# Patient Record
Sex: FEMALE | Race: WHITE | NOT HISPANIC OR LATINO | URBAN - METROPOLITAN AREA
[De-identification: names, ages, dates, MRNs, and addresses within clinical notes are randomized per-mention and may not be internally consistent; named-entity substitution may affect disease eponyms.]

---

## 2017-10-04 ENCOUNTER — INPATIENT HOSPITAL (OUTPATIENT)
Dept: URBAN - METROPOLITAN AREA HOSPITAL 76 | Facility: HOSPITAL | Age: 62
End: 2017-10-04
Payer: MEDICAID

## 2017-10-04 DIAGNOSIS — R11.2 NAUSEA WITH VOMITING, UNSPECIFIED: ICD-10-CM

## 2017-10-04 DIAGNOSIS — R10.32 LEFT LOWER QUADRANT PAIN: ICD-10-CM

## 2017-10-04 DIAGNOSIS — R93.3 ABNORMAL FINDINGS ON DIAGNOSTIC IMAGING OF OTHER PARTS OF DI: ICD-10-CM

## 2017-10-04 DIAGNOSIS — K70.30 ALCOHOLIC CIRRHOSIS OF LIVER WITHOUT ASCITES: ICD-10-CM

## 2017-10-04 DIAGNOSIS — J44.9 CHRONIC OBSTRUCTIVE PULMONARY DISEASE, UNSPECIFIED: ICD-10-CM

## 2017-10-04 DIAGNOSIS — R10.31 RIGHT LOWER QUADRANT PAIN: ICD-10-CM

## 2017-10-04 PROCEDURE — 99223 1ST HOSP IP/OBS HIGH 75: CPT | Performed by: NURSE PRACTITIONER

## 2017-10-05 ENCOUNTER — INPATIENT HOSPITAL (OUTPATIENT)
Dept: URBAN - METROPOLITAN AREA HOSPITAL 76 | Facility: HOSPITAL | Age: 62
End: 2017-10-05
Payer: MEDICAID

## 2017-10-05 DIAGNOSIS — R93.3 ABNORMAL FINDINGS ON DIAGNOSTIC IMAGING OF OTHER PARTS OF DI: ICD-10-CM

## 2017-10-05 DIAGNOSIS — K70.30 ALCOHOLIC CIRRHOSIS OF LIVER WITHOUT ASCITES: ICD-10-CM

## 2017-10-05 DIAGNOSIS — A41.9 SEPSIS, UNSPECIFIED ORGANISM: ICD-10-CM

## 2017-10-05 DIAGNOSIS — D72.825 BANDEMIA: ICD-10-CM

## 2017-10-05 DIAGNOSIS — R10.9 UNSPECIFIED ABDOMINAL PAIN: ICD-10-CM

## 2017-10-05 DIAGNOSIS — J44.9 CHRONIC OBSTRUCTIVE PULMONARY DISEASE, UNSPECIFIED: ICD-10-CM

## 2017-10-05 PROCEDURE — 99232 SBSQ HOSP IP/OBS MODERATE 35: CPT | Performed by: NURSE PRACTITIONER

## 2017-10-06 ENCOUNTER — INPATIENT HOSPITAL (OUTPATIENT)
Dept: URBAN - METROPOLITAN AREA HOSPITAL 76 | Facility: HOSPITAL | Age: 62
End: 2017-10-06
Payer: MEDICAID

## 2017-10-06 DIAGNOSIS — D72.825 BANDEMIA: ICD-10-CM

## 2017-10-06 DIAGNOSIS — J44.9 CHRONIC OBSTRUCTIVE PULMONARY DISEASE, UNSPECIFIED: ICD-10-CM

## 2017-10-06 DIAGNOSIS — A41.9 SEPSIS, UNSPECIFIED ORGANISM: ICD-10-CM

## 2017-10-06 DIAGNOSIS — R93.3 ABNORMAL FINDINGS ON DIAGNOSTIC IMAGING OF OTHER PARTS OF DI: ICD-10-CM

## 2017-10-06 DIAGNOSIS — R10.9 UNSPECIFIED ABDOMINAL PAIN: ICD-10-CM

## 2017-10-06 DIAGNOSIS — K70.30 ALCOHOLIC CIRRHOSIS OF LIVER WITHOUT ASCITES: ICD-10-CM

## 2017-10-06 PROCEDURE — 99232 SBSQ HOSP IP/OBS MODERATE 35: CPT | Performed by: NURSE PRACTITIONER

## 2017-10-30 ENCOUNTER — OFFICE (OUTPATIENT)
Dept: URBAN - METROPOLITAN AREA CLINIC 64 | Facility: CLINIC | Age: 62
End: 2017-10-30
Payer: MEDICAID

## 2017-10-30 VITALS
HEIGHT: 60 IN | SYSTOLIC BLOOD PRESSURE: 127 MMHG | DIASTOLIC BLOOD PRESSURE: 76 MMHG | HEART RATE: 97 BPM | WEIGHT: 95 LBS

## 2017-10-30 DIAGNOSIS — K70.30 ALCOHOLIC CIRRHOSIS OF LIVER WITHOUT ASCITES: ICD-10-CM

## 2017-10-30 DIAGNOSIS — K56.2 VOLVULUS: ICD-10-CM

## 2017-10-30 DIAGNOSIS — K56.601 COMPLETE INTESTINAL OBSTRUCTION, UNSPECIFIED AS TO CAUSE: ICD-10-CM

## 2017-10-30 PROCEDURE — 99213 OFFICE O/P EST LOW 20 MIN: CPT | Performed by: NURSE PRACTITIONER

## 2022-03-13 ENCOUNTER — APPOINTMENT (OUTPATIENT)
Dept: GENERAL RADIOLOGY | Facility: HOSPITAL | Age: 67
End: 2022-03-13

## 2022-03-13 ENCOUNTER — HOSPITAL ENCOUNTER (INPATIENT)
Facility: HOSPITAL | Age: 67
LOS: 5 days | Discharge: HOME | End: 2022-03-18
Attending: INTERNAL MEDICINE | Admitting: HOSPITALIST

## 2022-03-13 ENCOUNTER — APPOINTMENT (OUTPATIENT)
Dept: CARDIOLOGY | Facility: HOSPITAL | Age: 67
End: 2022-03-13

## 2022-03-13 PROBLEM — E87.20 LACTIC ACIDOSIS: Status: ACTIVE | Noted: 2022-03-13

## 2022-03-13 PROBLEM — R65.10 SIRS (SYSTEMIC INFLAMMATORY RESPONSE SYNDROME): Status: ACTIVE | Noted: 2022-03-13

## 2022-03-13 PROBLEM — I50.9 CHF EXACERBATION: Status: ACTIVE | Noted: 2022-03-13

## 2022-03-13 PROBLEM — D69.6 THROMBOCYTOPENIA: Status: ACTIVE | Noted: 2022-03-13

## 2022-03-13 PROBLEM — E87.1 HYPEROSMOLAR HYPONATREMIA: Status: RESOLVED | Noted: 2022-03-13 | Resolved: 2022-03-13

## 2022-03-13 PROBLEM — R74.8 ELEVATED LIVER ENZYMES: Status: ACTIVE | Noted: 2022-03-13

## 2022-03-13 PROBLEM — T14.8XXA SKIN EXCORIATION: Status: ACTIVE | Noted: 2022-03-13

## 2022-03-13 PROBLEM — E87.0 HYPEROSMOLAR HYPONATREMIA: Status: ACTIVE | Noted: 2022-03-13

## 2022-03-13 PROBLEM — E87.1 HYPEROSMOLAR HYPONATREMIA: Status: ACTIVE | Noted: 2022-03-13

## 2022-03-13 PROBLEM — E87.0 HYPEROSMOLAR HYPONATREMIA: Status: RESOLVED | Noted: 2022-03-13 | Resolved: 2022-03-13

## 2022-03-13 LAB
ALBUMIN SERPL-MCNC: 2.1 G/DL (ref 3.5–5.2)
ALBUMIN SERPL-MCNC: 2.1 G/DL (ref 3.5–5.2)
ALBUMIN SERPL-MCNC: 2.4 G/DL (ref 3.5–5.2)
ALBUMIN/GLOB SERPL: 0.8 G/DL
ALP SERPL-CCNC: 137 U/L (ref 39–117)
ALT SERPL W P-5'-P-CCNC: 64 U/L (ref 1–33)
ANION GAP SERPL CALCULATED.3IONS-SCNC: 3 MMOL/L (ref 5–15)
ANION GAP SERPL CALCULATED.3IONS-SCNC: 4 MMOL/L (ref 5–15)
ANION GAP SERPL CALCULATED.3IONS-SCNC: 5 MMOL/L (ref 5–15)
ARTERIAL PATENCY WRIST A: POSITIVE
AST SERPL-CCNC: 58 U/L (ref 1–32)
ATMOSPHERIC PRESS: ABNORMAL MM[HG]
BASE EXCESS BLDA CALC-SCNC: 9.8 MMOL/L (ref 0–3)
BASOPHILS # BLD AUTO: 0 10*3/MM3 (ref 0–0.2)
BASOPHILS NFR BLD AUTO: 0.3 % (ref 0–1.5)
BDY SITE: ABNORMAL
BH CV ECHO MEAS - AO MAX PG: 5.7 MMHG
BH CV ECHO MEAS - AO MEAN PG: 3.1 MMHG
BH CV ECHO MEAS - AO ROOT DIAM: 2.16 CM
BH CV ECHO MEAS - AO V2 MAX: 119.8 CM/SEC
BH CV ECHO MEAS - AO V2 VTI: 22.3 CM
BH CV ECHO MEAS - AVA(I,D): 1.79 CM2
BH CV ECHO MEAS - EDV(CUBED): 24.2 ML
BH CV ECHO MEAS - EDV(MOD-SP4): 29.2 ML
BH CV ECHO MEAS - EF(MOD-BP): 63 %
BH CV ECHO MEAS - EF(MOD-SP4): 62.8 %
BH CV ECHO MEAS - ESV(CUBED): 10.5 ML
BH CV ECHO MEAS - ESV(MOD-SP4): 10.9 ML
BH CV ECHO MEAS - FS: 24.3 %
BH CV ECHO MEAS - IVS/LVPW: 1.13 CM
BH CV ECHO MEAS - IVSD: 0.88 CM
BH CV ECHO MEAS - LA DIMENSION(2D): 2.5 CM
BH CV ECHO MEAS - LV DIASTOLIC VOL/BSA (35-75): 18.6 CM2
BH CV ECHO MEAS - LV MASS(C)D: 58.6 GRAMS
BH CV ECHO MEAS - LV MAX PG: 4.8 MMHG
BH CV ECHO MEAS - LV MEAN PG: 1.97 MMHG
BH CV ECHO MEAS - LV SYSTOLIC VOL/BSA (12-30): 6.9 CM2
BH CV ECHO MEAS - LV V1 MAX: 110.1 CM/SEC
BH CV ECHO MEAS - LV V1 VTI: 18.9 CM
BH CV ECHO MEAS - LVIDD: 2.9 CM
BH CV ECHO MEAS - LVIDS: 2.19 CM
BH CV ECHO MEAS - LVOT AREA: 2.11 CM2
BH CV ECHO MEAS - LVOT DIAM: 1.64 CM
BH CV ECHO MEAS - LVPWD: 0.78 CM
BH CV ECHO MEAS - MV A MAX VEL: 64.8 CM/SEC
BH CV ECHO MEAS - MV DEC SLOPE: 340.5 CM/SEC2
BH CV ECHO MEAS - MV DEC TIME: 0.13 MSEC
BH CV ECHO MEAS - MV E MAX VEL: 45.1 CM/SEC
BH CV ECHO MEAS - MV E/A: 0.7
BH CV ECHO MEAS - MV MAX PG: 3.4 MMHG
BH CV ECHO MEAS - MV MEAN PG: 1.91 MMHG
BH CV ECHO MEAS - MV V2 VTI: 16.7 CM
BH CV ECHO MEAS - MVA(VTI): 2.39 CM2
BH CV ECHO MEAS - PA ACC TIME: 0.11 SEC
BH CV ECHO MEAS - PA PR(ACCEL): 29.2 MMHG
BH CV ECHO MEAS - PA V2 MAX: 118 CM/SEC
BH CV ECHO MEAS - RAP SYSTOLE: 3 MMHG
BH CV ECHO MEAS - RV MAX PG: 6.6 MMHG
BH CV ECHO MEAS - RV V1 MAX: 128.3 CM/SEC
BH CV ECHO MEAS - RV V1 VTI: 21.2 CM
BH CV ECHO MEAS - RVDD: 3.1 CM
BH CV ECHO MEAS - RVOT DIAM: 2.22 CM
BH CV ECHO MEAS - RVSP: 18.3 MMHG
BH CV ECHO MEAS - SI(MOD-SP4): 11.6 ML/M2
BH CV ECHO MEAS - SV(LVOT): 40 ML
BH CV ECHO MEAS - SV(MOD-SP4): 18.3 ML
BH CV ECHO MEAS - SV(RVOT): 82.3 ML
BH CV ECHO MEAS - TR MAX PG: 15.3 MMHG
BH CV ECHO MEAS - TR MAX VEL: 195.7 CM/SEC
BILIRUB SERPL-MCNC: 2.9 MG/DL (ref 0–1.2)
BUN SERPL-MCNC: 10 MG/DL (ref 8–23)
BUN SERPL-MCNC: 10 MG/DL (ref 8–23)
BUN SERPL-MCNC: 11 MG/DL (ref 8–23)
BUN/CREAT SERPL: 16.1 (ref 7–25)
BUN/CREAT SERPL: 16.4 (ref 7–25)
BUN/CREAT SERPL: 18 (ref 7–25)
CALCIUM SPEC-SCNC: 8.6 MG/DL (ref 8.6–10.5)
CALCIUM SPEC-SCNC: 8.6 MG/DL (ref 8.6–10.5)
CALCIUM SPEC-SCNC: 9 MG/DL (ref 8.6–10.5)
CHLORIDE SERPL-SCNC: 77 MMOL/L (ref 98–107)
CHLORIDE SERPL-SCNC: 80 MMOL/L (ref 98–107)
CHLORIDE SERPL-SCNC: 80 MMOL/L (ref 98–107)
CHOLEST SERPL-MCNC: 68 MG/DL (ref 0–200)
CO2 BLDA-SCNC: 38.6 MMOL/L (ref 22–29)
CO2 SERPL-SCNC: 35 MMOL/L (ref 22–29)
CO2 SERPL-SCNC: 35 MMOL/L (ref 22–29)
CO2 SERPL-SCNC: 36 MMOL/L (ref 22–29)
CREAT SERPL-MCNC: 0.61 MG/DL (ref 0.57–1)
CREAT SERPL-MCNC: 0.61 MG/DL (ref 0.57–1)
CREAT SERPL-MCNC: 0.62 MG/DL (ref 0.57–1)
D DIMER PPP FEU-MCNC: 0.53 MG/L (FEU) (ref 0–0.59)
D-LACTATE SERPL-SCNC: 1.9 MMOL/L (ref 0.5–2)
D-LACTATE SERPL-SCNC: 2.1 MMOL/L (ref 0.5–2)
DEPRECATED RDW RBC AUTO: 59.1 FL (ref 37–54)
EGFRCR SERPLBLD CKD-EPI 2021: 99 ML/MIN/1.73
EGFRCR SERPLBLD CKD-EPI 2021: 99.4 ML/MIN/1.73
EGFRCR SERPLBLD CKD-EPI 2021: 99.4 ML/MIN/1.73
EOSINOPHIL # BLD AUTO: 0 10*3/MM3 (ref 0–0.4)
EOSINOPHIL NFR BLD AUTO: 0.2 % (ref 0.3–6.2)
ERYTHROCYTE [DISTWIDTH] IN BLOOD BY AUTOMATED COUNT: 17.6 % (ref 12.3–15.4)
GGT SERPL-CCNC: 89 U/L (ref 5–36)
GLOBULIN UR ELPH-MCNC: 3.1 GM/DL
GLUCOSE SERPL-MCNC: 103 MG/DL (ref 65–99)
GLUCOSE SERPL-MCNC: 115 MG/DL (ref 65–99)
GLUCOSE SERPL-MCNC: 140 MG/DL (ref 65–99)
HCO3 BLDA-SCNC: 36.8 MMOL/L (ref 21–28)
HCT VFR BLD AUTO: 33.1 % (ref 34–46.6)
HDLC SERPL-MCNC: 50 MG/DL (ref 40–60)
HEMODILUTION: NO
HGB BLD-MCNC: 11.1 G/DL (ref 12–15.9)
INHALED O2 CONCENTRATION: 21 %
INR PPP: 1.21 (ref 0.93–1.1)
LDLC SERPL CALC-MCNC: <5 MG/DL (ref 0–100)
LDLC/HDLC SERPL: 0.11 {RATIO}
LYMPHOCYTES # BLD AUTO: 0.6 10*3/MM3 (ref 0.7–3.1)
LYMPHOCYTES NFR BLD AUTO: 5.6 % (ref 19.6–45.3)
MAGNESIUM SERPL-MCNC: 1.6 MG/DL (ref 1.6–2.4)
MAXIMAL PREDICTED HEART RATE: 155 BPM
MCH RBC QN AUTO: 32.4 PG (ref 26.6–33)
MCHC RBC AUTO-ENTMCNC: 33.5 G/DL (ref 31.5–35.7)
MCV RBC AUTO: 96.8 FL (ref 79–97)
MODALITY: ABNORMAL
MONOCYTES # BLD AUTO: 0.4 10*3/MM3 (ref 0.1–0.9)
MONOCYTES NFR BLD AUTO: 3.8 % (ref 5–12)
NEUTROPHILS NFR BLD AUTO: 9.2 10*3/MM3 (ref 1.7–7)
NEUTROPHILS NFR BLD AUTO: 90.1 % (ref 42.7–76)
NRBC BLD AUTO-RTO: 0.1 /100 WBC (ref 0–0.2)
NT-PROBNP SERPL-MCNC: 7785 PG/ML (ref 0–900)
OSMOLALITY SERPL: 250 MOSM/KG (ref 280–301)
OSMOLALITY UR: 417 MOSM/KG (ref 300–800)
PCO2 BLDA: 60.5 MM HG (ref 35–48)
PH BLDA: 7.39 PH UNITS (ref 7.35–7.45)
PHOSPHATE SERPL-MCNC: 3.2 MG/DL (ref 2.5–4.5)
PHOSPHATE SERPL-MCNC: 3.6 MG/DL (ref 2.5–4.5)
PHOSPHATE SERPL-MCNC: 3.7 MG/DL (ref 2.5–4.5)
PLATELET # BLD AUTO: 114 10*3/MM3 (ref 140–450)
PMV BLD AUTO: 8 FL (ref 6–12)
PO2 BLDA: 51.5 MM HG (ref 83–108)
POTASSIUM SERPL-SCNC: 4.8 MMOL/L (ref 3.5–5.2)
POTASSIUM SERPL-SCNC: 5.1 MMOL/L (ref 3.5–5.2)
POTASSIUM SERPL-SCNC: 5.1 MMOL/L (ref 3.5–5.2)
POTASSIUM SERPL-SCNC: 6.1 MMOL/L (ref 3.5–5.2)
PROT SERPL-MCNC: 5.5 G/DL (ref 6–8.5)
PROTHROMBIN TIME: 13.2 SECONDS (ref 9.6–11.7)
RBC # BLD AUTO: 3.42 10*6/MM3 (ref 3.77–5.28)
SAO2 % BLDCOA: 84.4 % (ref 94–98)
SODIUM SERPL-SCNC: 116 MMOL/L (ref 136–145)
SODIUM SERPL-SCNC: 119 MMOL/L (ref 136–145)
SODIUM SERPL-SCNC: 120 MMOL/L (ref 136–145)
SODIUM UR-SCNC: <20 MMOL/L
STRESS TARGET HR: 132 BPM
TRIGL SERPL-MCNC: 63 MG/DL (ref 0–150)
TROPONIN T SERPL-MCNC: <0.01 NG/ML (ref 0–0.03)
TSH SERPL DL<=0.05 MIU/L-ACNC: 3.2 UIU/ML (ref 0.27–4.2)
VLDLC SERPL-MCNC: NORMAL MG/DL
WBC NRBC COR # BLD: 10.2 10*3/MM3 (ref 3.4–10.8)

## 2022-03-13 PROCEDURE — 94799 UNLISTED PULMONARY SVC/PX: CPT

## 2022-03-13 PROCEDURE — 99222 1ST HOSP IP/OBS MODERATE 55: CPT | Performed by: INTERNAL MEDICINE

## 2022-03-13 PROCEDURE — 83930 ASSAY OF BLOOD OSMOLALITY: CPT | Performed by: INTERNAL MEDICINE

## 2022-03-13 PROCEDURE — 83880 ASSAY OF NATRIURETIC PEPTIDE: CPT | Performed by: INTERNAL MEDICINE

## 2022-03-13 PROCEDURE — 71045 X-RAY EXAM CHEST 1 VIEW: CPT

## 2022-03-13 PROCEDURE — 25010000002 FUROSEMIDE PER 20 MG: Performed by: INTERNAL MEDICINE

## 2022-03-13 PROCEDURE — 80061 LIPID PANEL: CPT | Performed by: INTERNAL MEDICINE

## 2022-03-13 PROCEDURE — 99223 1ST HOSP IP/OBS HIGH 75: CPT | Performed by: INTERNAL MEDICINE

## 2022-03-13 PROCEDURE — 83735 ASSAY OF MAGNESIUM: CPT | Performed by: INTERNAL MEDICINE

## 2022-03-13 PROCEDURE — 85025 COMPLETE CBC W/AUTO DIFF WBC: CPT | Performed by: INTERNAL MEDICINE

## 2022-03-13 PROCEDURE — 25010000002 ENOXAPARIN PER 10 MG: Performed by: INTERNAL MEDICINE

## 2022-03-13 PROCEDURE — 25010000002 CEFTRIAXONE PER 250 MG: Performed by: INTERNAL MEDICINE

## 2022-03-13 PROCEDURE — 87040 BLOOD CULTURE FOR BACTERIA: CPT | Performed by: INTERNAL MEDICINE

## 2022-03-13 PROCEDURE — 93306 TTE W/DOPPLER COMPLETE: CPT | Performed by: INTERNAL MEDICINE

## 2022-03-13 PROCEDURE — 25010000002 CALCIUM GLUCONATE-NACL 1-0.675 GM/50ML-% SOLUTION: Performed by: HOSPITALIST

## 2022-03-13 PROCEDURE — 36600 WITHDRAWAL OF ARTERIAL BLOOD: CPT

## 2022-03-13 PROCEDURE — 82803 BLOOD GASES ANY COMBINATION: CPT

## 2022-03-13 PROCEDURE — 84300 ASSAY OF URINE SODIUM: CPT | Performed by: INTERNAL MEDICINE

## 2022-03-13 PROCEDURE — 97163 PT EVAL HIGH COMPLEX 45 MIN: CPT

## 2022-03-13 PROCEDURE — 84100 ASSAY OF PHOSPHORUS: CPT | Performed by: INTERNAL MEDICINE

## 2022-03-13 PROCEDURE — 85379 FIBRIN DEGRADATION QUANT: CPT | Performed by: INTERNAL MEDICINE

## 2022-03-13 PROCEDURE — 84443 ASSAY THYROID STIM HORMONE: CPT | Performed by: INTERNAL MEDICINE

## 2022-03-13 PROCEDURE — 84484 ASSAY OF TROPONIN QUANT: CPT | Performed by: INTERNAL MEDICINE

## 2022-03-13 PROCEDURE — 83935 ASSAY OF URINE OSMOLALITY: CPT | Performed by: INTERNAL MEDICINE

## 2022-03-13 PROCEDURE — 83605 ASSAY OF LACTIC ACID: CPT | Performed by: INTERNAL MEDICINE

## 2022-03-13 PROCEDURE — 93306 TTE W/DOPPLER COMPLETE: CPT

## 2022-03-13 PROCEDURE — 82977 ASSAY OF GGT: CPT | Performed by: INTERNAL MEDICINE

## 2022-03-13 PROCEDURE — 80053 COMPREHEN METABOLIC PANEL: CPT | Performed by: INTERNAL MEDICINE

## 2022-03-13 PROCEDURE — 85610 PROTHROMBIN TIME: CPT | Performed by: INTERNAL MEDICINE

## 2022-03-13 PROCEDURE — 94640 AIRWAY INHALATION TREATMENT: CPT

## 2022-03-13 PROCEDURE — 25010000002 LORAZEPAM PER 2 MG: Performed by: INTERNAL MEDICINE

## 2022-03-13 RX ORDER — IPRATROPIUM BROMIDE AND ALBUTEROL SULFATE 2.5; .5 MG/3ML; MG/3ML
3 SOLUTION RESPIRATORY (INHALATION)
Status: DISCONTINUED | OUTPATIENT
Start: 2022-03-13 | End: 2022-03-18 | Stop reason: HOSPADM

## 2022-03-13 RX ORDER — POTASSIUM CHLORIDE 750 MG/1
TABLET, EXTENDED RELEASE ORAL
COMMUNITY

## 2022-03-13 RX ORDER — TRAZODONE HYDROCHLORIDE 50 MG/1
25 TABLET ORAL NIGHTLY
COMMUNITY

## 2022-03-13 RX ORDER — MIDODRINE HYDROCHLORIDE 5 MG/1
5 TABLET ORAL
Status: DISCONTINUED | OUTPATIENT
Start: 2022-03-13 | End: 2022-03-14

## 2022-03-13 RX ORDER — LORAZEPAM 2 MG/ML
1 INJECTION INTRAMUSCULAR
Status: DISCONTINUED | OUTPATIENT
Start: 2022-03-13 | End: 2022-03-15

## 2022-03-13 RX ORDER — SODIUM CHLORIDE 0.9 % (FLUSH) 0.9 %
10 SYRINGE (ML) INJECTION AS NEEDED
Status: DISCONTINUED | OUTPATIENT
Start: 2022-03-13 | End: 2022-03-18 | Stop reason: HOSPADM

## 2022-03-13 RX ORDER — NYSTATIN 100000 [USP'U]/G
POWDER TOPICAL EVERY 12 HOURS SCHEDULED
Status: DISCONTINUED | OUTPATIENT
Start: 2022-03-13 | End: 2022-03-18 | Stop reason: HOSPADM

## 2022-03-13 RX ORDER — FUROSEMIDE 10 MG/ML
40 INJECTION INTRAMUSCULAR; INTRAVENOUS DAILY
Status: DISCONTINUED | OUTPATIENT
Start: 2022-03-13 | End: 2022-03-13

## 2022-03-13 RX ORDER — LORAZEPAM 2 MG/ML
0.5 INJECTION INTRAMUSCULAR EVERY 6 HOURS
Status: DISPENSED | OUTPATIENT
Start: 2022-03-13 | End: 2022-03-14

## 2022-03-13 RX ORDER — PANTOPRAZOLE SODIUM 40 MG/1
40 TABLET, DELAYED RELEASE ORAL
Status: DISCONTINUED | OUTPATIENT
Start: 2022-03-13 | End: 2022-03-18 | Stop reason: HOSPADM

## 2022-03-13 RX ORDER — MELATONIN
1000 DAILY
COMMUNITY

## 2022-03-13 RX ORDER — ALBUTEROL SULFATE 2.5 MG/3ML
2.5 SOLUTION RESPIRATORY (INHALATION) EVERY 6 HOURS PRN
Status: DISCONTINUED | OUTPATIENT
Start: 2022-03-13 | End: 2022-03-18 | Stop reason: HOSPADM

## 2022-03-13 RX ORDER — CALCIUM GLUCONATE 20 MG/ML
1 INJECTION, SOLUTION INTRAVENOUS ONCE
Status: COMPLETED | OUTPATIENT
Start: 2022-03-13 | End: 2022-03-13

## 2022-03-13 RX ORDER — LISINOPRIL 5 MG/1
5 TABLET ORAL DAILY
Status: DISCONTINUED | OUTPATIENT
Start: 2022-03-13 | End: 2022-03-13

## 2022-03-13 RX ORDER — LORAZEPAM 2 MG/ML
0.5 INJECTION INTRAMUSCULAR
Status: DISCONTINUED | OUTPATIENT
Start: 2022-03-13 | End: 2022-03-15

## 2022-03-13 RX ORDER — LORAZEPAM 1 MG/1
1 TABLET ORAL
Status: DISCONTINUED | OUTPATIENT
Start: 2022-03-13 | End: 2022-03-15

## 2022-03-13 RX ORDER — SODIUM CHLORIDE 0.9 % (FLUSH) 0.9 %
10 SYRINGE (ML) INJECTION EVERY 12 HOURS SCHEDULED
Status: DISCONTINUED | OUTPATIENT
Start: 2022-03-13 | End: 2022-03-18 | Stop reason: HOSPADM

## 2022-03-13 RX ORDER — GUAIFENESIN 600 MG/1
600 TABLET, EXTENDED RELEASE ORAL 2 TIMES DAILY
COMMUNITY

## 2022-03-13 RX ORDER — CARVEDILOL 3.12 MG/1
3.12 TABLET ORAL 2 TIMES DAILY WITH MEALS
Status: DISCONTINUED | OUTPATIENT
Start: 2022-03-13 | End: 2022-03-18 | Stop reason: HOSPADM

## 2022-03-13 RX ORDER — LORAZEPAM 0.5 MG/1
0.5 TABLET ORAL
Status: DISCONTINUED | OUTPATIENT
Start: 2022-03-13 | End: 2022-03-15

## 2022-03-13 RX ORDER — TRAZODONE HYDROCHLORIDE 50 MG/1
25 TABLET ORAL NIGHTLY
Status: DISCONTINUED | OUTPATIENT
Start: 2022-03-13 | End: 2022-03-15

## 2022-03-13 RX ORDER — LORAZEPAM 2 MG/ML
0.5 INJECTION INTRAMUSCULAR EVERY 8 HOURS
Status: DISCONTINUED | OUTPATIENT
Start: 2022-03-14 | End: 2022-03-15

## 2022-03-13 RX ORDER — FUROSEMIDE 40 MG/1
40 TABLET ORAL DAILY
COMMUNITY

## 2022-03-13 RX ORDER — SODIUM CHLORIDE 9 MG/ML
75 INJECTION, SOLUTION INTRAVENOUS CONTINUOUS
Status: DISPENSED | OUTPATIENT
Start: 2022-03-13 | End: 2022-03-14

## 2022-03-13 RX ORDER — CEPHALEXIN 500 MG/1
500 CAPSULE ORAL 2 TIMES DAILY
COMMUNITY
Start: 2022-03-09 | End: 2022-03-18 | Stop reason: HOSPADM

## 2022-03-13 RX ADMIN — CARVEDILOL 3.12 MG: 3.12 TABLET, FILM COATED ORAL at 08:14

## 2022-03-13 RX ADMIN — LORAZEPAM 0.5 MG: 2 INJECTION INTRAMUSCULAR; INTRAVENOUS at 05:15

## 2022-03-13 RX ADMIN — Medication 10 ML: at 09:00

## 2022-03-13 RX ADMIN — MIDODRINE HYDROCHLORIDE 5 MG: 5 TABLET ORAL at 17:18

## 2022-03-13 RX ADMIN — CEFTRIAXONE 1 G: 1 INJECTION, POWDER, FOR SOLUTION INTRAMUSCULAR; INTRAVENOUS at 07:19

## 2022-03-13 RX ADMIN — IPRATROPIUM BROMIDE AND ALBUTEROL SULFATE 3 ML: .5; 3 SOLUTION RESPIRATORY (INHALATION) at 11:20

## 2022-03-13 RX ADMIN — FUROSEMIDE 40 MG: 10 INJECTION, SOLUTION INTRAMUSCULAR; INTRAVENOUS at 08:14

## 2022-03-13 RX ADMIN — IPRATROPIUM BROMIDE AND ALBUTEROL SULFATE 3 ML: .5; 3 SOLUTION RESPIRATORY (INHALATION) at 07:12

## 2022-03-13 RX ADMIN — NYSTATIN: 100000 POWDER TOPICAL at 07:18

## 2022-03-13 RX ADMIN — PANTOPRAZOLE SODIUM 40 MG: 40 TABLET, DELAYED RELEASE ORAL at 05:15

## 2022-03-13 RX ADMIN — CALCIUM GLUCONATE 1 G: 20 INJECTION, SOLUTION INTRAVENOUS at 14:41

## 2022-03-13 RX ADMIN — SODIUM ZIRCONIUM CYCLOSILICATE 5 G: 5 POWDER, FOR SUSPENSION ORAL at 08:14

## 2022-03-13 RX ADMIN — TRAZODONE HYDROCHLORIDE 25 MG: 50 TABLET ORAL at 20:46

## 2022-03-13 RX ADMIN — Medication 10 ML: at 20:48

## 2022-03-13 RX ADMIN — ENOXAPARIN SODIUM 40 MG: 40 INJECTION SUBCUTANEOUS at 16:15

## 2022-03-13 RX ADMIN — SODIUM CHLORIDE 75 ML/HR: 9 INJECTION, SOLUTION INTRAVENOUS at 17:18

## 2022-03-13 RX ADMIN — NYSTATIN: 100000 POWDER TOPICAL at 16:15

## 2022-03-13 RX ADMIN — IPRATROPIUM BROMIDE AND ALBUTEROL SULFATE 3 ML: .5; 3 SOLUTION RESPIRATORY (INHALATION) at 20:22

## 2022-03-13 RX ADMIN — IPRATROPIUM BROMIDE AND ALBUTEROL SULFATE 3 ML: .5; 3 SOLUTION RESPIRATORY (INHALATION) at 15:19

## 2022-03-13 NOTE — PROGRESS NOTES
[facility] Medicine Services Daily Progress Note    Patient Name: Shanelle Solis  : 1955  MRN: [id number]  Primary Care Physician:  Provider, No Known  Date of admission: 3/13/2022      Subjective      Chief Complaint: None      Patient Reports:    3/13/22: Patient poor historian.  Given Ativan for CIWA.  Nephrology and cardiology consulted.    Review of Systems   All other systems reviewed and are negative.         Objective      Vitals:   Temp:  [96 °F (35.6 °C)-98.1 °F (36.7 °C)] 97.9 °F (36.6 °C)  Heart Rate:  [] 79  Resp:  [16-19] 18  BP: ()/(46-95) 93/56  Flow (L/min):  [3] 3    Physical Exam  HENT:      Head: Normocephalic.      Mouth/Throat:      Mouth: Mucous membranes are moist.   Eyes:      General: No scleral icterus.     Extraocular Movements: Extraocular movements intact.      Pupils: Pupils are equal, round, and reactive to light.   Cardiovascular:      Rate and Rhythm: Normal rate.   Pulmonary:      Effort: Pulmonary effort is normal.   Abdominal:      General: Bowel sounds are normal.   Musculoskeletal:      Cervical back: Normal range of motion.      Comments: Erythema of thighs, posterior thigh and lower abdomen   Neurological:      Mental Status: She is alert.             Result Review    Result Review:  I have personally reviewed the results from the time of this admission to 3/13/2022 16:09 EDT and agree with these findings:  [x]  Laboratory  []  Microbiology  [x]  Radiology  []  EKG/Telemetry   []  Cardiology/Vascular   []  Pathology  [x]  Old records  []  Other:      Wounds (last 24 hours)     LDA Wound     Row Name 22 1157 22 0800 22 0528       Wound 22 0506 Left gluteal Pressure Injury    Wound - Properties Group Placement Date: 22  -ER Placement Time: 050  -ER Side: Left  -ER Location: gluteal  -ER Primary Wound Type: Pressure inj  -ER    Dressing Care foam  -ER foam  -ER --    Retired Wound - Properties Group  Placement Date: 03/13/22  -ER Placement Time: 0506  -ER Side: Left  -ER Location: gluteal  -ER Primary Wound Type: Pressure inj  -ER    Retired Wound - Properties Group Date first assessed: 03/13/22  -ER Time first assessed: 0506  -ER Side: Left  -ER Location: gluteal  -ER Primary Wound Type: Pressure inj  -ER       Wound 03/13/22 0507 Right gluteal Pressure Injury    Wound - Properties Group Placement Date: 03/13/22  -ER Placement Time: 0507  -ER Side: Right  -ER Location: gluteal  -ER Primary Wound Type: Pressure inj  -ER    Dressing Care foam  -ER foam  -ER --    Retired Wound - Properties Group Placement Date: 03/13/22  -ER Placement Time: 0507  -ER Side: Right  -ER Location: gluteal  -ER Primary Wound Type: Pressure inj  -ER    Retired Wound - Properties Group Date first assessed: 03/13/22  -ER Time first assessed: 0507  -ER Side: Right  -ER Location: gluteal  -ER Primary Wound Type: Pressure inj  -ER       Wound 03/13/22 0524 Right proximal arm    Wound - Properties Group Placement Date: 03/13/22  -ER Placement Time: 0524  -ER Side: Right  -ER Orientation: proximal  -ER Location: arm  -ER    Care, Wound other (see comments)  nystatin powder  -ER -- --    Dressing Care -- foam  -ER --    Retired Wound - Properties Group Placement Date: 03/13/22  -ER Placement Time: 0524  -ER Side: Right  -ER Orientation: proximal  -ER Location: arm  -ER    Retired Wound - Properties Group Date first assessed: 03/13/22  -ER Time first assessed: 0524  -ER Side: Right  -ER Location: arm  -ER       Wound 03/13/22 0528 Left lower leg Abrasion    Wound - Properties Group Placement Date: 03/13/22  -ER Placement Time: 0528  -ER Side: Left  -ER Orientation: lower  -ER Location: leg  -ER Primary Wound Type: Abrasion  -ER    Wound Image View All Images -- -- View Images  -ER    Base scab  -ER scab  -ER --    Dressing Care open to air  -ER open to air  -ER --    Retired Wound - Properties Group Placement Date: 03/13/22  -ER Placement Time:  0528  -ER Side: Left  -ER Orientation: lower  -ER Location: leg  -ER Primary Wound Type: Abrasion  -ER    Retired Wound - Properties Group Date first assessed: 03/13/22  -ER Time first assessed: 0528  -ER Side: Left  -ER Location: leg  -ER Primary Wound Type: Abrasion  -ER       Rash 03/13/22 0510 groin    Rash - Properties Group Placement Date: 03/13/22  -ER Placement Time: 0510  -ER Location: groin  -ER    Characteristics pain/discomfort  -ER pain/discomfort  -ER --    Retired Rash - Properties Group Placement Date: 03/13/22  -ER Placement Time: 0510  -ER Location: groin  -ER    Retired Rash - Properties Group Date first assessed: 03/13/22  -ER Time first assessed: 0510  -ER Location: groin  -ER    Row Name 03/13/22 0525 03/13/22 0510 03/13/22 0508       Wound 03/13/22 0506 Left gluteal Pressure Injury    Wound - Properties Group Placement Date: 03/13/22  -ER Placement Time: 0506  -ER Side: Left  -ER Location: gluteal  -ER Primary Wound Type: Pressure inj  -ER    Wound Image View All Images -- -- View Images  -ER    Retired Wound - Properties Group Placement Date: 03/13/22  -ER Placement Time: 0506  -ER Side: Left  -ER Location: gluteal  -ER Primary Wound Type: Pressure inj  -ER    Retired Wound - Properties Group Date first assessed: 03/13/22  -ER Time first assessed: 0506  -ER Side: Left  -ER Location: gluteal  -ER Primary Wound Type: Pressure inj  -ER       Wound 03/13/22 0507 Right gluteal Pressure Injury    Wound - Properties Group Placement Date: 03/13/22  -ER Placement Time: 0507  -ER Side: Right  -ER Location: gluteal  -ER Primary Wound Type: Pressure inj  -ER    Retired Wound - Properties Group Placement Date: 03/13/22  -ER Placement Time: 0507  -ER Side: Right  -ER Location: gluteal  -ER Primary Wound Type: Pressure inj  -ER    Retired Wound - Properties Group Date first assessed: 03/13/22  -ER Time first assessed: 0507  -ER Side: Right  -ER Location: gluteal  -ER Primary Wound Type: Pressure inj  -ER        Wound 03/13/22 0524 Right proximal arm    Wound - Properties Group Placement Date: 03/13/22  -ER Placement Time: 0524  -ER Side: Right  -ER Orientation: proximal  -ER Location: arm  -ER    Wound Image View All Images View Images  -ER -- --    Retired Wound - Properties Group Placement Date: 03/13/22  -ER Placement Time: 0524  -ER Side: Right  -ER Orientation: proximal  -ER Location: arm  -ER    Retired Wound - Properties Group Date first assessed: 03/13/22  -ER Time first assessed: 0524  -ER Side: Right  -ER Location: arm  -ER       Wound 03/13/22 0528 Left lower leg Abrasion    Wound - Properties Group Placement Date: 03/13/22  -ER Placement Time: 0528  -ER Side: Left  -ER Orientation: lower  -ER Location: leg  -ER Primary Wound Type: Abrasion  -ER    Retired Wound - Properties Group Placement Date: 03/13/22  -ER Placement Time: 0528  -ER Side: Left  -ER Orientation: lower  -ER Location: leg  -ER Primary Wound Type: Abrasion  -ER    Retired Wound - Properties Group Date first assessed: 03/13/22  -ER Time first assessed: 0528  -ER Side: Left  -ER Location: leg  -ER Primary Wound Type: Abrasion  -ER       Rash 03/13/22 0510 groin    Rash - Properties Group Placement Date: 03/13/22  -ER Placement Time: 0510  -ER Location: groin  -ER    Wound Image View All Images -- View Images  -ER --    Retired Rash - Properties Group Placement Date: 03/13/22  -ER Placement Time: 0510  -ER Location: groin  -ER    Retired Rash - Properties Group Date first assessed: 03/13/22  -ER Time first assessed: 0510  -ER Location: groin  -ER    Row Name 03/13/22 0507 03/13/22 0506 03/13/22 0438       Wound 03/13/22 0506 Left gluteal Pressure Injury    Wound - Properties Group Placement Date: 03/13/22  -ER Placement Time: 0506  -ER Side: Left  -ER Location: gluteal  -ER Primary Wound Type: Pressure inj  -ER    Wound Image View All Images -- View Images  -ER --    Pressure Injury Stage -- -- 3  -ER    Base -- -- yellow;white  -ER    Periwound  -- -- redness;non-blanchable  -ER    Periwound Temperature -- -- warm  -ER    Periwound Skin Turgor -- -- soft  -ER    Dressing Care -- -- dressing changed;foam  -ER    Retired Wound - Properties Group Placement Date: 03/13/22  -ER Placement Time: 0506  -ER Side: Left  -ER Location: gluteal  -ER Primary Wound Type: Pressure inj  -ER    Retired Wound - Properties Group Date first assessed: 03/13/22  -ER Time first assessed: 0506  -ER Side: Left  -ER Location: gluteal  -ER Primary Wound Type: Pressure inj  -ER       Wound 03/13/22 0507 Right gluteal Pressure Injury    Wound - Properties Group Placement Date: 03/13/22  -ER Placement Time: 0507  -ER Side: Right  -ER Location: gluteal  -ER Primary Wound Type: Pressure inj  -ER    Wound Image View All Images View Images  -ER -- --    Pressure Injury Stage -- -- 3  -ER    Base -- -- yellow;white  -ER    Periwound -- -- redness;non-blanchable  -ER    Periwound Temperature -- -- warm  -ER    Periwound Skin Turgor -- -- soft  -ER    Dressing Care -- -- dressing changed;foam  -ER    Retired Wound - Properties Group Placement Date: 03/13/22  -ER Placement Time: 0507  -ER Side: Right  -ER Location: gluteal  -ER Primary Wound Type: Pressure inj  -ER    Retired Wound - Properties Group Date first assessed: 03/13/22  -ER Time first assessed: 0507  -ER Side: Right  -ER Location: gluteal  -ER Primary Wound Type: Pressure inj  -ER       Wound 03/13/22 0524 Right proximal arm    Wound - Properties Group Placement Date: 03/13/22  -ER Placement Time: 0524  -ER Side: Right  -ER Orientation: proximal  -ER Location: arm  -ER    Base -- -- red  -ER    Periwound Temperature -- -- warm  -ER    Periwound Skin Turgor -- -- soft  -ER    Care, Wound -- -- cleansed with;water  -ER    Retired Wound - Properties Group Placement Date: 03/13/22  -ER Placement Time: 0524  -ER Side: Right  -ER Orientation: proximal  -ER Location: arm  -ER    Retired Wound - Properties Group Date first assessed: 03/13/22   -ER Time first assessed: 0524  -ER Side: Right  -ER Location: arm  -ER       Wound 03/13/22 0528 Left lower leg Abrasion    Wound - Properties Group Placement Date: 03/13/22  -ER Placement Time: 0528  -ER Side: Left  -ER Orientation: lower  -ER Location: leg  -ER Primary Wound Type: Abrasion  -ER    Base -- -- scab  -ER    Dressing Care -- -- open to air  -ER    Retired Wound - Properties Group Placement Date: 03/13/22  -ER Placement Time: 0528  -ER Side: Left  -ER Orientation: lower  -ER Location: leg  -ER Primary Wound Type: Abrasion  -ER    Retired Wound - Properties Group Date first assessed: 03/13/22  -ER Time first assessed: 0528  -ER Side: Left  -ER Location: leg  -ER Primary Wound Type: Abrasion  -ER       Rash 03/13/22 0510 groin    Rash - Properties Group Placement Date: 03/13/22  -ER Placement Time: 0510  -ER Location: groin  -ER    Color -- -- red;pink  -ER    Characteristics -- -- pain/discomfort  -ER    Care, Rash -- -- other (see comments)  nystatin powder  -ER    Retired Rash - Properties Group Placement Date: 03/13/22  -ER Placement Time: 0510  -ER Location: groin  -ER    Retired Rash - Properties Group Date first assessed: 03/13/22  -ER Time first assessed: 0510  -ER Location: groin  -ER          User Key  (r) = Recorded By, (t) = Taken By, (c) = Cosigned By    Initials Name Provider Type    Abbi Hooks RN Registered Nurse                  Assessment/Plan      Brief Patient Summary:    67-year-old female with history of alcoholic cirrhosis, breast/cervical cancer and chronic hypoxemic respiratory failure on 3L oxygen presented to [facility] ED for evaluation of weakness.  Patient was found to have low blood pressure, tachycardia, confusion and was recently started by PCP on Keflex for cellulitis.  Patient found with extensive skin excoriation of thighs, lower abdomen and coccygeal region.  Patient admitted for acute CHF, sepsis due to lower extremity cellulitis and  hyponatremia.      carvedilol, 3.125 mg, Oral, BID With Meals  cefTRIAXone, 1 g, Intravenous, Q24H  enoxaparin, 40 mg, Subcutaneous, Q24H  furosemide, 40 mg, Intravenous, Daily  ipratropium-albuterol, 3 mL, Nebulization, 4x Daily - RT  LORazepam, 0.5 mg, Intravenous, Q6H   Followed by  [START ON 3/14/2022] LORazepam, 0.5 mg, Intravenous, Q8H  nystatin, , Topical, Q12H  pantoprazole, 40 mg, Oral, Q AM  sodium chloride, 10 mL, Intravenous, Q12H  traZODone, 25 mg, Oral, Nightly             Active Hospital Problems:  Active Hospital Problems    Diagnosis    • **CHF exacerbation (HCC)    • Hyponatremia with excess extracellular fluid volume    • Elevated liver enzymes    • Thrombocytopenia (HCC)    • Lactic acidosis    • SIRS (systemic inflammatory response syndrome) (HCC)    • Skin excoriation      Acute diastolic heart failure:  -s/p TTE 3/13/22--> LVEF 61-65%.  -Cardiology consult    Hypervolemic hyponatremia:  -Nephrology consulted    Hyperkalemia:  -s/p calcium gluconate, Lokelma    Sepsis due to lower extremity cellulitis:  -Continue Rocephin    Alcoholic cirrhosis complicated with elevated LFTs, jaundice and thrombocytopenia:  -Check ammonia in the a.m.  -Unknown if she follows with GI    Alcohol abuse:  -On CIWA protocol    Moderate protein calorie malnutrition:  -Encouraged oral intake    COPD/asthma:  -Not in exacerbation    Chronic hypoxemic respiratory failure:  -On 3L at home    GERD:  -Continue PPI    Tobacco dependence:  -We will offer nicotine patch      DVT prophylaxis:  Medical DVT prophylaxis orders are present.    CODE STATUS:    Medical Intervention Limits: NO intubation (DNI); NO cardioversion; NO antiarrhythmic drugs  Level Of Support Discussed With: Patient  Code Status (Patient has no pulse and is not breathing): No CPR (Do Not Attempt to Resuscitate)  Medical Interventions (Patient has pulse or is breathing): Limited Support      Disposition:  I expect patient to be discharged defer.    This  patient has been examined wearing appropriate Personal Protective Equipment and discussed with nursing. 03/13/22      Electronically signed by Eloy Duke DO, 03/13/22, 16:09 EDT.  [facility]

## 2022-03-13 NOTE — CONSULTS
Cardiology Consult Note      REQUESTING PHYSICIAN    Eloy Duke,*    PATIENT IDENTIFICATION  Name: Shanelle Solis  Age: 67 y.o.  Sex: female  :  1955  MRN: [id number]             REASON FOR CONSULTATION:  67-year-old female with no known history of ischemic heart disease.  Past medical history includes EtOH abuse, cirrhosis, asthma/COPD, GERD, breast cancer treated with radiation, cervical cancer, tobacco dependence.      CC:  Low blood pressure  Tachycardia  Confusion  Shortness of breath  Lower extremity edema    HISTORY OF PRESENT ILLNESS:   Patient was brought to the emergency department [facility] 3/13/2022 in transfer from [facility] via EMS after she was found by caregiver with low blood pressure, elevated heart rate, confusion, shortness of breath, lower extremity edema and weakness.  Patient was unable to get out of her chair by herself.  Symptoms worsening over the past few months.  Recently treated for lower extremity cellulitis with oral Keflex.  Patient denies any actual chest pain, pressure, tightness, cough, chills or fever.  In the ED, work-up included sodium of 114, BNP 1514, alk phos 136, mildly elevated LFTs.  White count 17.8, platelets 120.  Chronic lung changes on chest x-ray.    Upon my evaluation, patient sitting in bedside chair and denies any complaints.  Rhythm sinus at 94.  Lower extremity edema has improved significantly.  She denies any nausea or vomiting      REVIEW OF SYSTEMS:  Pertinent items are noted in HPI, all other systems reviewed and negative    OBJECTIVE   Sodium 116  Potassium 6.1  ALT 64/AST 58  Total bilirubin 2.9  proBNP 7785    Blood cultures pending    ASSESSMENT  Heart failure with preserved ejection fraction  Hyponatremia  Hyper kalemia  Altered mental status-resolved  Hypotension/tachycardia-improved  Lower extremity cellulitis  Sepsis with lactic acidosis  EtOH abuse with cirrhosis  COPD  GERD  History of breast  "cancer  Tobacco dependence disorder  Generalized weakness        PLAN  TTE with EF 61-65%, RA mod dilated, technically difficult study, valvular structures not well visualized.  No evidence of pericardial effusion.  Nephrology has been consulted.  CHF protocol initiated with fluid restriction, cardiac diet, IV Lasix 40 mg daily.  Receiving carvedilol 3.125 mg every 12  Patient receiving empiric antibiotic coverage for cellulitis  CIWA protocol initiated  Monitor renal function and electrolytes          Vital Signs  Visit Vitals  /58 (BP Location: Right arm, Patient Position: Lying)   Pulse 92   Temp 96 °F (35.6 °C) (Axillary)   Resp 16   Ht 165.1 cm (65\")   Wt 53.1 kg (117 lb)   SpO2 100%   BMI 19.47 kg/m²     Oxygen Therapy  SpO2: 100 %  Pulse Oximetry Type: Continuous  Device (Oxygen Therapy): nasal cannula  Flow (L/min): 3  Flowsheet Rows      Flowsheet Row First Filed Value   Admission Height 165.1 cm (65\") Documented at 03/13/2022 0142   Admission Weight 53.2 kg (117 lb 4.6 oz) Documented at 03/13/2022 0142          Intake & Output (last 3 days)         03/10 0701  03/11 0700 03/11 0701  03/12 0700 03/12 0701  03/13 0700 03/13 0701  03/14 0700    P.O.    100    Total Intake(mL/kg)    100 (1.9)    Net    +100                  Lines, Drains & Airways       Active LDAs       Name Placement date Placement time Site Days    Peripheral IV 03/13/22 0400 Anterior;Right Forearm 03/13/22  0400  placed from other hospital  Forearm  less than 1    External Urinary Catheter 03/13/22  1157  --  less than 1                    MEDICAL HISTORY    Past Medical History:   Diagnosis Date    Alcohol abuse     Alcoholic cirrhosis (HCC)     Asthma     Cataract     Cellulitis of leg     Chronic gastritis     COPD (chronic obstructive pulmonary disease) (HCC)     GERD (gastroesophageal reflux disease)     History of cervical cancer     Treated with conization    History of right breast cancer     Treated with chemotherapy    " "    SURGICAL HISTORY    Past Surgical History:   Procedure Laterality Date    COLONOSCOPY      FOOT SURGERY          FAMILY HISTORY    Family History   Problem Relation Age of Onset    Suicide Attempts Mother     Pneumonia Father     Cancer Maternal Grandmother        SOCIAL HISTORY    Social History     Tobacco Use    Smoking status: Current Every Day Smoker     Packs/day: 0.25     Types: Cigarettes     Start date: 3/13/2022    Smokeless tobacco: Current User   Substance Use Topics    Alcohol use: Yes     Comment: Daily alcohol abuse        ALLERGIES    No Known Allergies           /58 (BP Location: Right arm, Patient Position: Lying)   Pulse 92   Temp 96 °F (35.6 °C) (Axillary)   Resp 16   Ht 165.1 cm (65\")   Wt 53.1 kg (117 lb)   SpO2 100%   BMI 19.47 kg/m²   Intake/Output last 3 shifts:  No intake/output data recorded.  Intake/Output this shift:  I/O this shift:  In: 100 [P.O.:100]  Out: -     PHYSICAL EXAM:    General: Well-developed, thin 67-year-old female who is alert, cooperative, no distress, appears stated age  Head:  Normocephalic, atraumatic, mucous membranes moist  Eyes:  Conjunctiva/corneas clear, EOM's intact     Neck:  Supple,  no bruit  Lungs: Very coarse to auscultation with expiratory wheezes bilaterally, decreased airflow.  Chest wall: No tenderness  Heart::  Regular rate and rhythm, S1 and S2 normal, 1/6 holosystolic murmur.  No rub or gallop  Abdomen: Soft, non-tender, nondistended bowel sounds active  Extremities: Trace edema to lower extremities  Pulses: Diminished pedal pulses.  Skin:  No rashes or lesions  Neuro/psych: A&O x3. CN II through XII are grossly intact with flat affect      Scheduled Meds:      carvedilol, 3.125 mg, Oral, BID With Meals  cefTRIAXone, 1 g, Intravenous, Q24H  enoxaparin, 40 mg, Subcutaneous, Q24H  furosemide, 40 mg, Intravenous, Daily  ipratropium-albuterol, 3 mL, Nebulization, 4x Daily - RT  LORazepam, 0.5 mg, Intravenous, Q6H   Followed by  [START " ON 3/14/2022] LORazepam, 0.5 mg, Intravenous, Q8H  nystatin, , Topical, Q12H  pantoprazole, 40 mg, Oral, Q AM  sodium chloride, 10 mL, Intravenous, Q12H        Continuous Infusions:         PRN Meds:      albuterol    LORazepam **OR** LORazepam **OR** LORazepam **OR** LORazepam **OR** LORazepam **OR** LORazepam    sodium chloride        Results Review:     I reviewed the patient's new clinical results.    CBC    Results from last 7 days   Lab Units 03/13/22  0631   WBC 10*3/mm3 10.20   HEMOGLOBIN g/dL 11.1*   PLATELETS 10*3/mm3 114*     Cr Clearance Estimated Creatinine Clearance: 58.8 mL/min (by C-G formula based on SCr of 0.62 mg/dL).  Coag   Results from last 7 days   Lab Units 03/13/22  0631   INR  1.21*     HbA1C No results found for: HGBA1C  Blood Glucose No results found for: POCGLU  Infection     CMP   Results from last 7 days   Lab Units 03/13/22  0631   SODIUM mmol/L 116*   POTASSIUM mmol/L 6.1*   CHLORIDE mmol/L 77*   CO2 mmol/L 35.0*   BUN mg/dL 10   CREATININE mg/dL 0.62   GLUCOSE mg/dL 115*   ALBUMIN g/dL 2.40*   BILIRUBIN mg/dL 2.9*   ALK PHOS U/L 137*   AST (SGOT) U/L 58*   ALT (SGPT) U/L 64*     ABG    Results from last 7 days   Lab Units 03/13/22  0459   PH, ARTERIAL pH units 7.391   PCO2, ARTERIAL mm Hg 60.5*   PO2 ART mm Hg 51.5*   O2 SATURATION ART % 84.4*   BASE EXCESS ART mmol/L 9.8*     UA      SHANICE  No results found for: POCMETH, POCAMPHET, POCBARBITUR, POCBENZO, POCCOCAINE, POCOPIATES, POCOXYCODO, POCPHENCYC, POCPROPOXY, POCTHC, POCTRICYC  Lysis Labs   Results from last 7 days   Lab Units 03/13/22  0631   INR  1.21*   HEMOGLOBIN g/dL 11.1*   PLATELETS 10*3/mm3 114*   CREATININE mg/dL 0.62     Radiology(recent) XR Chest 1 View    Result Date: 3/13/2022  IMPRESSION : Hyperexpansion of the lungs compatible with severe COPD, emphysema.  Patchy opacity at the left apex may represent scarring although pneumonia cannot be excluded.  Electronically Signed By-Jeanmarie López On:3/13/2022 10:02 AM  This report was finalized on [id number] by  Jeanmarie López, .        Results from last 7 days   Lab Units 03/13/22 0631   TROPONIN T ng/mL <0.010       Xrays, labs reviewed personally by physician.    ECG/EMG Results (most recent)       Procedure Component Value Units Date/Time    Adult Transthoracic Echo Complete With Contrast if Necessary Per Protocol [[id number]] Resulted: 03/13/22 0929     Updated: 03/13/22 0931     Target HR (85%) 132 bpm      Max. Pred. HR (100%) 155 bpm      Ao root diam 2.16 cm      Ao pk gabriel 119.8 cm/sec      Ao V2 VTI 22.3 cm      RAS(I,D) 1.79 cm2      EDV(cubed) 24.2 ml      EDV(MOD-sp4) 29.2 ml      EF(MOD-bp) 63.0 %      EF(MOD-sp4) 62.8 %      ESV(cubed) 10.5 ml      ESV(MOD-sp4) 10.9 ml      IVS/LVPW 1.13 cm      LV mass(C)d 58.6 grams      LV V1 max PG 4.8 mmHg      LV V1 mean PG 1.97 mmHg      LV V1 max 110.1 cm/sec      LVPWd 0.78 cm      MV dec slope 340.5 cm/sec2      MV dec time 0.13 msec      MV V2 VTI 16.7 cm      MVA(VTI) 2.39 cm2      PA acc time 0.11 sec      PA pr(Accel) 29.2 mmHg      PA V2 max 118.0 cm/sec      RAP systole 3.0 mmHg      RV V1 max PG 6.6 mmHg      RV V1 max 128.3 cm/sec      RV V1 VTI 21.2 cm      RVIDd 3.1 cm      RVSP(TR) 18.3 mmHg      SI(MOD-sp4) 11.6 ml/m2      SV(LVOT) 40.0 ml      SV(MOD-sp4) 18.3 ml      SV(RVOT) 82.3 ml      TR max PG 15.3 mmHg      Ao max PG 5.7 mmHg      Ao mean PG 3.1 mmHg      FS 24.3 %      IVSd 0.88 cm      LA dimension(2D) 2.5 cm      LV V1 VTI 18.9 cm      LVIDd 2.9 cm      LVIDs 2.19 cm      LVOT area 2.11 cm2      LVOT diam 1.64 cm      MV E/A 0.70     MV max PG 3.4 mmHg      MV mean PG 1.91 mmHg      RVOT diam 2.22 cm      MV A max gabriel 64.8 cm/sec      MV E max gabriel 45.1 cm/sec      TR max gabriel 195.7 cm/sec      LV Umaña Vol (BSA corrected) 18.6 cm2      LV Sys Vol (BSA corrected) 6.9 cm2     Narrative:      · Estimated left ventricular EF was in agreement with the calculated left   ventricular EF. Left ventricular  ejection fraction appears to be 61 - 65%.   Left ventricular systolic function is normal.  · The right atrial cavity is moderately dilated.  · The study is technically difficult for diagnosis.  · The quality of the study is limited due to Subcostal windows only for   cardiac imaging..                 Medication Review:   I have reviewed the patient's current medication list  Scheduled Meds:carvedilol, 3.125 mg, Oral, BID With Meals  cefTRIAXone, 1 g, Intravenous, Q24H  enoxaparin, 40 mg, Subcutaneous, Q24H  furosemide, 40 mg, Intravenous, Daily  ipratropium-albuterol, 3 mL, Nebulization, 4x Daily - RT  LORazepam, 0.5 mg, Intravenous, Q6H   Followed by  [START ON 3/14/2022] LORazepam, 0.5 mg, Intravenous, Q8H  nystatin, , Topical, Q12H  pantoprazole, 40 mg, Oral, Q AM  sodium chloride, 10 mL, Intravenous, Q12H      Continuous Infusions:   PRN Meds:.  albuterol    LORazepam **OR** LORazepam **OR** LORazepam **OR** LORazepam **OR** LORazepam **OR** LORazepam    sodium chloride    Imaging:  Imaging Results (Last 72 Hours)       Procedure Component Value Units Date/Time    XR Chest 1 View [[id number]] Collected: 03/13/22 1000     Updated: 03/13/22 1004    Narrative:         DATE OF EXAM:   3/13/2022 6:16 AM     PROCEDURE:   XR CHEST 1 VW-     INDICATIONS:   COPD and CHF     COMPARISON:  No Comparisons Available     TECHNIQUE:   Portable Chest     FINDINGS:      Study limited by overlying support and monitoring apparatus     Lungs are hyperexpanded with advanced emphysematous changes noted. This  is somewhat asymmetric on the left. Scarring noted at the left apex. The  heart size is within normal limits. There is evidence of old  granulomatous disease. No definite pneumothorax noted. Scarring is noted  at the right apex. Osseous structures demonstrate no acute abnormality        Impression:      IMPRESSION :   Hyperexpansion of the lungs compatible with severe COPD, emphysema.     Patchy opacity at the left apex may  "represent scarring although  pneumonia cannot be excluded.     Electronically Signed By-Jeanmarie López On:3/13/2022 10:02 AM  This report was finalized on [id number] by  Jeanmarie López, .              NAVNEET Taylor  03/13/22  12:48 EDT       EMR Dragon/Transcription:   \"Dictated utilizing Dragon dictation\".                 Electronically signed by NAVNEET Taylor, 03/13/22, 12:48 PM EDT.    Cardiology attending:  Seen and examined.  Chart and labs reviewed.  History and exam findings are verified with above changes noted.  Assessment and plan notated by APC after being formulated by attending consultant.  Note that greater than 50% of the time spent in care of the patient was provided by attending consultant.  Patient denies any chest pain pressure heaviness or tightness.  Reports feeling comfortable at the present time.  2D echocardiogram with normal left ventricular systolic function.  Monitor volume status, renal function, and electrolytes along with nephrology.    "

## 2022-03-13 NOTE — CONSULTS
[facility] Consult Note      Patient Name: Shanelle Solis  : 1955  MRN: [id number]  Primary Care Physician:  Provider, No Known  Referring Physician: No Known Provider  Date of admission: 3/13/2022    Subjective     Reason for Consult:   MONY / Hyperkalemia     HPI:   Shanelle Solis is a 67 y.o. female known to have history of chronic alcohol abuse, history of liver cirrhosis, history of asthma, history of breast cancer status post radiation who was transferred from [facility] emergency department for management of tachycardia and hypotension in addition to confusion.  In the [facility] hospital she was found to have normal blood pressure, BNP of 1514, sodium of 114, alk phos 136, ALT 60, AST 80, BUN 3.4, lactate 6.0 D-dimer 0.7, MCV of 70.8 and platelet count of 120.    Labs at our facility showed a sodium 116, potassium 6.1, CO2 35, creatinine 0.6, BUN 10, calcium 8.6, phosphorus 3.7, lactate 1.9, magnesium 1.6  Nephrology team was consulted for management  To note that most of the history was taken per chart review as the patient very poor historian    Review of Systems:   14 point review of systems is otherwise negative except for mentioned above on HPI    Personal History     Past Medical History:   Diagnosis Date   • Alcohol abuse    • Alcoholic cirrhosis (HCC)    • Asthma    • Cataract    • Cellulitis of leg    • Chronic gastritis    • COPD (chronic obstructive pulmonary disease) (HCC)    • GERD (gastroesophageal reflux disease)    • History of cervical cancer     Treated with conization   • History of right breast cancer     Treated with chemotherapy       Past Surgical History:   Procedure Laterality Date   • COLONOSCOPY     • FOOT SURGERY         Family History: family history includes Cancer in her maternal grandmother; Pneumonia in her father; Suicide Attempts in her mother.    Social History:  reports that she has been smoking cigarettes. She started smoking  today. She has been smoking about 0.25 packs per day. She uses smokeless tobacco. She reports current alcohol use. She reports that she does not use drugs.    Home Medications:  Prior to Admission medications    Medication Sig Start Date End Date Taking? Authorizing Provider   cephalexin (KEFLEX) 500 MG capsule Take 500 mg by mouth 2 (Two) Times a Day. 3/9/22 3/15/22 Yes Gus Gary MD   cholecalciferol (VITAMIN D3) 25 MCG (1000 UT) tablet Take 1,000 Units by mouth Daily.   Yes Gus Gary MD   furosemide (LASIX) 40 MG tablet Take 40 mg by mouth Daily.   Yes Gus Gary MD   guaiFENesin (MUCINEX) 600 MG 12 hr tablet Take 600 mg by mouth 2 (Two) Times a Day.   Yes Gus Gary MD   magnesium oxide (MAGOX) 400 (241.3 Mg) MG tablet tablet Take 400 mg by mouth 2 (Two) Times a Day.   Yes Gus Gary MD   metoprolol tartrate (LOPRESSOR) 25 MG tablet Take 12.5 mg by mouth 2 (Two) Times a Day.   Yes Gus Gary MD   potassium chloride (K-DUR,KLOR-CON) 10 MEQ CR tablet Take  by mouth.   Yes Gus Gary MD   traZODone (DESYREL) 50 MG tablet Take 25 mg by mouth Every Night.   Yes Gus Gary MD       Allergies:  No Known Allergies    Objective     Vitals:   Temp:  [96 °F (35.6 °C)-98.1 °F (36.7 °C)] 96 °F (35.6 °C)  Heart Rate:  [] 92  Resp:  [16-19] 16  BP: (107-121)/(55-95) 111/58  Flow (L/min):  [3] 3    Intake/Output Summary (Last 24 hours) at 3/13/2022 1407  Last data filed at 3/13/2022 0800  Gross per 24 hour   Intake 100 ml   Output --   Net 100 ml       Physical Exam:   Constitutional: Wake up to command.  Cachectic.  Frail  HEENT: Sclera anicteric, no conjunctival injection  Neck: Supple, no thyromegaly, no lymphadenopathy, trachea at midline, no JVD  Respiratory: Clear to auscultation bilaterally, nonlabored respiration  Cardiovascular: RRR, no murmurs, no rubs or gallops, no carotid bruit  Gastrointestinal: Positive bowel sounds,  abdomen is soft, nontender and nondistended  : No palpable bladder  Musculoskeletal: 1+ pitting edema at the ankles, no clubbing or cyanosis  Psychiatric: Appropriate affect, cooperative  Neurologic: Oriented x3, moving all extremities, normal speech and mental status  Skin: Warm and dry       Scheduled Meds:     calcium gluconate, 1 g, Intravenous, Once  carvedilol, 3.125 mg, Oral, BID With Meals  cefTRIAXone, 1 g, Intravenous, Q24H  enoxaparin, 40 mg, Subcutaneous, Q24H  furosemide, 40 mg, Intravenous, Daily  ipratropium-albuterol, 3 mL, Nebulization, 4x Daily - RT  LORazepam, 0.5 mg, Intravenous, Q6H   Followed by  [START ON 3/14/2022] LORazepam, 0.5 mg, Intravenous, Q8H  nystatin, , Topical, Q12H  pantoprazole, 40 mg, Oral, Q AM  sodium chloride, 10 mL, Intravenous, Q12H      IV Meds:        Results Reviewed:   I have personally reviewed the results from the time of this admission to 3/13/2022 14:07 EDT     Lab Results   Component Value Date    GLUCOSE 115 (H) 03/13/2022    CALCIUM 8.6 03/13/2022     (C) 03/13/2022    K 6.1 (C) 03/13/2022    CO2 35.0 (H) 03/13/2022    CL 77 (L) 03/13/2022    BUN 10 03/13/2022    CREATININE 0.62 03/13/2022    BCR 16.1 03/13/2022    ANIONGAP 4.0 (L) 03/13/2022      Lab Results   Component Value Date    MG 1.6 03/13/2022    PHOS 3.7 03/13/2022    ALBUMIN 2.40 (L) 03/13/2022           Assessment / Plan     ASSESSMENT:  1.  Hyponatremia: Multifactorial secondary to poor oral intake, possible.  2.  Hyperkalemia likely secondary to potassium replacement.  Patient was given Lokelma and calcium gluconate awaiting repeat test.  3.  History of liver cirrhosis due to alcohol.  4.  Hypotension likely secondary to hypovolemia and diuretic therapy.  5.  Chronic alcoholism.  6.  Acute hypoxemic hypercapnic aspiratory failure.  On oxygen  7.  Possible cellulitis      Plan:   The patient looks hypovolemic on exam and cachectic. She had poor oral intake per nursing staff for couple of days   We will  DC Lasix at this time.   Check urine sodium, urine osmolarity  Start IV fluids with normal saline at 75 cc an hour  Await for the repeat renal panel   We will check urinalysis, urine culture if indicated.  Follow sodium in 2 hours  Stop potassium replacement  Start Midodrine  5mg TID     Thank you for involving us in the care of Shanelle Solis.  Please feel free to call with any questions.    Juju Lucia MD  03/13/22  14:07 EDT    [facility]  [phone]      Much of this encounter note is an electronic transcription/translation of spoken language to printed text. The electronic translation of spoken language may permit erroneous, or at times, nonsensical words or phrases to be inadvertently transcribed; Although I have reviewed the note for such errors, some may still exist.

## 2022-03-13 NOTE — PLAN OF CARE
Goal Outcome Evaluation:  Plan of Care Reviewed With: patient           Outcome Evaluation: 66 yo female admitted from home with weakness, confusion, SOA, hypotension.  d/o LE cellulitis, CHF and sepsis.  Pt has severe skin excoriation BLEs, buttocks and perineal area.  Pt reports she lives alone, has a caregiver 2x/week and admits to spending most of her time in her w/c recently and not being able to stand due to weakness few days prior to admission.  Pt is very drowsy at time of eval, needs encouragement to participate with therapy.  Pt required mod A to stand from EOB and transfer to chair to sit up for her lunch.  Will follow 3x/week with recommendation of IP rehab at d/c.

## 2022-03-13 NOTE — H&P
"    [facility]      Patient Name: Shanelle Solis  : 1955  MRN: [id number]  Primary Care Physician:  Provider, No Known  Date of admission: 3/13/2022      Subjective      Chief Complaint: General weakness.    History of Present Illness:   67-year-old  female with history of alcohol abuse, cirrhosis, asthma/COPD, GERD, breast cancer treated with radiation, cervical cancer, and tobacco dependence, presented to the ED of [facility] via EMS after her caregiver came yesterday and checked her to find out low blood pressure and tachycardia.  Confusion was also reported and patient stated that she been having shortness of breath, leg swelling, unable to get out of her chair by herself.  Patient reported that she has been getting worse over the past few months.  She states that she was recently placed on Keflex by her PCP to treat leg cellulitis since Tuesday.  She denies having fever or chills, chest pain, cough, or any other complaint.    Emergency department course:  Afebrile, tachycardic, soft BP, no acute distress.  Physical examination per ED physician was significant for presence of red rash and extensive skin excoriation in the suprapubic area perineal thighs and coccygeal area.  Labs were significant for BNP 1514, serum sodium level 114, alkaline phosphatase 136, ALT 60, AST 80, total bilirubin 3.4, lactate 6.0, D-dimer 0.7, WBC count 17.8 and platelet count 120.  Chest x-ray reported \"no acute cardiopulmonary process, severe emphysema, and moderate to large hiatal hernia \".  EKG showed sinus tachycardia with no ischemic changes.  Patient was diagnosed by ED physician as having sepsis and CHF but it seems nothing was given for the patient over there.  He called hospitalist Dr. Hoang requesting transferred to our institution and the transfer was accepted.    ROS   Please refer to HPI. All other systems were reviewed and were negative.     Personal " History     Past Medical History:   Diagnosis Date   • Alcohol abuse    • Alcoholic cirrhosis (HCC)    • Asthma    • Cataract    • Cellulitis of leg    • Chronic gastritis    • COPD (chronic obstructive pulmonary disease) (HCC)    • GERD (gastroesophageal reflux disease)    • History of cervical cancer     Treated with conization   • History of right breast cancer     Treated with chemotherapy       Past Surgical History:   Procedure Laterality Date   • COLONOSCOPY     • FOOT SURGERY         Family History: family history includes Cancer in her maternal grandmother; Pneumonia in her father; Suicide Attempts in her mother. Otherwise pertinent FHx was reviewed and not pertinent to current issue.    Social History:  reports that she has been smoking cigarettes. She started smoking today. She has been smoking about 0.25 packs per day. She uses smokeless tobacco. She reports current alcohol use. She reports that she does not use drugs.    Home Medications:  Prior to Admission Medications     None            Allergies:  No Known Allergies    Objective      Vitals:   Temp:  [98.1 °F (36.7 °C)] 98.1 °F (36.7 °C)  Heart Rate:  [101] 101  Resp:  [19] 19  BP: (121)/(94) 121/94    Physical Exam   General: Cachectic, alert and oriented x3, no acute distress.   Eyes: Tinge of jaundice, moist conjunctivae with no lig lag; PERRLA.  HENT:  Normocephalic, atraumatic, moist oral mucosa.  Neck: Supple, no bruit, JVP positive, no thyroid or lymph node enlargement, trachea central,   Lungs: Bilateral fine crepitations and expiratory rhonchi.  Heart: RRR, no murmur or rub.   Abdomen:  Soft, not tender, not distended, no organomegaly, bowel sounds positive.   Extremities: Mild leg edema with faint evidence of cellulitis, no calf tenderness, normal range of movement, pedal pulses intact.   Skin: Extensive skin excoriation and white areas of red rash involving lower abdominal, perineal, sacrococcygeal and upper thighs.  Neurology:  Grossly  intact.   Psychiatric exam: Pleasant, cooperative, appropriate mood and affect, intact judgment and insight.      Result Review    Result Review:  I have personally reviewed the results from the time of this admission to 3/13/2022 04:43 EDT and agree with these findings:  [x]  Laboratory  [x]  Microbiology  [x]  Radiology  [x]  EKG/Telemetry   []  Cardiology/Vascular   []  Pathology  [x]  Old records  []  Other:    Assessment/Plan        Active Hospital Problems:  Active Hospital Problems    Diagnosis    • **CHF exacerbation (HCC)    • Hyponatremia with excess extracellular fluid volume    • Elevated liver enzymes    • Thrombocytopenia (HCC)    • Lactic acidosis    • SIRS (systemic inflammatory response syndrome) (HCC)    • Skin excoriation      Assessment:  1.  CHF exacerbation.  -Consult cardiology.  -Implement CHF protocol with daily weight, strict I's and O's, fluid restriction, cardiac diet, IV Lasix, beta-blocker and ACE inhibitor.  -2D echocardiogram.  -Chest x-ray.  -Check troponin and proBNP levels.  Check TSH level.  -  2.  Hypo osmolar hyponatremia-likely hypervolemic.  -Consult nephrology.  -Fluid restrictions.  -Close monitoring of serum sodium level.  -Serum and urine osmolalities and random urine sodium level.    3.  Sepsis with lactic acidosis.  -Empiric antibiotic coverage with Rocephin pending the blood culture results.  -Trend lactic acid level.  -Monitor hemodynamic status, renal function, and electrolyte levels.    4.  Leg cellulitis with extensive skin excoriation and cutaneous candidiasis.  -Consult wound care.  -Nystatin powder twice daily.    5.  Hx of alcoholic cirrhosis with elevated liver enzymes, jaundice, and thrombocytopenia.  -Check CMP and GGT.  -Monitor platelet count.    6.  General weakness and cachexia.  -PT/OT evaluation.  -Inpatient nutrition consult.    7.  COPD/asthma -patient does not use her home O2.  -Supplemental O2.  -Inhalers and nebulizers.  -ABG analysis on room  air.    8.  GERD.  -On PPI.    9.  Alcohol abuse.  -CIWA protocol.    10.  Tobacco dependence.  -Tobacco cessation education.        DVT prophylaxis:  Subcutaneous Lovenox.    CODE STATUS:    Medical Intervention Limits: NO intubation (DNI); NO cardioversion; NO antiarrhythmic drugs  Level Of Support Discussed With: Patient  Code Status (Patient has no pulse and is not breathing): No CPR (Do Not Attempt to Resuscitate)  Medical Interventions (Patient has pulse or is breathing): Limited Support    Admission Status:  I believe this patient meets inpatient status.    I discussed the patient's findings and my recommendations with patient.    This patient has been examined wearing appropriate Personal Protective Equipment and discussed with hospital infection control department. 03/13/22      Signature: Electronically signed by Nilda Higgins MD, 03/13/22, 4:53 AM EDT.

## 2022-03-13 NOTE — THERAPY EVALUATION
Patient Name: Shanelle Solis  : 1955    MRN: [id number]                              Today's Date: 3/13/2022       Admit Date: 3/13/2022    Visit Dx: No diagnosis found.  Patient Active Problem List   Diagnosis   • CHF exacerbation (HCC)   • Hyponatremia with excess extracellular fluid volume   • Elevated liver enzymes   • Thrombocytopenia (HCC)   • Lactic acidosis   • SIRS (systemic inflammatory response syndrome) (HCC)   • Skin excoriation     Past Medical History:   Diagnosis Date   • Alcohol abuse    • Alcoholic cirrhosis (HCC)    • Asthma    • Cataract    • Cellulitis of leg    • Chronic gastritis    • COPD (chronic obstructive pulmonary disease) (HCC)    • GERD (gastroesophageal reflux disease)    • History of cervical cancer     Treated with conization   • History of right breast cancer     Treated with chemotherapy     Past Surgical History:   Procedure Laterality Date   • COLONOSCOPY     • FOOT SURGERY        General Information     [facility] Name 22 1519          Physical Therapy Time and Intention    Document Type evaluation  -     Mode of Treatment physical therapy  -     Row Name 22 1519          General Information    Patient Profile Reviewed yes  -     Prior Level of Function independent:;w/c or scooter  reports mainly using w/c recently, past 2 days could barely stand  -     Existing Precautions/Restrictions fall;oxygen therapy device and L/min  -     Barriers to Rehab medically complex;previous functional deficit;ineffective coping  -     Row Name 22 1519          Living Environment    People in Home alone  pt states she has a caregiver 2 days/week  -     Row Name 22 1519          Cognition    Orientation Status (Cognition) oriented to;person;place;disoriented to;situation  -     Row Name 22 1519          Safety Issues, Functional Mobility    Safety Issues Affecting Function (Mobility) insight into deficits/self-awareness;judgment;problem-solving;safety  precaution awareness;safety precautions follow-through/compliance;sequencing abilities  -     Impairments Affecting Function (Mobility) balance;cognition;endurance/activity tolerance;pain;strength;shortness of breath;range of motion (ROM);postural/trunk control  -           User Key  (r) = Recorded By, (t) = Taken By, (c) = Cosigned By    Initials Name Provider Type     Emily Mccartney PT Physical Therapist               Mobility     Row Name 03/13/22 1520          Bed Mobility    Bed Mobility supine-sit  -     Supine-Sit [place] (Bed Mobility) minimum assist (75% patient effort);moderate assist (50% patient effort);1 person assist;verbal cues  -     Assistive Device (Bed Mobility) head of bed elevated  -     Row Name 03/13/22 1520          Transfers    Comment, (Transfers) pt needs a lot of encouragement to get OOB, does not stand fully upright during transfer, squat pivot bed to chair.  -     Row Name 03/13/22 1520          Bed-Chair Transfer    Bed-Chair [place] (Transfers) moderate assist (50% patient effort);1 person assist  -[facility] Name 03/13/22 1520          Sit-Stand Transfer    Sit-Stand [place] (Transfers) moderate assist (50% patient effort);1 person assist  -     Assistive Device (Sit-Stand Transfers) walker, front-wheeled  -[facility] Name 03/13/22 1520          Gait/Stairs (Locomotion)    [place] Level (Gait) not tested  -           User Key  (r) = Recorded By, (t) = Taken By, (c) = Cosigned By    Initials Name Provider Type     Emily Mccartney PT Physical Therapist               Obj/Interventions     Row Name 03/13/22 1522          Range of Motion Comprehensive    General Range of Motion bilateral upper extremity ROM WFL  -     Comment, General Range of Motion BLEs with knees contracted in bed, does not fully extend passively and c/o LE pain and weakness.  B feet with waffle boots, limited active DF.  -     Row Name 03/13/22 1522          Strength  Comprehensive (MMT)    Comment, General Manual Muscle Testing (MMT) Assessment UEs 3+/5, LEs hips 3/5, knees 2/5, ankles 2/5  -     Row Name 03/13/22 1522          Motor Skills    Therapeutic Exercise --  AAROM BLEs in supine to gain knee extension  -[facility] Name 03/13/22 1522          Balance    Balance Assessment sitting static balance;sitting dynamic balance;standing static balance  -     Static Sitting Balance contact guard  -     Dynamic Sitting Balance minimal assist  -     Static Standing Balance moderate assist  -           User Key  (r) = Recorded By, (t) = Taken By, (c) = Cosigned By    Initials Name Provider Type    Emily Castaneda, PT Physical Therapist               Goals/Plan     Row Name 03/13/22 1528          Bed Mobility Goal 1 (PT)    Activity/Assistive Device (Bed Mobility Goal 1, PT) bed mobility activities, all  -     [place] Level/Cues Needed (Bed Mobility Goal 1, PT) modified independence  -     Time Frame (Bed Mobility Goal 1, PT) long term goal (LTG);2 weeks  -[facility] Name 03/13/22 1528          Transfer Goal 1 (PT)    Activity/Assistive Device (Transfer Goal 1, PT) sit-to-stand/stand-to-sit;bed-to-chair/chair-to-bed  -     [place] Level/Cues Needed (Transfer Goal 1, PT) standby assist  -     Time Frame (Transfer Goal 1, PT) long term goal (LTG);2 weeks  -[facility] Name 03/13/22 1528          Gait Training Goal 1 (PT)    Activity/Assistive Device (Gait Training Goal 1, PT) gait (walking locomotion);assistive device use;walker, rolling  -     [place] Level (Gait Training Goal 1, PT) minimum assist (75% or more patient effort)  -     Distance (Gait Training Goal 1, PT) 30'  -     Time Frame (Gait Training Goal 1, PT) long term goal (LTG);2 weeks  -           User Key  (r) = Recorded By, (t) = Taken By, (c) = Cosigned By    Initials Name Provider Type    Emily Castaneda, PT Physical Therapist               Clinical Impression     Row Name  03/13/22 1525          Pain    Additional Documentation Pain Scale: FACES Pre/Post-Treatment (Group)  -[facility] Name 03/13/22 1525          Pain Scale: FACES Pre/Post-Treatment    Pain: FACES Scale, Pretreatment 2-->hurts little bit  -     Posttreatment Pain Rating 4-->hurts little more  -     Pain Location - Side/Orientation Bilateral  -     Pain Location lower  -     Pain Location - extremity  -[facility] Name 03/13/22 1525          Plan of Care Review    Plan of Care Reviewed With patient  -     Outcome Evaluation 66 yo female admitted from home with weakness, confusion, SOA, hypotension.  d/o LE cellulitis, CHF and sepsis.  Pt has severe skin excoriation BLEs, buttocks and perineal area.  Pt reports she lives alone, has a caregiver 2x/week and admits to spending most of her time in her w/c recently and not being able to stand due to weakness few days prior to admission.  Pt is very drowsy at time of eval, needs encouragement to participate with therapy.  Pt required mod A to stand from EOB and transfer to chair to sit up for her lunch.  Will follow 3x/week with recommendation of IP rehab at d/c.  -[facility] Name 03/13/22 1525          Therapy Assessment/Plan (PT)    Rehab Potential (PT) fair, will monitor progress closely  -     Criteria for Skilled Interventions Met (PT) yes;skilled treatment is necessary  -[facility] Name 03/13/22 1525          Vital Signs    O2 Delivery Pre Treatment nasal cannula  finishing breathing tx wtih RT  -     Intra SpO2 (%) 98  -     O2 Delivery Intra Treatment nasal cannula  3L  -     Post SpO2 (%) --  inaccurate reading on pulse oximeter  -     O2 Delivery Post Treatment nasal cannula  -[facility] Name 03/13/22 1525          Positioning and Restraints    Pre-Treatment Position in bed  -     Post Treatment Position chair  -     In Chair notified nsg;sitting;call light within reach;encouraged to call for assist;exit alarm on  -           User Key  (r) =  Recorded By, (t) = Taken By, (c) = Cosigned By    Initials Name Provider Type     Emily Mccartney PT Physical Therapist               Outcome Measures     Row Name 03/13/22 1529          How much help from another person do you currently need...    Turning from your back to your side while in flat bed without using bedrails? 2  -JH     Moving from lying on back to sitting on the side of a flat bed without bedrails? 2  -JH     Moving to and from a bed to a chair (including a wheelchair)? 2  -JH     Standing up from a chair using your arms (e.g., wheelchair, bedside chair)? 2  -JH     Climbing 3-5 steps with a railing? 1  -JH     To walk in hospital room? 1  -     AM-PAC 6 Clicks Score (PT) 10  -     Row Name 03/13/22 1529          Functional Assessment    Outcome Measure Options AM-PAC 6 Clicks Basic Mobility (PT)  -           User Key  (r) = Recorded By, (t) = Taken By, (c) = Cosigned By    Initials Name Provider Type     Emily Mccartney PT Physical Therapist                             Physical Therapy Education                 Title: PT OT SLP Therapies (In Progress)     Topic: Physical Therapy (In Progress)     Point: Mobility training (In Progress)     Learning Progress Summary           Patient Acceptance, E, NR by  at 3/13/2022 1529                   Point: Precautions (In Progress)     Learning Progress Summary           Patient Acceptance, E, NR by  at 3/13/2022 1529                               User Key     Initials Effective Dates Name Provider Type [facility] 06/16/21 -  Emily Mccartney PT Physical Therapist PT              PT Recommendation and Plan  Planned Therapy Interventions (PT): balance training, bed mobility training, gait training, transfer training, postural re-education, ROM (range of motion), strengthening, patient/family education  Plan of Care Reviewed With: patient  Outcome Evaluation: 68 yo female admitted from home with weakness, confusion, SOA, hypotension.  d/o LE  cellulitis, CHF and sepsis.  Pt has severe skin excoriation BLEs, buttocks and perineal area.  Pt reports she lives alone, has a caregiver 2x/week and admits to spending most of her time in her w/c recently and not being able to stand due to weakness few days prior to admission.  Pt is very drowsy at time of eval, needs encouragement to participate with therapy.  Pt required mod A to stand from EOB and transfer to chair to sit up for her lunch.  Will follow 3x/week with recommendation of IP rehab at d/c.     Time Calculation:    PT Charges     Row Name 03/13/22 1530             Time Calculation    Start Time 1150  -      Stop Time 1210  -      Time Calculation (min) 20 min  -      PT Received On 03/13/22  -      PT - Next Appointment 03/15/22  -      PT Goal Re-Cert Due Date 03/27/22  -            User Key  (r) = Recorded By, (t) = Taken By, (c) = Cosigned By    Initials Name Provider Type     Emily Mccartney PT Physical Therapist              Therapy Charges for Today     Code Description Service Date Service Provider Modifiers Qty    [id number]  PT EVAL HIGH COMPLEXITY 3 3/13/2022 Emily Mccartney PT GP 1          PT G-Codes  Outcome Measure Options: AM-PAC 6 Clicks Basic Mobility (PT)  AM-PAC 6 Clicks Score (PT): 10    Emily Mccartney PT  3/13/2022

## 2022-03-13 NOTE — PLAN OF CARE
Goal Outcome Evaluation:              Outcome Evaluation: Assisted patient back to bed, tolerated well. BP slightly low, asymptomatic.

## 2022-03-14 PROBLEM — E43 SEVERE MALNUTRITION: Status: ACTIVE | Noted: 2022-03-14

## 2022-03-14 PROBLEM — L89.150 UNSTAGEABLE PRESSURE ULCER OF SACRAL REGION: Status: ACTIVE | Noted: 2022-03-14

## 2022-03-14 PROBLEM — L30.8 DERMATITIS ASSOCIATED WITH MOISTURE: Status: ACTIVE | Noted: 2022-03-14

## 2022-03-14 LAB
ALBUMIN SERPL-MCNC: 2 G/DL (ref 3.5–5.2)
AMMONIA BLD-SCNC: 20 UMOL/L (ref 11–51)
ANION GAP SERPL CALCULATED.3IONS-SCNC: 0 MMOL/L (ref 5–15)
ANION GAP SERPL CALCULATED.3IONS-SCNC: 2 MMOL/L (ref 5–15)
ARTERIAL PATENCY WRIST A: POSITIVE
ATMOSPHERIC PRESS: ABNORMAL MM[HG]
BASE EXCESS BLDA CALC-SCNC: 11.9 MMOL/L (ref 0–3)
BASOPHILS # BLD AUTO: 0 10*3/MM3 (ref 0–0.2)
BASOPHILS # BLD AUTO: 0 10*3/MM3 (ref 0–0.2)
BASOPHILS NFR BLD AUTO: 0.3 % (ref 0–1.5)
BASOPHILS NFR BLD AUTO: 0.3 % (ref 0–1.5)
BDY SITE: ABNORMAL
BUN SERPL-MCNC: 11 MG/DL (ref 8–23)
BUN SERPL-MCNC: 11 MG/DL (ref 8–23)
BUN/CREAT SERPL: 18.6 (ref 7–25)
BUN/CREAT SERPL: 21.6 (ref 7–25)
CALCIUM SPEC-SCNC: 8.3 MG/DL (ref 8.6–10.5)
CALCIUM SPEC-SCNC: 8.5 MG/DL (ref 8.6–10.5)
CHLORIDE SERPL-SCNC: 83 MMOL/L (ref 98–107)
CHLORIDE SERPL-SCNC: 84 MMOL/L (ref 98–107)
CK SERPL-CCNC: 42 U/L (ref 20–180)
CO2 BLDA-SCNC: 41.5 MMOL/L (ref 22–29)
CO2 SERPL-SCNC: 36 MMOL/L (ref 22–29)
CO2 SERPL-SCNC: 39 MMOL/L (ref 22–29)
CREAT SERPL-MCNC: 0.51 MG/DL (ref 0.57–1)
CREAT SERPL-MCNC: 0.59 MG/DL (ref 0.57–1)
DEPRECATED RDW RBC AUTO: 61.3 FL (ref 37–54)
DEPRECATED RDW RBC AUTO: 63 FL (ref 37–54)
EGFRCR SERPLBLD CKD-EPI 2021: 100.2 ML/MIN/1.73
EGFRCR SERPLBLD CKD-EPI 2021: 103.7 ML/MIN/1.73
EOSINOPHIL # BLD AUTO: 0.2 10*3/MM3 (ref 0–0.4)
EOSINOPHIL # BLD AUTO: 0.2 10*3/MM3 (ref 0–0.4)
EOSINOPHIL NFR BLD AUTO: 3 % (ref 0.3–6.2)
EOSINOPHIL NFR BLD AUTO: 3.1 % (ref 0.3–6.2)
ERYTHROCYTE [DISTWIDTH] IN BLOOD BY AUTOMATED COUNT: 17.9 % (ref 12.3–15.4)
ERYTHROCYTE [DISTWIDTH] IN BLOOD BY AUTOMATED COUNT: 18.2 % (ref 12.3–15.4)
GLUCOSE BLDC GLUCOMTR-MCNC: 127 MG/DL (ref 70–105)
GLUCOSE SERPL-MCNC: 101 MG/DL (ref 65–99)
GLUCOSE SERPL-MCNC: 127 MG/DL (ref 65–99)
HCO3 BLDA-SCNC: 39.3 MMOL/L (ref 21–28)
HCT VFR BLD AUTO: 27.5 % (ref 34–46.6)
HCT VFR BLD AUTO: 29.3 % (ref 34–46.6)
HEMODILUTION: NO
HGB BLD-MCNC: 9.4 G/DL (ref 12–15.9)
HGB BLD-MCNC: 9.9 G/DL (ref 12–15.9)
INHALED O2 CONCENTRATION: <21 %
LYMPHOCYTES # BLD AUTO: 0.6 10*3/MM3 (ref 0.7–3.1)
LYMPHOCYTES # BLD AUTO: 0.6 10*3/MM3 (ref 0.7–3.1)
LYMPHOCYTES NFR BLD AUTO: 10.5 % (ref 19.6–45.3)
LYMPHOCYTES NFR BLD AUTO: 12.1 % (ref 19.6–45.3)
MAGNESIUM SERPL-MCNC: 1.6 MG/DL (ref 1.6–2.4)
MCH RBC QN AUTO: 33.8 PG (ref 26.6–33)
MCH RBC QN AUTO: 33.8 PG (ref 26.6–33)
MCHC RBC AUTO-ENTMCNC: 33.9 G/DL (ref 31.5–35.7)
MCHC RBC AUTO-ENTMCNC: 34 G/DL (ref 31.5–35.7)
MCV RBC AUTO: 99.3 FL (ref 79–97)
MCV RBC AUTO: 99.7 FL (ref 79–97)
MODALITY: ABNORMAL
MONOCYTES # BLD AUTO: 0.3 10*3/MM3 (ref 0.1–0.9)
MONOCYTES # BLD AUTO: 0.3 10*3/MM3 (ref 0.1–0.9)
MONOCYTES NFR BLD AUTO: 5.2 % (ref 5–12)
MONOCYTES NFR BLD AUTO: 5.8 % (ref 5–12)
NEUTROPHILS NFR BLD AUTO: 4.2 10*3/MM3 (ref 1.7–7)
NEUTROPHILS NFR BLD AUTO: 4.7 10*3/MM3 (ref 1.7–7)
NEUTROPHILS NFR BLD AUTO: 78.8 % (ref 42.7–76)
NEUTROPHILS NFR BLD AUTO: 80.9 % (ref 42.7–76)
NRBC BLD AUTO-RTO: 0.1 /100 WBC (ref 0–0.2)
NRBC BLD AUTO-RTO: 0.2 /100 WBC (ref 0–0.2)
OSMOLALITY UR: 348 MOSM/KG (ref 300–800)
PCO2 BLDA: 70.1 MM HG (ref 35–48)
PH BLDA: 7.36 PH UNITS (ref 7.35–7.45)
PHOSPHATE SERPL-MCNC: 2.7 MG/DL (ref 2.5–4.5)
PLATELET # BLD AUTO: 77 10*3/MM3 (ref 140–450)
PLATELET # BLD AUTO: 84 10*3/MM3 (ref 140–450)
PMV BLD AUTO: 8 FL (ref 6–12)
PMV BLD AUTO: 8.5 FL (ref 6–12)
PO2 BLDA: 100.9 MM HG (ref 83–108)
POTASSIUM SERPL-SCNC: 4.7 MMOL/L (ref 3.5–5.2)
POTASSIUM SERPL-SCNC: 4.7 MMOL/L (ref 3.5–5.2)
RBC # BLD AUTO: 2.77 10*6/MM3 (ref 3.77–5.28)
RBC # BLD AUTO: 2.94 10*6/MM3 (ref 3.77–5.28)
SAO2 % BLDCOA: 97.1 % (ref 94–98)
SODIUM SERPL-SCNC: 122 MMOL/L (ref 136–145)
SODIUM SERPL-SCNC: 122 MMOL/L (ref 136–145)
SODIUM UR-SCNC: <20 MMOL/L
URATE SERPL-MCNC: 6.3 MG/DL (ref 2.4–5.7)
WBC NRBC COR # BLD: 5.3 10*3/MM3 (ref 3.4–10.8)
WBC NRBC COR # BLD: 5.8 10*3/MM3 (ref 3.4–10.8)

## 2022-03-14 PROCEDURE — 82962 GLUCOSE BLOOD TEST: CPT

## 2022-03-14 PROCEDURE — 25010000002 CEFTRIAXONE PER 250 MG: Performed by: INTERNAL MEDICINE

## 2022-03-14 PROCEDURE — 85025 COMPLETE CBC W/AUTO DIFF WBC: CPT | Performed by: HOSPITALIST

## 2022-03-14 PROCEDURE — 99231 SBSQ HOSP IP/OBS SF/LOW 25: CPT | Performed by: NURSE PRACTITIONER

## 2022-03-14 PROCEDURE — 80048 BASIC METABOLIC PNL TOTAL CA: CPT | Performed by: HOSPITALIST

## 2022-03-14 PROCEDURE — 25010000002 LORAZEPAM PER 2 MG: Performed by: INTERNAL MEDICINE

## 2022-03-14 PROCEDURE — 36600 WITHDRAWAL OF ARTERIAL BLOOD: CPT

## 2022-03-14 PROCEDURE — 99232 SBSQ HOSP IP/OBS MODERATE 35: CPT | Performed by: HOSPITALIST

## 2022-03-14 PROCEDURE — 97166 OT EVAL MOD COMPLEX 45 MIN: CPT

## 2022-03-14 PROCEDURE — 94799 UNLISTED PULMONARY SVC/PX: CPT

## 2022-03-14 PROCEDURE — 83735 ASSAY OF MAGNESIUM: CPT | Performed by: HOSPITALIST

## 2022-03-14 PROCEDURE — 84300 ASSAY OF URINE SODIUM: CPT | Performed by: HOSPITALIST

## 2022-03-14 PROCEDURE — 80069 RENAL FUNCTION PANEL: CPT | Performed by: HOSPITALIST

## 2022-03-14 PROCEDURE — 82803 BLOOD GASES ANY COMBINATION: CPT

## 2022-03-14 PROCEDURE — 25010000002 ENOXAPARIN PER 10 MG: Performed by: INTERNAL MEDICINE

## 2022-03-14 PROCEDURE — 82140 ASSAY OF AMMONIA: CPT | Performed by: HOSPITALIST

## 2022-03-14 PROCEDURE — 99232 SBSQ HOSP IP/OBS MODERATE 35: CPT | Performed by: INTERNAL MEDICINE

## 2022-03-14 PROCEDURE — 82550 ASSAY OF CK (CPK): CPT | Performed by: HOSPITALIST

## 2022-03-14 PROCEDURE — 83935 ASSAY OF URINE OSMOLALITY: CPT | Performed by: HOSPITALIST

## 2022-03-14 PROCEDURE — 84550 ASSAY OF BLOOD/URIC ACID: CPT | Performed by: HOSPITALIST

## 2022-03-14 RX ORDER — CEFTRIAXONE 1 G/50ML
1 INJECTION, SOLUTION INTRAVENOUS EVERY 24 HOURS
Status: COMPLETED | OUTPATIENT
Start: 2022-03-15 | End: 2022-03-15

## 2022-03-14 RX ORDER — NOREPINEPHRINE BIT/0.9 % NACL 8 MG/250ML
.02-.3 INFUSION BOTTLE (ML) INTRAVENOUS
Status: DISCONTINUED | OUTPATIENT
Start: 2022-03-14 | End: 2022-03-14

## 2022-03-14 RX ORDER — MIDODRINE HYDROCHLORIDE 5 MG/1
10 TABLET ORAL
Status: DISCONTINUED | OUTPATIENT
Start: 2022-03-14 | End: 2022-03-18 | Stop reason: HOSPADM

## 2022-03-14 RX ORDER — SODIUM CHLORIDE 9 MG/ML
125 INJECTION, SOLUTION INTRAVENOUS CONTINUOUS
Status: DISCONTINUED | OUTPATIENT
Start: 2022-03-14 | End: 2022-03-16

## 2022-03-14 RX ORDER — DIPHENOXYLATE HYDROCHLORIDE AND ATROPINE SULFATE 2.5; .025 MG/1; MG/1
1 TABLET ORAL DAILY
Status: DISCONTINUED | OUTPATIENT
Start: 2022-03-14 | End: 2022-03-18 | Stop reason: HOSPADM

## 2022-03-14 RX ADMIN — CARVEDILOL 3.12 MG: 3.12 TABLET, FILM COATED ORAL at 08:26

## 2022-03-14 RX ADMIN — IPRATROPIUM BROMIDE AND ALBUTEROL SULFATE 3 ML: .5; 3 SOLUTION RESPIRATORY (INHALATION) at 19:49

## 2022-03-14 RX ADMIN — IPRATROPIUM BROMIDE AND ALBUTEROL SULFATE 3 ML: .5; 3 SOLUTION RESPIRATORY (INHALATION) at 14:50

## 2022-03-14 RX ADMIN — CARVEDILOL 3.12 MG: 3.12 TABLET, FILM COATED ORAL at 17:24

## 2022-03-14 RX ADMIN — THERA TABS 1 TABLET: TAB at 13:54

## 2022-03-14 RX ADMIN — NYSTATIN 1 APPLICATION: 100000 POWDER TOPICAL at 06:07

## 2022-03-14 RX ADMIN — MIDODRINE HYDROCHLORIDE 10 MG: 5 TABLET ORAL at 12:08

## 2022-03-14 RX ADMIN — MIDODRINE HYDROCHLORIDE 10 MG: 5 TABLET ORAL at 17:23

## 2022-03-14 RX ADMIN — LORAZEPAM 0.5 MG: 2 INJECTION INTRAMUSCULAR; INTRAVENOUS at 00:15

## 2022-03-14 RX ADMIN — MIDODRINE HYDROCHLORIDE 10 MG: 5 TABLET ORAL at 08:26

## 2022-03-14 RX ADMIN — HYDROCORTISONE 1 APPLICATION: 1 OINTMENT TOPICAL at 22:13

## 2022-03-14 RX ADMIN — LORAZEPAM 0.5 MG: 2 INJECTION INTRAMUSCULAR; INTRAVENOUS at 13:54

## 2022-03-14 RX ADMIN — CEFTRIAXONE 1 G: 1 INJECTION, POWDER, FOR SOLUTION INTRAMUSCULAR; INTRAVENOUS at 06:07

## 2022-03-14 RX ADMIN — Medication 10 ML: at 22:12

## 2022-03-14 RX ADMIN — TRAZODONE HYDROCHLORIDE 25 MG: 50 TABLET ORAL at 22:12

## 2022-03-14 RX ADMIN — LORAZEPAM 0.5 MG: 2 INJECTION INTRAMUSCULAR; INTRAVENOUS at 22:12

## 2022-03-14 RX ADMIN — SODIUM CHLORIDE 125 ML/HR: 9 INJECTION, SOLUTION INTRAVENOUS at 18:53

## 2022-03-14 RX ADMIN — Medication 10 ML: at 08:27

## 2022-03-14 RX ADMIN — PANTOPRAZOLE SODIUM 40 MG: 40 TABLET, DELAYED RELEASE ORAL at 06:07

## 2022-03-14 RX ADMIN — HYDROCORTISONE 1 APPLICATION: 1 OINTMENT TOPICAL at 17:23

## 2022-03-14 RX ADMIN — CALCIUM ACETATE MONOHYDRATE AND ALUMINUM SULFATE TETRADECAHYDRATE 500 ML: 952; 1347 POWDER, FOR SOLUTION TOPICAL at 22:13

## 2022-03-14 RX ADMIN — Medication 100 MG: at 12:08

## 2022-03-14 RX ADMIN — ENOXAPARIN SODIUM 40 MG: 40 INJECTION SUBCUTANEOUS at 17:23

## 2022-03-14 RX ADMIN — IPRATROPIUM BROMIDE AND ALBUTEROL SULFATE 3 ML: .5; 3 SOLUTION RESPIRATORY (INHALATION) at 11:19

## 2022-03-14 RX ADMIN — CALCIUM ACETATE MONOHYDRATE AND ALUMINUM SULFATE TETRADECAHYDRATE 500 ML: 952; 1347 POWDER, FOR SOLUTION TOPICAL at 17:23

## 2022-03-14 NOTE — NURSING NOTE
Dr Hassan rounded on patients.  Informed him that patients Hgb 9.4 down from 11.1.  No new orders at this time.

## 2022-03-14 NOTE — PROGRESS NOTES
Wound Initial Evaluation  GRAHAM NICHOLS     Patient Name: Shanelle Solis  : 1955  MRN: [id number]  Today's Date: 3/14/2022 Room Number: 2117/1      Admit Date: 3/13/2022  Attending: Eloy Duke,*    Consult Requested By: Dr Duke    Reason For Consult: MAD and sacral PI    Chief Complaint: 67-year-old female with a history of alcohol abuse, cirrhosis, asthma COPD, GERD, breast cancer, cervical cancer, and tobacco dependence presented to the ED at [facility] following her caregiver checking on her and finding her lobe her blood pressure low.  Patient was also noted to be confused and short of breath.  Patient is awake and alert she is a rather poor historian patient does complain of itching but is not able to provide me any type of a history as to her skin issues how long has been present or any prior treatments.    Visit Dx:  No diagnosis found.  Patient Active Problem List   Diagnosis   • CHF exacerbation (HCC)   • Hyponatremia with excess extracellular fluid volume   • Elevated liver enzymes   • Thrombocytopenia (HCC)   • Lactic acidosis   • SIRS (systemic inflammatory response syndrome) (HCC)   • Skin excoriation   • Dermatitis associated with moisture   • Unstageable pressure ulcer of sacral region (HCC)       History:   Past Medical History:   Diagnosis Date   • Alcohol abuse    • Alcoholic cirrhosis (HCC)    • Asthma    • Cataract    • Cellulitis of leg    • Chronic gastritis    • COPD (chronic obstructive pulmonary disease) (HCC)    • GERD (gastroesophageal reflux disease)    • History of cervical cancer     Treated with conization   • History of right breast cancer     Treated with chemotherapy     Past Surgical History:   Procedure Laterality Date   • COLONOSCOPY     • FOOT SURGERY       Social History     Socioeconomic History   • Marital status: Single   Tobacco Use   • Smoking status: Current Every Day Smoker     Packs/day: 0.25     Types: Cigarettes     Start date:  3/13/2022   • Smokeless tobacco: Current User   Vaping Use   • Vaping Use: Never used   Substance and Sexual Activity   • Alcohol use: Yes     Comment: Daily alcohol abuse   • Drug use: Never   • Sexual activity: Defer       Allergies:  No Known Allergies    Medications:    Current Facility-Administered Medications:   •  albuterol (PROVENTIL) nebulizer solution 0.083% 2.5 mg/3mL, 2.5 mg, Nebulization, Q6H PRN, Nilda Higgins MD  •  aluminum sulfate-calcium acetate 1:20 ([place]OME[place]O) topical astringent solution 500 mL, 500 mL, Topical, Q8H, Hanna Hernandez APRN  •  carvedilol (COREG) tablet 3.125 mg, 3.125 mg, Oral, BID With Meals, Nilda Higgins MD, 3.125 mg at 22 0826  •  cefTRIAXone (ROCEPHIN) 1 g in sodium chloride 0.9 % 100 mL IVPB, 1 g, Intravenous, Q24H, Nilda Higgins MD, Last Rate: 200 mL/hr at 22 0607, 1 g at 22 0607  •  enoxaparin (LOVENOX) syringe 40 mg, 40 mg, Subcutaneous, Q24H, Nilda Higgins MD, 40 mg at 22 1615  •  hydrocortisone-bacitracin-zinc oxide-nystatin (MAGIC BARRIER) ointment 1 application, 1 application, Topical, TID, Hanna Hernandez APRN  •  ipratropium-albuterol (DUO-NEB) nebulizer solution 3 mL, 3 mL, Nebulization, 4x Daily - RT, Nilda Higgins MD, 3 mL at 22  •  [] LORazepam (ATIVAN) injection 0.5 mg, 0.5 mg, Intravenous, Q6H, 0.5 mg at 22 0015 **FOLLOWED BY** LORazepam (ATIVAN) injection 0.5 mg, 0.5 mg, Intravenous, Q8H, Nilda Higgins MD  •  LORazepam (ATIVAN) tablet 0.5 mg, 0.5 mg, Oral, Q2H PRN **OR** LORazepam (ATIVAN) injection 0.5 mg, 0.5 mg, Intravenous, Q2H PRN **OR** LORazepam (ATIVAN) tablet 1 mg, 1 mg, Oral, Q1H PRN **OR** LORazepam (ATIVAN) injection 1 mg, 1 mg, Intravenous, Q1H PRN **OR** LORazepam (ATIVAN) injection 1 mg, 1 mg, Intravenous, Q15 Min PRN **OR** LORazepam (ATIVAN) injection 1 mg, 1 mg, Intramuscular, Q15 Min PRN, Nilda Higgins MD  •  midodrine (PROAMATINE) tablet 10 mg, 10 mg, Oral, TID AC, Nilda Higgins MD, 10 mg at 22  0826  •  nystatin (MYCOSTATIN) powder, , Topical, Q12H, Nilda Higgins MD, 1 application at 03/14/22 0607  •  pantoprazole (PROTONIX) EC tablet 40 mg, 40 mg, Oral, Q AM, Nilda Higgins MD, 40 mg at 03/14/22 0607  •  sodium chloride 0.9 % flush 10 mL, 10 mL, Intravenous, Q12H, Nilda Higgins MD, 10 mL at 03/14/22 0827  •  sodium chloride 0.9 % flush 10 mL, 10 mL, Intravenous, PRN, Nilda Higgins MD  •  traZODone (DESYREL) tablet 25 mg, 25 mg, Oral, Nightly, Eloy Duke DO, 25 mg at 03/13/22 2046    Results Review:  Lab Results (last 48 hours)     Procedure Component Value Units Date/Time    Sodium, Urine, Random - Urine, Catheter [[id number]] Collected: 03/14/22 0719    Specimen: Urine, Catheter Updated: 03/14/22 0801     Sodium, Urine <20 mmol/L     Narrative:      Reference intervals for random urine have not been established.  Clinical usage is dependent upon physician's interpretation in combination with other laboratory tests.       Osmolality, Urine - Urine, Catheter [[id number]]  (Normal) Collected: 03/14/22 0719    Specimen: Urine, Catheter Updated: 03/14/22 0743     Osmolality, Urine 348 mOsm/kg     Blood Culture - Blood, Arm, Right [[id number]]  (Normal) Collected: 03/13/22 0631    Specimen: Blood from Arm, Right Updated: 03/14/22 0647     Blood Culture No growth at 24 hours    Blood Culture - Blood, Arm, Left [[id number]]  (Normal) Collected: 03/13/22 0635    Specimen: Blood from Arm, Left Updated: 03/14/22 0647     Blood Culture No growth at 24 hours    Renal Function Panel [[id number]]  (Abnormal) Collected: 03/14/22 0521    Specimen: Blood Updated: 03/14/22 0600     Glucose 101 mg/dL      BUN 11 mg/dL      Creatinine 0.51 mg/dL      Sodium 122 mmol/L      Potassium 4.7 mmol/L      Chloride 83 mmol/L      CO2 39.0 mmol/L      Calcium 8.3 mg/dL      Albumin 2.00 g/dL      Phosphorus 2.7 mg/dL      Anion Gap 0.0 mmol/L      BUN/Creatinine Ratio 21.6     eGFR 103.7 mL/min/1.73      Comment: National Kidney  Foundation and American Society of Nephrology (ASN) Task Force recommended calculation based on the Chronic Kidney Disease Epidemiology Collaboration (CKD-EPI) equation refit without adjustment for race.       Narrative:      GFR Normal >60  Chronic Kidney Disease <60  Kidney Failure <15      CK [[id number]]  (Normal) Collected: 03/14/22 0521    Specimen: Blood Updated: 03/14/22 0600     Creatine Kinase 42 U/L     Uric Acid [[id number]]  (Abnormal) Collected: 03/14/22 0521    Specimen: Blood Updated: 03/14/22 0600     Uric Acid 6.3 mg/dL     Ammonia [[id number]]  (Normal) Collected: 03/14/22 0521    Specimen: Blood Updated: 03/14/22 0550     Ammonia 20 umol/L     CBC & Differential [[id number]]  (Abnormal) Collected: 03/14/22 0521    Specimen: Blood Updated: 03/14/22 0530    Narrative:      The following orders were created for panel order CBC & Differential.  Procedure                               Abnormality         Status                     ---------                               -----------         ------                     CBC Auto Differential[[id number]]        Abnormal            Final result                 Please view results for these tests on the individual orders.    CBC Auto Differential [[id number]]  (Abnormal) Collected: 03/14/22 0521    Specimen: Blood Updated: 03/14/22 0530     WBC 5.30 10*3/mm3      RBC 2.77 10*6/mm3      Hemoglobin 9.4 g/dL      Hematocrit 27.5 %      MCV 99.3 fL      MCH 33.8 pg      MCHC 34.0 g/dL      RDW 17.9 %      RDW-SD 61.3 fl      MPV 8.0 fL      Platelets 77 10*3/mm3      Neutrophil % 78.8 %      Lymphocyte % 12.1 %      Monocyte % 5.8 %      Eosinophil % 3.0 %      Basophil % 0.3 %      Neutrophils, Absolute 4.20 10*3/mm3      Lymphocytes, Absolute 0.60 10*3/mm3      Monocytes, Absolute 0.30 10*3/mm3      Eosinophils, Absolute 0.20 10*3/mm3      Basophils, Absolute 0.00 10*3/mm3      nRBC 0.1 /100 WBC     Renal Function Panel [[id number]]  (Abnormal) Collected: 03/13/22  2146    Specimen: Blood Updated: 03/13/22 2217     Glucose 103 mg/dL      BUN 10 mg/dL      Creatinine 0.61 mg/dL      Sodium 120 mmol/L      Potassium 4.8 mmol/L      Comment: Slight hemolysis detected by analyzer. Results may be affected.        Chloride 80 mmol/L      CO2 35.0 mmol/L      Calcium 8.6 mg/dL      Albumin 2.10 g/dL      Phosphorus 3.2 mg/dL      Anion Gap 5.0 mmol/L      BUN/Creatinine Ratio 16.4     eGFR 99.4 mL/min/1.73      Comment: National Kidney Foundation and American Society of Nephrology (ASN) Task Force recommended calculation based on the Chronic Kidney Disease Epidemiology Collaboration (CKD-EPI) equation refit without adjustment for race.       Narrative:      GFR Normal >60  Chronic Kidney Disease <60  Kidney Failure <15      Gamma GT [[id number]]  (Abnormal) Collected: 03/13/22 0631    Specimen: Blood Updated: 03/13/22 1755     GGT 89 U/L     Renal Function Panel [[id number]]  (Abnormal) Collected: 03/13/22 1702    Specimen: Blood Updated: 03/13/22 1734     Glucose 140 mg/dL      BUN 11 mg/dL      Creatinine 0.61 mg/dL      Sodium 119 mmol/L      Potassium 5.1 mmol/L      Comment: Slight hemolysis detected by analyzer. Results may be affected.        Chloride 80 mmol/L      CO2 36.0 mmol/L      Calcium 9.0 mg/dL      Albumin 2.10 g/dL      Phosphorus 3.6 mg/dL      Anion Gap 3.0 mmol/L      BUN/Creatinine Ratio 18.0     eGFR 99.4 mL/min/1.73      Comment: National Kidney Foundation and American Society of Nephrology (ASN) Task Force recommended calculation based on the Chronic Kidney Disease Epidemiology Collaboration (CKD-EPI) equation refit without adjustment for race.       Narrative:      GFR Normal >60  Chronic Kidney Disease <60  Kidney Failure <15      Potassium [[id number]]  (Normal) Collected: 03/13/22 1702    Specimen: Blood Updated: 03/13/22 1732     Potassium 5.1 mmol/L      Comment: Slight hemolysis detected by analyzer. Results may be affected.       STAT Lactic Acid,  Reflex [[id number]]  (Normal) Collected: 03/13/22 1013    Specimen: Blood Updated: 03/13/22 1035     Lactate 1.9 mmol/L     Osmolality, Urine - Urine, Clean Catch [[id number]]  (Normal) Collected: 03/13/22 0831    Specimen: Urine, Clean Catch Updated: 03/13/22 0940     Osmolality, Urine 417 mOsm/kg     Sodium, Urine, Random - Urine, Clean Catch [[id number]] Collected: 03/13/22 0831    Specimen: Urine, Clean Catch Updated: 03/13/22 0909     Sodium, Urine <20 mmol/L     Narrative:      Reference intervals for random urine have not been established.  Clinical usage is dependent upon physician's interpretation in combination with other laboratory tests.       Comprehensive Metabolic Panel [[id number]]  (Abnormal) Collected: 03/13/22 0631    Specimen: Blood Updated: 03/13/22 0717     Glucose 115 mg/dL      BUN 10 mg/dL      Creatinine 0.62 mg/dL      Sodium 116 mmol/L      Potassium 6.1 mmol/L      Chloride 77 mmol/L      CO2 35.0 mmol/L      Calcium 8.6 mg/dL      Total Protein 5.5 g/dL      Albumin 2.40 g/dL      ALT (SGPT) 64 U/L      AST (SGOT) 58 U/L      Alkaline Phosphatase 137 U/L      Total Bilirubin 2.9 mg/dL      Globulin 3.1 gm/dL      A/G Ratio 0.8 g/dL      BUN/Creatinine Ratio 16.1     Anion Gap 4.0 mmol/L      eGFR 99.0 mL/min/1.73      Comment: National Kidney Foundation and American Society of Nephrology (ASN) Task Force recommended calculation based on the Chronic Kidney Disease Epidemiology Collaboration (CKD-EPI) equation refit without adjustment for race.       Narrative:      GFR Normal >60  Chronic Kidney Disease <60  Kidney Failure <15      Lactic Acid, Plasma [[id number]]  (Abnormal) Collected: 03/13/22 0631    Specimen: Blood Updated: 03/13/22 0717     Lactate 2.1 mmol/L     BNP [[id number]]  (Abnormal) Collected: 03/13/22 0631    Specimen: Blood Updated: 03/13/22 0713     proBNP 7,785.0 pg/mL     Narrative:      Among patients with dyspnea, NT-proBNP is highly sensitive for the detection of acute  congestive heart failure. In addition NT-proBNP of <300 pg/ml effectively rules out acute congestive heart failure with 99% negative predictive value.    Results may be falsely decreased if patient taking Biotin.      TSH [[id number]]  (Normal) Collected: 03/13/22 0631    Specimen: Blood Updated: 03/13/22 0713     TSH 3.200 uIU/mL     Troponin [[id number]]  (Normal) Collected: 03/13/22 0631    Specimen: Blood Updated: 03/13/22 0713     Troponin T <0.010 ng/mL     Narrative:      Troponin T Reference Range:  <= 0.03 ng/mL-   Negative for AMI  >0.03 ng/mL-     Abnormal for myocardial necrosis.  Clinicians would have to utilize clinical acumen, EKG, Troponin and serial changes to determine if it is an Acute Myocardial Infarction or myocardial injury due to an underlying chronic condition.       Results may be falsely decreased if patient taking Biotin.      Phosphorus [[id number]]  (Normal) Collected: 03/13/22 0631    Specimen: Blood Updated: 03/13/22 0712     Phosphorus 3.7 mg/dL     Lipid Panel [[id number]] Collected: 03/13/22 0631    Specimen: Blood Updated: 03/13/22 0712     Total Cholesterol 68 mg/dL      Triglycerides 63 mg/dL      HDL Cholesterol 50 mg/dL      LDL Cholesterol  <5 mg/dL      VLDL Cholesterol --     Comment: Unable to calculate        LDL/HDL Ratio 0.11    Narrative:      Cholesterol Reference Ranges  (U.S. Department of Health and Human Services ATP III Classifications)    Desirable          <200 mg/dL  Borderline High    200-239 mg/dL  High Risk          >240 mg/dL      Triglyceride Reference Ranges  (U.S. Department of Health and Human Services ATP III Classifications)    Normal           <150 mg/dL  Borderline High  150-199 mg/dL  High             200-499 mg/dL  Very High        >500 mg/dL    HDL Reference Ranges  (U.S. Department of Health and Human Services ATP III Classifications)    Low     <40 mg/dl (major risk factor for CHD)  High    >60 mg/dl ('negative' risk factor for CHD)        LDL  Reference Ranges  (U.S. Department of Health and Human Services ATP III Classifications)    Optimal          <100 mg/dL  Near Optimal     100-129 mg/dL  Borderline High  130-159 mg/dL  High             160-189 mg/dL  Very High        >189 mg/dL    Magnesium [[id number]]  (Normal) Collected: 03/13/22 0631    Specimen: Blood Updated: 03/13/22 0712     Magnesium 1.6 mg/dL     Osmolality, Serum [[id number]]  (Abnormal) Collected: 03/13/22 0631    Specimen: Blood Updated: 03/13/22 0712     Osmolality 250 mOsm/kg     Protime-INR [[id number]]  (Abnormal) Collected: 03/13/22 0631    Specimen: Blood Updated: 03/13/22 0700     Protime 13.2 Seconds      INR 1.21    D-dimer, Quantitative [[id number]]  (Normal) Collected: 03/13/22 0631    Specimen: Blood Updated: 03/13/22 0700     D-Dimer, Quantitative 0.53 mg/L (FEU)     Narrative:      Reference Range  --------------------------------------------------------------------     < 0.50   Negative Predictive Value  0.50-0.59   Indeterminate    >= 0.60   Probable VTE             A very low percentage of patients with DVT may yield D-Dimer results   below the cut-off of 0.50 mg/L FEU.  This is known to be more   prevalent in patients with distal DVT.             Results of this test should always be interpreted in conjunction with   the patient's medical history, clinical presentation and other   findings.  Clinical diagnosis should not be based on the result of   [place] D-Dimer alone.    CBC & Differential [[id number]]  (Abnormal) Collected: 03/13/22 0631    Specimen: Blood Updated: 03/13/22 0644    Narrative:      The following orders were created for panel order CBC & Differential.  Procedure                               Abnormality         Status                     ---------                               -----------         ------                     CBC Auto Differential[[id number]]        Abnormal            Final result                 Please view results for these tests on the  individual orders.    CBC Auto Differential [[id number]]  (Abnormal) Collected: 03/13/22 0631    Specimen: Blood Updated: 03/13/22 0644     WBC 10.20 10*3/mm3      RBC 3.42 10*6/mm3      Hemoglobin 11.1 g/dL      Hematocrit 33.1 %      MCV 96.8 fL      MCH 32.4 pg      MCHC 33.5 g/dL      RDW 17.6 %      RDW-SD 59.1 fl      MPV 8.0 fL      Platelets 114 10*3/mm3      Neutrophil % 90.1 %      Lymphocyte % 5.6 %      Monocyte % 3.8 %      Eosinophil % 0.2 %      Basophil % 0.3 %      Neutrophils, Absolute 9.20 10*3/mm3      Lymphocytes, Absolute 0.60 10*3/mm3      Monocytes, Absolute 0.40 10*3/mm3      Eosinophils, Absolute 0.00 10*3/mm3      Basophils, Absolute 0.00 10*3/mm3      nRBC 0.1 /100 WBC     Blood Gas, Arterial - [[id number]]  (Abnormal) Collected: 03/13/22 0459    Specimen: Arterial Blood Updated: 03/13/22 0503     Site Right Brachial     Garcia's Test Positive     pH, Arterial 7.391 pH units      pCO2, Arterial 60.5 mm Hg      pO2, Arterial 51.5 mm Hg      HCO3, Arterial 36.8 mmol/L      Base Excess, Arterial 9.8 mmol/L      Comment: Serial Number: [id number]:  [id number]        O2 Saturation, Arterial 84.4 %      CO2 Content 38.6 mmol/L      Barometric Pressure for Blood Gas --     Comment: N/A        Modality Room Air     FIO2 21 %      Hemodilution No        Imaging Results (Last 72 Hours)     Procedure Component Value Units Date/Time    XR Chest 1 View [[id number]] Collected: 03/13/22 1000     Updated: 03/13/22 1004    Narrative:         DATE OF EXAM:   3/13/2022 6:16 AM     PROCEDURE:   XR CHEST 1 VW-     INDICATIONS:   COPD and CHF     COMPARISON:  No Comparisons Available     TECHNIQUE:   Portable Chest     FINDINGS:      Study limited by overlying support and monitoring apparatus     Lungs are hyperexpanded with advanced emphysematous changes noted. This  is somewhat asymmetric on the left. Scarring noted at the left apex. The  heart size is within normal limits. There is evidence of old  granulomatous  disease. No definite pneumothorax noted. Scarring is noted  at the right apex. Osseous structures demonstrate no acute abnormality        Impression:      IMPRESSION :   Hyperexpansion of the lungs compatible with severe COPD, emphysema.     Patchy opacity at the left apex may represent scarring although  pneumonia cannot be excluded.     Electronically Signed By-Jeanmarie López On:3/13/2022 10:02 AM  This report was finalized on [id number] by  Jeanmarie López, .          Review of Systems:  Review of Systems   Constitutional: Negative for chills and fever.   HENT: Negative.    Respiratory: Negative for cough, choking and shortness of breath.    Cardiovascular: Negative for chest pain and leg swelling.   Gastrointestinal: Negative for abdominal pain, constipation, diarrhea and nausea.   Genitourinary:        Incontinence   Musculoskeletal: Positive for gait problem.   Skin: Positive for rash and wound.   Psychiatric/Behavioral: Positive for confusion.       Physical Assessment:                                                                                                                                                                                               Unstageable sacral pressure injuries: Patient has several areas low sacrum which are covered in a white eschar surrounding skin is quite excoriated and presents with a red papular rash consistent with yeast.  The areas although there are several are all small really no exudate is noted no warmth no induration new approximate size 0.8 x 0.8 cm 1 x 1 cm and 2 x 1 cm and 0.8 x 0.6 cm.    Moisture associated dermatitis: Patient has a red papular rash extends up well into the abdomen groin thighs buttocks perineum multiple denuded areas are noted at this time particularly to the right inner thigh and left inner thigh as well as to the perineal area as well.    Additionally there is an area to the lower extremity which is dry hard and scabbed the area is  stable there is no erythema or warmth from it    Final diagnosis  Moisture associated dermatitis  Unstageable sacral pressure injury    Recommendation and Plan  Would recommend pressure injury prevention strategies to be implemented patient will need a chair cushion when up in chair the bed also will place patient on an agility offloading surface.  Will recommend treating topically will do [place] soaks to the open denuded areas and treat the entire areas with Maureen's Magic Butt cream    NAVNEET Baca   3/14/2022   10:29 EDT

## 2022-03-14 NOTE — PROGRESS NOTES
"[facility] PROGRESS NOTE     LOS: 1 day    Patient Care Team:  Provider, No Known as PCP - General      Subjective     Patient was seen and examined this morning.  Patient was feeling very tired and has cough with minimal expectoration.  Denies any chest pain, nausea or vomiting    Objective     Vital Sign Min/Max for last 24 hours  Temp:  [95 °F (35 °C)-98 °F (36.7 °C)] 95 °F (35 °C)  Heart Rate:  [] 78  Resp:  [16-19] 16  BP: ()/(39-61) 92/47                       Flowsheet Rows    Flowsheet Row First Filed Value   Admission Height 165.1 cm (65\") Documented at 03/13/2022 0142   Admission Weight 53.2 kg (117 lb 4.6 oz) Documented at 03/13/2022 0142          I/O this shift:  In: 150 [P.O.:150]  Out: -   I/O last 3 completed shifts:  In: 200 [P.O.:100; IV Piggyback:100]  Out: 200 [Urine:200]    Physical Exam:  Physical Exam    General Appearance: Chronically ill-appearing  Skin: warm and dry  HEENT: oral mucosa dry, nonicteric sclera  Neck: supple, no JVD  Lungs: Bilateral rhonchi   heart: RRR, normal S1 and S2  Abdomen: soft, nontender, nondistended  : no palpable bladder  Extremities: no edema, cyanosis or clubbing         LABS:  Lab Results   Component Value Date    CALCIUM 8.5 (L) 03/14/2022    PHOS 2.7 03/14/2022     Results from last 7 days   Lab Units 03/14/22  1236 03/14/22  0521 03/13/22  2146 03/13/22  1702 03/13/22  0631   MAGNESIUM mg/dL 1.6  --   --   --  1.6   SODIUM mmol/L 122* 122* 120*   < > 116*   POTASSIUM mmol/L 4.7 4.7 4.8   < > 6.1*   CHLORIDE mmol/L 84* 83* 80*   < > 77*   CO2 mmol/L 36.0* 39.0* 35.0*   < > 35.0*   BUN mg/dL 11 11 10   < > 10   CREATININE mg/dL 0.59 0.51* 0.61   < > 0.62   GLUCOSE mg/dL 127* 101* 103*   < > 115*   CALCIUM mg/dL 8.5* 8.3* 8.6   < > 8.6   WBC 10*3/mm3 5.80 5.30  --   --  10.20   HEMOGLOBIN g/dL 9.9* 9.4*  --   --  11.1*   PLATELETS 10*3/mm3 84* 77*  --   --  114*   ALT (SGPT) U/L  --   --   --   --  64*   AST (SGOT) U/L  --   --   --   --  58* "    < > = values in this interval not displayed.     Lab Results   Component Value Date    CKTOTAL 42 03/14/2022    TROPONINT <0.010 03/13/2022     Estimated Creatinine Clearance: 58.8 mL/min (by C-G formula based on SCr of 0.59 mg/dL).  Results from last 7 days   Lab Units 03/13/22  0459   PH, ARTERIAL pH units 7.391   PO2 ART mm Hg 51.5*   PCO2, ARTERIAL mm Hg 60.5*   HCO3 ART mmol/L 36.8*     Brief Urine Lab Results     None        WEIGHTS:     Wt Readings from Last 1 Encounters:   03/13/22 0722 53.1 kg (117 lb)   03/13/22 0142 53.2 kg (117 lb 4.6 oz)       aluminum sulfate-calcium acetate 1:20, 500 mL, Topical, Q8H  carvedilol, 3.125 mg, Oral, BID With Meals  [START ON 3/15/2022] cefTRIAXone Sodium-Dextrose, 1 g, Intravenous, Q24H  enoxaparin, 40 mg, Subcutaneous, Q24H  hydrocortisone-bacitracin-zinc oxide-nystatin, 1 application, Topical, TID  ipratropium-albuterol, 3 mL, Nebulization, 4x Daily - RT  LORazepam, 0.5 mg, Intravenous, Q8H  midodrine, 10 mg, Oral, TID AC  multivitamin, 1 tablet, Oral, Daily  nystatin, , Topical, Q12H  pantoprazole, 40 mg, Oral, Q AM  sodium chloride, 10 mL, Intravenous, Q12H  [START ON 3/15/2022] thiamine, 100 mg, Oral, Daily  traZODone, 25 mg, Oral, Nightly           Assessment/Plan       1.  Hyponatremia  Seems to be hypovolemic hyponatremia  Sodium improving but very slowly.   2.  Hypotension  3.  CHF.  Diastolic.  Chronic  4.  Alcoholic cirrhosis    Recs:  Resume IV hydration with normal saline  Discontinue trazodone  Continue oral midodrine      Salazar Santos MD  03/14/22  14:20 EDT

## 2022-03-14 NOTE — NURSING NOTE
Spoke to Aidee regarding patients condition and an update on treatment.  Daughter, Aidee is also her caregiver/POA advised nursing staff that mother is an alcoholic.  Assured her we are aware of situation.

## 2022-03-14 NOTE — NURSING NOTE
Called Dr. Higgins to report b/p now 114/61 with MAP of 73; orders to continue midodrine, d/c IVFs, and d/c IV levophed

## 2022-03-14 NOTE — PROGRESS NOTES
"Heart Failure Program  Nurse Navigator  Discharge Planning    Patient Name:Shanelle Solis  :1955  Cardiologist:Uzair  Current Admission Date: 3/13/2022   Previous Admission:    Admission frequency: 1 admissions in 6 months    Heart Failure history per record:    Symptoms on admission:Cough, SOA, swelling lower legs. Transfer from [facility]. Pt advises she lives independently with caregivers. Pt reports daily ETOH, and of medication adherence, however inconsistent with information.       Admission Weight:  Flowsheet Rows    Flowsheet Row First Filed Value   Admission Height 165.1 cm (65\") Documented at 2022   Admission Weight 53.2 kg (117 lb 4.6 oz) Documented at 2022            Current Home Medications:  Prior to Admission medications    Medication Sig Start Date End Date Taking? Authorizing Provider   cephalexin (KEFLEX) 500 MG capsule Take 500 mg by mouth 2 (Two) Times a Day. 3/9/22 3/15/22 Yes Gus Gary MD   cholecalciferol (VITAMIN D3) 25 MCG (1000 UT) tablet Take 1,000 Units by mouth Daily.   Yes Gus Gary MD   furosemide (LASIX) 40 MG tablet Take 40 mg by mouth Daily.   Yes Gus Gary MD   guaiFENesin (MUCINEX) 600 MG 12 hr tablet Take 600 mg by mouth 2 (Two) Times a Day.   Yes Gus Gary MD   magnesium oxide (MAGOX) 400 (241.3 Mg) MG tablet tablet Take 400 mg by mouth 2 (Two) Times a Day.   Yes Gus Gary MD   metoprolol tartrate (LOPRESSOR) 25 MG tablet Take 12.5 mg by mouth 2 (Two) Times a Day.   Yes Gus Gary MD   potassium chloride (K-DUR,KLOR-CON) 10 MEQ CR tablet Take  by mouth.   Yes Gus Gary MD   traZODone (DESYREL) 50 MG tablet Take 25 mg by mouth Every Night.   Yes Gus Gary MD       Social history:   Pt lives independently with caregivers, daughter is possibly one of her caregivers. Pt describes medication adherence, however this changes with story. Pt reports drinking ETOH daily, " had not had an appetite. Reports not eating but more than a few bites a day.     Smoking status:     Diagnostics Testing:  proBNP level: 7785    Echocardiogram:Results for orders placed during the hospital encounter of 03/13/22    Adult Transthoracic Echo Complete With Contrast if Necessary Per Protocol    Interpretation Summary  · Estimated left ventricular EF was in agreement with the calculated left ventricular EF. Left ventricular ejection fraction appears to be 61 - 65%. Left ventricular systolic function is normal.  · The right atrial cavity is moderately dilated.  · The study is technically difficult for diagnosis.  · The quality of the study is limited due to Subcostal windows only for cardiac imaging..        Patient Assessment:   Pt lying in bed, resp even and unlabored no SOA with conversation, noted trace edema ankles,    Current O2: 3L NC  Home O2: 3L NC    Education provided to patient:  yes- Heart Failure disease education  yes -Symptom identification/management  pending -Daily Weights  pending- Diet education  pending- Fluid restriction (if ordered)  pending- Activity education  yes- Medication education  na- Smoking cessation  yes- Follow-up Appointments  yes-Provided information on how to access [facility] My HF Guide/Heart Failure Interactive workbook  Pt does zaria [facility], Maurizio Redd  Acceptance of learning: acceptance, needs repeated    Heart Failure education interactive teaching session time: 30 minutes    GWTG: EF >40%    Identified needs/barriers:   New dx, evidence of chronic disease malnutrition - pending consult dietitian. ?medication adherence, ?palliative goals of care consult.    Intervention:   CM, RN

## 2022-03-14 NOTE — CONSULTS
Patient does not qualify for Cardiac Rehab due to no recent MI, OHS, coronary intervention, or EF less than 35%.

## 2022-03-14 NOTE — CASE MANAGEMENT/SOCIAL WORK
Discharge Planning Assessment   [facility]     Patient Name: Shanelle Solis  MRN: [id number]  Today's Date: 3/14/2022    Admit Date: 3/13/2022     Discharge Needs Assessment     Row Name 03/14/22 1313       Living Environment    People in Home alone  Has caregivers at home    Current Living Arrangements home    Primary Care Provided by homecare agency    Provides Primary Care For no one    Family Caregiver if Needed other (see comments)  Caregivers    Quality of Family Relationships supportive    Able to Return to Prior Arrangements yes       Resource/Environmental Concerns    Resource/Environmental Concerns none       Transition Planning    Patient/Family Anticipates Transition to inpatient rehabilitation facility    Patient/Family Anticipated Services at Transition rehabilitation services    Transportation Anticipated family or friend will provide       Discharge Needs Assessment    Readmission Within the Last 30 Days no previous admission in last 30 days    Equipment Currently Used at Home wheelchair    Concerns to be Addressed care coordination/care conferences;discharge planning    Anticipated Changes Related to Illness none    Equipment Needed After Discharge none    Provided Post Acute Provider List? N/A    Patient's Choice of Community Agency(s) [facility]. [facility]               Discharge Plan     Row Name 03/14/22 1314       Plan    Plan DC Plan: [facility] referral pending acceptance, no precert required, PASRR per facility. From home with caregivers and [facility].    Patient/Family in Agreement with Plan yes    Plan Comments CM discussed with MSW who previously met with patient at bedside. CM also met with patient at bedside to discuss therapy recommendations of inpt rehab. Patient requested [facility]. CM sent new referral in basket and to liaisonRabia. Patient reported to MSW that she sees [facility] for PCP services, has [facility], has a caregivers who are  mother daughter named Obinna, has medications delivered through [facility], is current with [facility], and has a wheelchair at home. See MSW note for further details regarding discussions.              Continued Care and Services - Admitted Since 3/13/2022     Destination     Service Provider Request Status Selected Services Address Phone Fax Patient Preferred    [facility]  Pending - Request Sent N/A [address] [place] [id number] [phone]                  Demographic Summary     Row Name 03/14/22 1312       General Information    Admission Type inpatient    Referral Source admission list    Reason for Consult discharge planning    Preferred Language English       Contact Information    Permission Granted to Share Info With                Functional Status     Row Name 03/14/22 1318       Functional Status    Usual Activity Tolerance fair    Current Activity Tolerance fair       Functional Status, IADL    Medications assistive equipment and person    Meal Preparation assistive equipment and person    Housekeeping assistive equipment and person    Laundry assistive equipment and person    Shopping assistive equipment and person              Met with patient in room wearing PPE: mask/goggles.      Maintained distance greater than six feet and spent less than 15 minutes in the room.      Aurelia Henao RN     Office Phone: [phone]  Office Cell: [phone]

## 2022-03-14 NOTE — PROGRESS NOTES
[facility] Daily Progress Note    Patient Name: Shanelle Solis  : 1955  MRN: [id number]  Primary Care Physician:  Provider, No Known  Date of admission: 3/13/2022      Subjective      Chief Complaint: None      Patient Reports:    3/13/22: Patient poor historian.  Given Ativan for CIWA.  Nephrology and cardiology consulted.    3/14/22: Low BP.  On midodrine.  Afebrile.  Wound care consulted.    Review of Systems   All other systems reviewed and are negative.         Objective      Vitals:   Temp:  [95 °F (35 °C)-98 °F (36.7 °C)] 95 °F (35 °C)  Heart Rate:  [] 78  Resp:  [16-19] 16  BP: ()/(39-61) 92/47  Flow (L/min):  [3] 3    Physical Exam  HENT:      Head: Normocephalic.      Mouth/Throat:      Mouth: Mucous membranes are moist.   Eyes:      General: No scleral icterus.     Extraocular Movements: Extraocular movements intact.      Pupils: Pupils are equal, round, and reactive to light.   Cardiovascular:      Rate and Rhythm: Normal rate.   Pulmonary:      Effort: Pulmonary effort is normal.   Abdominal:      General: Bowel sounds are normal.   Musculoskeletal:      Cervical back: Normal range of motion.      Comments: Erythema of thighs, posterior thigh and lower abdomen   Neurological:      Mental Status: She is alert.             Result Review    Result Review:  I have personally reviewed the results from the time of this admission to 3/14/2022 12:27 EDT and agree with these findings:  [x]  Laboratory  []  Microbiology  [x]  Radiology  []  EKG/Telemetry   []  Cardiology/Vascular   []  Pathology  [x]  Old records  []  Other:      Wounds (last 24 hours)     LDA Wound     Row Name 22 1214 22 0832 22 0400       Wound 22 0506 Left gluteal Pressure Injury    Wound - Properties Group Placement Date: 22  -ER Placement Time: 0506  -ER Side: Left  -ER Location: gluteal  -ER Primary Wound Type: Pressure inj  -ER    Pressure Injury  Stage -- 3  -DB --    Closure Open to air  -DB Open to air  -DB LOU  -NM    Base -- -- dressing in place, unable to visualize  -NM    Periwound non-blanchable;redness;excoriated  -DB non-blanchable;redness;excoriated  -DB --    Periwound Temperature warm  -DB warm  -DB --    Periwound Skin Turgor soft  -DB soft  -DB --    Edges open  -DB open  -DB --    Dressing Care -- -- foam  -NM    Retired Wound - Properties Group Placement Date: 03/13/22  -ER Placement Time: 0506  -ER Side: Left  -ER Location: gluteal  -ER Primary Wound Type: Pressure inj  -ER    Retired Wound - Properties Group Date first assessed: 03/13/22  -ER Time first assessed: 0506  -ER Side: Left  -ER Location: gluteal  -ER Primary Wound Type: Pressure inj  -ER       Wound 03/13/22 0507 Right gluteal Pressure Injury    Wound - Properties Group Placement Date: 03/13/22  -ER Placement Time: 0507  -ER Side: Right  -ER Location: gluteal  -ER Primary Wound Type: Pressure inj  -ER    Pressure Injury Stage -- 3  -DB --    Closure Open to air  -DB Open to air  -DB LOU  -NM    Base moist;non-blanchable  -DB moist;non-blanchable  -DB dressing in place, unable to visualize  -NM    Periwound non-blanchable;redness  -DB non-blanchable;redness  -DB --    Periwound Temperature warm  -DB warm  -DB --    Periwound Skin Turgor soft  -DB soft  -DB --    Dressing Care -- -- foam  -NM    Retired Wound - Properties Group Placement Date: 03/13/22  -ER Placement Time: 0507  -ER Side: Right  -ER Location: gluteal  -ER Primary Wound Type: Pressure inj  -ER    Retired Wound - Properties Group Date first assessed: 03/13/22  -ER Time first assessed: 0507  -ER Side: Right  -ER Location: gluteal  -ER Primary Wound Type: Pressure inj  -ER       Wound 03/13/22 0524 Right proximal arm    Wound - Properties Group Placement Date: 03/13/22  -ER Placement Time: 0524  -ER Side: Right  -ER Orientation: proximal  -ER Location: arm  -ER    Closure -- -- LOU  -NM    Base non-blanchable  -DB  non-blanchable  -DB dressing in place, unable to visualize  -NM    Periwound Temperature warm  -DB warm  -DB --    Periwound Skin Turgor soft  -DB soft  -DB --    Edges open  -DB open  -DB --    Care, Wound -- other (see comments)  nystatin powder  -DB --    Dressing Care -- -- foam  -NM    Retired Wound - Properties Group Placement Date: 03/13/22  -ER Placement Time: 0524  -ER Side: Right  -ER Orientation: proximal  -ER Location: arm  -ER    Retired Wound - Properties Group Date first assessed: 03/13/22  -ER Time first assessed: 0524  -ER Side: Right  -ER Location: arm  -ER       Wound 03/13/22 0528 Left lower leg Abrasion    Wound - Properties Group Placement Date: 03/13/22  -ER Placement Time: 0528  -ER Side: Left  -ER Orientation: lower  -ER Location: leg  -ER Primary Wound Type: Abrasion  -ER    Base scab  -DB scab  -DB scab  -NM    Dressing Care -- -- open to air  -NM    Retired Wound - Properties Group Placement Date: 03/13/22  -ER Placement Time: 0528  -ER Side: Left  -ER Orientation: lower  -ER Location: leg  -ER Primary Wound Type: Abrasion  -ER    Retired Wound - Properties Group Date first assessed: 03/13/22  -ER Time first assessed: 0528  -ER Side: Left  -ER Location: leg  -ER Primary Wound Type: Abrasion  -ER       Rash 03/13/22 0510 groin    Rash - Properties Group Placement Date: 03/13/22  -ER Placement Time: 0510  -ER Location: groin  -ER    Color pink;red  -DB pink;red  -DB pink;red  -NM    Characteristics scaly;pain/discomfort  -DB scaly;pain/discomfort  -DB scaly;pain/discomfort;itching;dry  -NM    Care, Rash -- cleansed with;soap and water  -DB cleansed with;soap and water  antifungal cream applied  -NM    Retired Rash - Properties Group Placement Date: 03/13/22  -ER Placement Time: 0510  -ER Location: groin  -ER    Retired Rash - Properties Group Date first assessed: 03/13/22  -ER Time first assessed: 0510  -ER Location: groin  -ER    Row Name 03/14/22 0000 03/13/22 2000          Wound 03/13/22  0506 Left gluteal Pressure Injury    Wound - Properties Group Placement Date: 03/13/22  -ER Placement Time: 0506  -ER Side: Left  -ER Location: gluteal  -ER Primary Wound Type: Pressure inj  -ER     Closure LOU  -NM LOU  -NM     Base dressing in place, unable to visualize  -NM dressing in place, unable to visualize  -NM     Dressing Care -- foam  -NM     Retired Wound - Properties Group Placement Date: 03/13/22  -ER Placement Time: 0506  -ER Side: Left  -ER Location: gluteal  -ER Primary Wound Type: Pressure inj  -ER     Retired Wound - Properties Group Date first assessed: 03/13/22  -ER Time first assessed: 0506  -ER Side: Left  -ER Location: gluteal  -ER Primary Wound Type: Pressure inj  -ER            Wound 03/13/22 0507 Right gluteal Pressure Injury    Wound - Properties Group Placement Date: 03/13/22  -ER Placement Time: 0507  -ER Side: Right  -ER Location: gluteal  -ER Primary Wound Type: Pressure inj  -ER     Closure LOU  -NM LOU  -NM     Base dressing in place, unable to visualize  -NM dressing in place, unable to visualize  -NM     Dressing Care -- foam  -NM     Retired Wound - Properties Group Placement Date: 03/13/22  -ER Placement Time: 0507  -ER Side: Right  -ER Location: gluteal  -ER Primary Wound Type: Pressure inj  -ER     Retired Wound - Properties Group Date first assessed: 03/13/22  -ER Time first assessed: 0507  -ER Side: Right  -ER Location: gluteal  -ER Primary Wound Type: Pressure inj  -ER            Wound 03/13/22 0524 Right proximal arm    Wound - Properties Group Placement Date: 03/13/22  -ER Placement Time: 0524  -ER Side: Right  -ER Orientation: proximal  -ER Location: arm  -ER     Closure LOU  -NM LOU  -NM     Base dressing in place, unable to visualize  -NM dressing in place, unable to visualize  -NM     Dressing Care -- foam  -NM     Retired Wound - Properties Group Placement Date: 03/13/22  -ER Placement Time: 0524  -ER Side: Right  -ER Orientation: proximal  -ER Location: arm  -ER      Retired Wound - Properties Group Date first assessed: 03/13/22  -ER Time first assessed: 0524  -ER Side: Right  -ER Location: arm  -ER            Wound 03/13/22 0528 Left lower leg Abrasion    Wound - Properties Group Placement Date: 03/13/22  -ER Placement Time: 0528  -ER Side: Left  -ER Orientation: lower  -ER Location: leg  -ER Primary Wound Type: Abrasion  -ER     Base scab  -NM scab  -NM     Dressing Care open to air  -NM open to air  -NM     Retired Wound - Properties Group Placement Date: 03/13/22  -ER Placement Time: 0528  -ER Side: Left  -ER Orientation: lower  -ER Location: leg  -ER Primary Wound Type: Abrasion  -ER     Retired Wound - Properties Group Date first assessed: 03/13/22  -ER Time first assessed: 0528  -ER Side: Left  -ER Location: leg  -ER Primary Wound Type: Abrasion  -ER            Rash 03/13/22 0510 groin    Rash - Properties Group Placement Date: 03/13/22  -ER Placement Time: 0510  -ER Location: groin  -ER     Color red;pink  -NM red;pink  -NM     Characteristics scaly;pain/discomfort;itching;dry  -NM scaly;pain/discomfort;itching;dry  -NM     Care, Rash -- cleansed with;soap and water;other (see comments)  nystatin powder to treat  -NM     Retired Rash - Properties Group Placement Date: 03/13/22  -ER Placement Time: 0510  -ER Location: groin  -ER     Retired Rash - Properties Group Date first assessed: 03/13/22  -ER Time first assessed: 0510  -ER Location: groin  -ER           User Key  (r) = Recorded By, (t) = Taken By, (c) = Cosigned By    Initials Name Provider Type    Graciela Martinez RN Registered Nurse    Abbi Hooks RN Registered Nurse    Eda Cowan LPN Licensed Nurse                  Assessment/Plan      Brief Patient Summary:    67-year-old female with history of alcoholic cirrhosis, breast/cervical cancer and chronic hypoxemic respiratory failure on 3L oxygen presented to [facility] ED for evaluation of weakness.  Patient was found to have low  blood pressure, tachycardia, confusion and was recently started by PCP on Keflex for cellulitis.  Patient found with extensive skin excoriation of thighs, lower abdomen and coccygeal region.  Patient admitted for acute CHF, sepsis due to lower extremity cellulitis and hyponatremia.      aluminum sulfate-calcium acetate 1:20, 500 mL, Topical, Q8H  carvedilol, 3.125 mg, Oral, BID With Meals  cefTRIAXone, 1 g, Intravenous, Q24H  enoxaparin, 40 mg, Subcutaneous, Q24H  hydrocortisone-bacitracin-zinc oxide-nystatin, 1 application, Topical, TID  ipratropium-albuterol, 3 mL, Nebulization, 4x Daily - RT  LORazepam, 0.5 mg, Intravenous, Q8H  midodrine, 10 mg, Oral, TID AC  nystatin, , Topical, Q12H  pantoprazole, 40 mg, Oral, Q AM  sodium chloride, 10 mL, Intravenous, Q12H  traZODone, 25 mg, Oral, Nightly             Active Hospital Problems:  Active Hospital Problems    Diagnosis    • **CHF exacerbation (HCC)    • Dermatitis associated with moisture    • Unstageable pressure ulcer of sacral region (HCC)    • Hyponatremia with excess extracellular fluid volume    • Elevated liver enzymes    • Thrombocytopenia (HCC)    • Lactic acidosis    • SIRS (systemic inflammatory response syndrome) (HCC)    • Skin excoriation      Acute diastolic heart failure:  -s/p TTE 3/13/22--> LVEF 61-65%.  -Cardiology consult    Hypervolemic hyponatremia: Slowly improving  -Nephrology consulted    Hyperkalemia: Resolved  -s/p calcium gluconate, Lokelma    Sepsis due to lower extremity cellulitis:  -Continue Rocephin  -Wound care following    Alcoholic cirrhosis complicated with elevated LFTs, jaundice and thrombocytopenia:  -Check ammonia in the a.m.  -No evidence of bleeding  -Unknown if she follows with GI  -Ammonia within normal limits    Alcohol abuse:  -On CIWA protocol    severe protein calorie malnutrition:  -Encouraged oral intake  -PT/OT following  -Mostly bedbound and has a caretaker at home    COPD/asthma:  -Not in  exacerbation    Chronic hypoxemic respiratory failure:  -On 3L at home    GERD:  -Continue PPI    Tobacco dependence:  -We will offer nicotine patch      DVT prophylaxis:  Medical DVT prophylaxis orders are present.    CODE STATUS:    Medical Intervention Limits: NO intubation (DNI); NO cardioversion; NO antiarrhythmic drugs  Level Of Support Discussed With: Patient  Code Status (Patient has no pulse and is not breathing): No CPR (Do Not Attempt to Resuscitate)  Medical Interventions (Patient has pulse or is breathing): Limited Support      Disposition:  I expect patient to be discharged defer.    This patient has been examined wearing appropriate Personal Protective Equipment and discussed with nursing. 03/14/22      Electronically signed by Eloy Duke DO, 03/14/22, 12:26 EDT.  [facility] Hospitalist Team

## 2022-03-14 NOTE — CONSULTS
Nutrition Services    Patient Name: Shanelle Solis  YOB: 1955  MRN: [id number]  Admission date: 3/13/2022    Comment:  -- Severe chronic disease related malnutrition related to past medical history including heart failure, SIRS as evidenced by fat/muscle loss per physical exam, patient reported PO intakes less than 75% for greater than 1 month.  See MSA below.      -- Butter Pecan Magic Cup q daily (290 kcals, 9 g protein if consumed)     -- Taye BID to provide 90 calories, 7 grams L-Arginine, 7 grams L-Glutamine and 2.5 grams Protein (Collagen)      PPE Documentation        PPE Worn By Provider mask, gloves and eye protection   PPE Worn By Patient  None      CLINICAL NUTRITION ASSESSMENT      Reason for Assessment 3/14: Wound, Consult for Malnutrition Severity Assessment      H&P  67-year-old  female with history of alcohol abuse, cirrhosis, asthma/COPD, GERD, breast cancer treated with radiation, cervical cancer, and tobacco dependence, presented to the ED of [facility] via EMS after her caregiver came yesterday and checked her to find out low blood pressure and tachycardia.      Past Medical History:   Diagnosis Date   • Alcohol abuse    • Alcoholic cirrhosis (HCC)    • Asthma    • Cataract    • Cellulitis of leg    • Chronic gastritis    • COPD (chronic obstructive pulmonary disease) (HCC)    • GERD (gastroesophageal reflux disease)    • History of cervical cancer     Treated with conization   • History of right breast cancer     Treated with chemotherapy       Past Surgical History:   Procedure Laterality Date   • COLONOSCOPY     • FOOT SURGERY          Current Problems   1.  CHF exacerbation.    2.  Hypo osmolar hyponatremia-likely hypervolemic.    3.  Sepsis with lactic acidosis.    4.  Leg cellulitis with extensive skin excoriation and cutaneous candidiasis.    5.  Hx of alcoholic cirrhosis with elevated liver enzymes, jaundice, and thrombocytopenia.    6.  General  "weakness and cachexia.    7.  COPD/asthma     8.  GERD.    9.  Alcohol abuse.  -WA protocol.     10.  Tobacco dependence       Encounter Information        Trending Narrative     3/14: RD visited patient at bedside.  Patient states she just \"eats a little at a time\", noted breakfast tray shows a few bites taken, maybe 25% total.  Patient expresses not liking pineapple, liking peanut butter.  No N/V, no chewing/swallowing issues noted, does not like Boost, reports recent weight gain.       Anthropometrics        Current Height, Weight Height: 165.1 cm (65\")  Weight: 53.1 kg (117 lb) (03/13/22 0722)       Ideal Body Weight (IBW) 130#   Usual Body Weight (UBW) Weight gain from  to current weight per patient        Trending Weight Hx     This admission: 3/14: 117#              PTA: No weight history per chart review     Wt Readings from Last 30 Encounters:   03/13/22 0722 53.1 kg (117 lb)   03/13/22 0142 53.2 kg (117 lb 4.6 oz)      BMI kg/m2 Body mass index is 19.47 kg/m².       Labs        Pertinent Labs    Results from last 7 days   Lab Units 03/14/22 0521 03/13/22 2146 03/13/22 1702 03/13/22  0631   SODIUM mmol/L 122* 120* 119* 116*   POTASSIUM mmol/L 4.7 4.8 5.1  5.1 6.1*   CHLORIDE mmol/L 83* 80* 80* 77*   CO2 mmol/L 39.0* 35.0* 36.0* 35.0*   BUN mg/dL 11 10 11 10   CREATININE mg/dL 0.51* 0.61 0.61 0.62   CALCIUM mg/dL 8.3* 8.6 9.0 8.6   BILIRUBIN mg/dL  --   --   --  2.9*   ALK PHOS U/L  --   --   --  137*   ALT (SGPT) U/L  --   --   --  64*   AST (SGOT) U/L  --   --   --  58*   GLUCOSE mg/dL 101* 103* 140* 115*     Results from last 7 days   Lab Units 03/14/22 0521 03/13/22 1702 03/13/22  0631   MAGNESIUM mg/dL  --   --  1.6   PHOSPHORUS mg/dL 2.7   < > 3.7   HEMOGLOBIN g/dL 9.4*  --  11.1*   HEMATOCRIT % 27.5*  --  33.1*   TRIGLYCERIDES mg/dL  --   --  63    < > = values in this interval not displayed.     No results found for: COVID19  No results found for: HGBA1C     Medications    Scheduled " Medications carvedilol, 3.125 mg, Oral, BID With Meals  cefTRIAXone, 1 g, Intravenous, Q24H  enoxaparin, 40 mg, Subcutaneous, Q24H  ipratropium-albuterol, 3 mL, Nebulization, 4x Daily - RT  LORazepam, 0.5 mg, Intravenous, Q8H  midodrine, 10 mg, Oral, TID AC  nystatin, , Topical, Q12H  pantoprazole, 40 mg, Oral, Q AM  sodium chloride, 10 mL, Intravenous, Q12H  traZODone, 25 mg, Oral, Nightly        Infusions      PRN Medications •  albuterol  •  LORazepam **OR** LORazepam **OR** LORazepam **OR** LORazepam **OR** LORazepam **OR** LORazepam  •  sodium chloride     Physical Findings        Trending Physical   Appearance, NFPE 3/14: NFPE completed, consistent with nutrition diagnosis of malnutrition using AND/ASPEN criteria. See MSA below.      --  Edema  2+ edema to both feet      Bowel Function Last BM 3/13 (yesterday)     Tubes No feeding tube     Chewing/Swallowing No issues per patient      Skin Stage 3 to left gluteal  Stage 3 to right gluteal  Right proximal arm wound  Left lower leg abrasion  Rash to groin     --  Current Nutrition Orders & Evaluation of Intake       Oral Nutrition     Food Allergies NKFA   Current PO Diet Diet Cardiac; 2gm Na+; Fluid Restriction; 1200cc/day   Supplement None ordered    PO Evaluation     Trending % PO Intake 3/14: 25% average po intakes for 2 documented meals thus far this admission    --  Nutritional Risk Screening        NRS-2002 Score          Nutrition Diagnosis         Nutrition Dx Problem 1 Severe chronic disease related malnutrition related to past medical history including heart failure, SIRS as evidenced by fat/muscle loss per physical exam, patient reported PO intakes less than 75% for greater than 1 month.        Nutrition Dx Problem 2 Increased nutrient needs (arginaid) related to increased needs for healing as evidenced by wound.       Intervention Goal         Intervention Goal(s) PO intakes at least 50%  Accept ONS  Wound healing      Nutrition Intervention         RD Action Add Magic Cup QD  Add Taye BID     Nutrition Prescription          Diet Prescription 2 gram Na+ 1200 mL fluid restriction    Supplement Prescription Magic Cup QD  Taye BID   --  Monitor/Evaluation        Monitor Per protocol, I&O, PO intake, Supplement intake, Pertinent labs, Weight, Skin status, GI status, Symptoms, POC/GOC, Swallow function     Malnutrition Severity Assessment      Patient meets criteria for : Severe Malnutrition  Malnutrition Type (last 8 hours)     Malnutrition Severity Assessment     Row Name 03/14/22 1128       Malnutrition Severity Assessment    Malnutrition Type Chronic Disease - Related Malnutrition    Row Name 03/14/22 1128       Insufficient Energy Intake     Insufficient Energy Intake Findings Severe    Insufficient Energy Intake  <75% of est. energy requirement for > or equal to 1 month    Row Name 03/14/22 1128       Muscle Loss    Loss of Muscle Mass Findings Severe    [place] Region Severe - deep hollowing/scooping, lack of muscle to touch, facial bones well defined    Clavicle Bone Region Severe - protruding prominent bone    Acromion Bone Region Severe - squared shoulders, bones, and acromion process protrusion prominent    Scapular Bone Region --  Supine    Dorsal Hand Region Severe - prominent depression    Patellar Region Severe - prominent bone, square looking, very little muscle definition    Anterior Thigh Region Severe - line/depression along thigh, obviously thin    Posterior Calf Region Severe - thin with very little definition/firmness    Row Name 03/14/22 1128       Fat Loss    Subcutaneous Fat Loss Findings Severe    Orbital Region  Severe - pronounced hollowness/depression, dark circles, loose saggy skin    Upper Arm Region Severe - mostly skin, very little space between folds, fingers touch    Thoracic & Lumbar Region Severe - ribs visible with prominent depressions, iliac crest very prominent    Row Name 03/14/22 1128       Criteria Met (Must meet  criteria for severity in at least 2 of these categories: M Wasting, Fat Loss, Fluid, Secondary Signs, Wt. Status, Intake)    Patient meets criteria for  Severe Malnutrition                 Electronically signed by:  Breonna Ferguson RD  03/14/22 09:44 EDT

## 2022-03-14 NOTE — CASE MANAGEMENT/SOCIAL WORK
Continued Stay Note   [facility]     Patient Name: Shanelle Solis  MRN: [id number]  Today's Date: 3/14/2022    Admit Date: 3/13/2022     Discharge Plan     Row Name 03/14/22 1635       Plan    Plan DC Plan: Additional rehab choices pending at this time. No precert required, PASRR per facility. From home with caregivers and [facility].    Plan Comments Received update from [facility] that it does not look like they will have availability at this time. Liaison also reported that they are not sure they can accommodate her due to alcoholism at this time. CM attempted to contact patient to notify and obtain additional choices but no answer. CM will follow-up at later time.              Phone communication or documentation only - no physical contact with patient or family.    Aurelia Henao RN     Office Phone: [phone]  Office Cell: [phone]

## 2022-03-14 NOTE — THERAPY EVALUATION
"Patient Name: Shanelle Solis  : 1955    MRN: [id number]                              Today's Date: 3/14/2022       Admit Date: 3/13/2022    Visit Dx: No diagnosis found.  Patient Active Problem List   Diagnosis   • CHF exacerbation (HCC)   • Hyponatremia with excess extracellular fluid volume   • Elevated liver enzymes   • Thrombocytopenia (HCC)   • Lactic acidosis   • SIRS (systemic inflammatory response syndrome) (HCC)   • Skin excoriation   • Dermatitis associated with moisture   • Unstageable pressure ulcer of sacral region (HCC)   • Severe malnutrition (CMS/HCC)     Past Medical History:   Diagnosis Date   • Alcohol abuse    • Alcoholic cirrhosis (HCC)    • Asthma    • Cataract    • Cellulitis of leg    • Chronic gastritis    • COPD (chronic obstructive pulmonary disease) (HCC)    • GERD (gastroesophageal reflux disease)    • History of cervical cancer     Treated with conization   • History of right breast cancer     Treated with chemotherapy     Past Surgical History:   Procedure Laterality Date   • COLONOSCOPY     • FOOT SURGERY        General Information     Row Name 22 140          General Information    Prior Level of Function independent:;transfer;w/c or scooter;min assist:;ADL's  pt has CG 2 days/week. She is normally mod (A) for toileting & dressing. Pt states she uses her legs to propel her w/c and not her arms. Has lift recliner. Has home O2 but reports inconsistent use, \"I only use it when I feel like it.\"  -     Existing Precautions/Restrictions fall;oxygen therapy device and L/min  -     Barriers to Rehab medically complex;previous functional deficit;ineffective coping  -     Row Name 22 140          Living Environment    People in Home alone  -     Row Name 22 140          Cognition    Orientation Status (Cognition) oriented to;person;place;time  -     Row Name 22 140          Safety Issues, Functional Mobility    Safety Issues Affecting Function " (Mobility) insight into deficits/self-awareness;awareness of need for assistance;judgment;problem-solving  -     Impairments Affecting Function (Mobility) balance;cognition;endurance/activity tolerance;strength;shortness of breath  -           User Key  (r) = Recorded By, (t) = Taken By, (c) = Cosigned By    Initials Name Provider Type     Junie Camp OT Occupational Therapist                 Mobility/ADL's     Row Name 03/14/22 1405          Bed Mobility    Comment, (Bed Mobility) unable to mobilize OOB because pt was hypothermic & needing to be under a heating blanket. Pt was reporting no symptoms related to her hypothermia at 95 degrees F.  -     Row Name 03/14/22 1405          Activities of Daily Living    BADL Assessment/Intervention grooming;feeding;clothing fastener management  -     Row Name 03/14/22 1405          Grooming Assessment/Training    [place] Level (Grooming) hair care, combing/brushing;dependent (less than 25% patient effort)  -     Row Name 03/14/22 1405          Self-Feeding Assessment/Training    [place] Level (Feeding) feeding skills;modified independence  -     Row Name 03/14/22 1405          Clothes Fastener Management    [place] Level (Clothes Fastener Management) clothes fastener management;dependent (less than 25% patient effort)  -           User Key  (r) = Recorded By, (t) = Taken By, (c) = Cosigned By    Initials Name Provider Type     Junie Camp OT Occupational Therapist               Obj/Interventions     Row Name 03/14/22 1408          Range of Motion Comprehensive    Comment, General Range of Motion shld flex 50%; knee extension 80% & ankles do not flex well into neutral.  -           User Key  (r) = Recorded By, (t) = Taken By, (c) = Cosigned By    Initials Name Provider Type     Junie Camp OT Occupational Therapist               Goals/Plan     Row Name 03/14/22 1417          Bed Mobility Goal 1 (OT)    Activity/Assistive Device  (Bed Mobility Goal 1, OT) bed mobility activities, all  -     [place] Level/Cues Needed (Bed Mobility Goal 1, OT) minimum assist (75% or more patient effort)  -     Time Frame (Bed Mobility Goal 1, OT) 2 weeks  -     Row Name 03/14/22 1417          Transfer Goal 1 (OT)    Activity/Assistive Device (Transfer Goal 1, OT) sit-to-stand/stand-to-sit;bed-to-chair/chair-to-bed;commode, bedside without drop arms  -     [place] Level/Cues Needed (Transfer Goal 1, OT) minimum assist (75% or more patient effort)  -     Time Frame (Transfer Goal 1, OT) 2 weeks  -     Row Name 03/14/22 1417          Toileting Goal 1 (OT)    Activity/Device (Toileting Goal 1, OT) adjust/manage clothing;perform perineal hygiene;commode, bedside without drop arms  -     [place] Level/Cues Needed (Toileting Goal 1, OT) moderate assist (50-74% patient effort)  -     Time Frame (Toileting Goal 1, OT) 2 weeks  -     Row Name 03/14/22 1417          Therapy Assessment/Plan (OT)    Planned Therapy Interventions (OT) activity tolerance training;adaptive equipment training;BADL retraining;functional balance retraining;occupation/activity based interventions;patient/caregiver education/training;transfer/mobility retraining;ROM/therapeutic exercise;edema control/reduction  -           User Key  (r) = Recorded By, (t) = Taken By, (c) = Cosigned By    Initials Name Provider Type     Junie Camp, OT Occupational Therapist               Clinical Impression     Row Name 03/14/22 1412          Pain Assessment    Pretreatment Pain Rating 0/10 - no pain  -     Posttreatment Pain Rating 0/10 - no pain  -     Row Name 03/14/22 1412          Plan of Care Review    Plan of Care Reviewed With patient  -     Progress no change  -     Outcome Evaluation 67-year-old  female with history of alcohol abuse, cirrhosis, asthma/COPD, GERD, breast cancer treated with radiation, cervical cancer, and tobacco dependence,  "presented to the ED of [facility] via EMS after her caregiver found her to be tachycardic & hypotensive. Pt dx AE CHF. Confusion was also reported and patient stated that she been having shortness of breath, leg swelling, unable to get out of her chair by herself.  Pt has been on ABX for leg cellulitis. This date she is hypothermic and hypotensive and required bed-level OT evaluation. No acute confusion was noted but pt did demonstrate acute on chronic self care & basic mobility deficit. Pt stated she was agreeable to rehab though \"in no hurry to leave. Maybe in a week or 2.\" Pt normally transfers herself and propells her w/c with her legs. She uses a lift recliner and has a HH aide to assist w/ bathing & IADL. She reports she was calling a neighbor to assist her with toilet transfers prior to coming to the ER.  -     Row Name 03/14/22 1412          Therapy Assessment/Plan (OT)    Rehab Potential (OT) fair, will monitor progress closely  -     Criteria for Skilled Therapeutic Interventions Met (OT) skilled treatment is necessary  -     Therapy Frequency (OT) 3 times/wk  -     Predicted Duration of Therapy Intervention (OT) until d/c  -     Row Name 03/14/22 1412          Therapy Plan Review/Discharge Plan (OT)    Anticipated Discharge Disposition (OT) inpatient rehabilitation facility  -     Row Name 03/14/22 1412          Vital Signs    Pre Systolic BP Rehab 80  -MH     Pre Treatment Diastolic BP 49  -MH     Pre SpO2 (%) 97  -MH     O2 Delivery Pre Treatment supplemental O2  -MH     O2 Delivery Intra Treatment supplemental O2  -MH     Post SpO2 (%) 98  -MH     O2 Delivery Post Treatment supplemental O2  -MH     Pre Patient Position Supine  -MH     Intra Patient Position Supine  -MH     Post Patient Position Supine  -     Row Name 03/14/22 1412          Positioning and Restraints    Pre-Treatment Position in bed  -MH     Post Treatment Position bed  -MH     In Bed notified " nsg;fowlers;exit alarm on;encouraged to call for assist;call light within reach  -           User Key  (r) = Recorded By, (t) = Taken By, (c) = Cosigned By    Initials Name Provider Type    Junie Ibanez OT Occupational Therapist               Outcome Measures     Row Name 03/14/22 1419          How much help from another is currently needed...    Putting on and taking off regular lower body clothing? 2  -MH     Bathing (including washing, rinsing, and drying) 2  -MH     Toileting (which includes using toilet bed pan or urinal) 2  -MH     Putting on and taking off regular upper body clothing 2  -MH     Taking care of personal grooming (such as brushing teeth) 2  -MH     Eating meals 3  -     AM-PAC 6 Clicks Score (OT) 13  [facility]     Row Name 03/14/22 1419          Functional Assessment    Outcome Measure Options AM-PAC 6 Clicks Daily Activity (OT)  -           User Key  (r) = Recorded By, (t) = Taken By, (c) = Cosigned By    Initials Name Provider Type    Junie Ibanez OT Occupational Therapist                Occupational Therapy Education                 Title: PT OT SLP Therapies (In Progress)     Topic: Occupational Therapy (In Progress)     Point: ADL training (Done)     Description:   Instruct learner(s) on proper safety adaptation and remediation techniques during self care or transfers.   Instruct in proper use of assistive devices.              Learning Progress Summary           Patient Acceptance, E,TB, VU,NR by  at 3/14/2022 1420                   Point: Home exercise program (Done)     Description:   Instruct learner(s) on appropriate technique for monitoring, assisting and/or progressing therapeutic exercises/activities.              Learning Progress Summary           Patient Acceptance, E,TB, VU,NR by  at 3/14/2022 1420                   Point: Precautions (Done)     Description:   Instruct learner(s) on prescribed precautions during self-care and functional transfers.               "Learning Progress Summary           Patient Acceptance, E,TB, VU,NR by  at 3/14/2022 1420                   Point: Body mechanics (Not Started)     Description:   Instruct learner(s) on proper positioning and spine alignment during self-care, functional mobility activities and/or exercises.              Learner Progress:  Not documented in this visit.                      User Key     Initials Effective Dates Name Provider Type Discipline     06/16/21 -  Junie Camp, GRACIA Occupational Therapist OT              OT Recommendation and Plan  Planned Therapy Interventions (OT): activity tolerance training, adaptive equipment training, BADL retraining, functional balance retraining, occupation/activity based interventions, patient/caregiver education/training, transfer/mobility retraining, ROM/therapeutic exercise, edema control/reduction  Therapy Frequency (OT): 3 times/wk  Plan of Care Review  Plan of Care Reviewed With: patient  Progress: no change  Outcome Evaluation: 67-year-old  female with history of alcohol abuse, cirrhosis, asthma/COPD, GERD, breast cancer treated with radiation, cervical cancer, and tobacco dependence, presented to the ED of [facility] via EMS after her caregiver found her to be tachycardic & hypotensive. Pt dx AE CHF. Confusion was also reported and patient stated that she been having shortness of breath, leg swelling, unable to get out of her chair by herself.  Pt has been on ABX for leg cellulitis. This date she is hypothermic and hypotensive and required bed-level OT evaluation. No acute confusion was noted but pt did demonstrate acute on chronic self care & basic mobility deficit. Pt stated she was agreeable to rehab though \"in no hurry to leave. Maybe in a week or 2.\" Pt normally transfers herself and propells her w/c with her legs. She uses a lift recliner and has a HH aide to assist w/ bathing & IADL. She reports she was calling a neighbor to assist her with " toilet transfers prior to coming to the ER.     Time Calculation:    Time Calculation- OT     Row Name 03/14/22 1420             Time Calculation-     OT Start Time 1203  -      OT Stop Time 1224  -      OT Time Calculation (min) 21 min  -      Total Timed Code Minutes- OT 0 minute(s)  -      OT Received On 03/14/22  -      OT - Next Appointment 03/16/22  -      OT Goal Re-Cert Due Date 03/28/22  -            User Key  (r) = Recorded By, (t) = Taken By, (c) = Cosigned By    Initials Name Provider Type     Junie Camp OT Occupational Therapist              Therapy Charges for Today     Code Description Service Date Service Provider Modifiers Qty    [id number] HC OT EVAL MOD COMPLEXITY 3 3/14/2022 Junie Camp OT GO 1               Junie Camp OT  3/14/2022

## 2022-03-14 NOTE — PLAN OF CARE
"Goal Outcome Evaluation:  Plan of Care Reviewed With: patient        Progress: no change  Outcome Evaluation: 67-year-old  female with history of alcohol abuse, cirrhosis, asthma/COPD, GERD, breast cancer treated with radiation, cervical cancer, and tobacco dependence, presented to the ED of [facility] via EMS after her caregiver found her to be tachycardic & hypotensive. Pt dx AE CHF. Confusion was also reported and patient stated that she been having shortness of breath, leg swelling, unable to get out of her chair by herself.  Pt has been on ABX for leg cellulitis. This date she is hypothermic and hypotensive and required bed-level OT evaluation. No acute confusion was noted but pt did demonstrate acute on chronic self care & basic mobility deficit. Pt stated she was agreeable to rehab though \"in no hurry to leave. Maybe in a week or 2.\" Pt normally transfers herself and propells her w/c with her legs. She uses a lift recliner and has a HH aide to assist w/ bathing & IADL. She reports she was calling a neighbor to assist her with toilet transfers prior to coming to the ER.  "

## 2022-03-14 NOTE — PLAN OF CARE
Problem: Adult Inpatient Plan of Care  Goal: Absence of Hospital-Acquired Illness or Injury  Intervention: Identify and Manage Fall Risk  Recent Flowsheet Documentation  Taken 3/14/2022 1802 by Graciela Hargrove RN  Safety Promotion/Fall Prevention:   activity supervised   fall prevention program maintained   nonskid shoes/slippers when out of bed   safety round/check completed  Taken 3/14/2022 1605 by Graciela Hargrove RN  Safety Promotion/Fall Prevention:   nonskid shoes/slippers when out of bed   safety round/check completed   activity supervised   fall prevention program maintained  Taken 3/14/2022 1444 by Graciela Hargrove RN  Safety Promotion/Fall Prevention:   nonskid shoes/slippers when out of bed   safety round/check completed  Taken 3/14/2022 1214 by Graciela Hargrove RN  Safety Promotion/Fall Prevention:   activity supervised   fall prevention program maintained   nonskid shoes/slippers when out of bed   safety round/check completed  Taken 3/14/2022 1048 by Graciela Hargrove RN  Safety Promotion/Fall Prevention:   activity supervised   fall prevention program maintained   nonskid shoes/slippers when out of bed   safety round/check completed  Taken 3/14/2022 0832 by Graciela Hargrove RN  Safety Promotion/Fall Prevention:   activity supervised   fall prevention program maintained   nonskid shoes/slippers when out of bed   safety round/check completed  Intervention: Prevent Skin Injury  Recent Flowsheet Documentation  Taken 3/14/2022 1605 by Graciela Hargrove RN  Body Position: turned  Skin Protection:   drying agents applied   skin sealant/moisture barrier applied  Taken 3/14/2022 1214 by Graciela Hargrove RN  Body Position: weight shifting  Taken 3/14/2022 0832 by Graciela Hargrove RN  Skin Protection:   drying agents applied   incontinence pads utilized  Intervention: Prevent and Manage VTE (Venous Thromboembolism) Risk  Recent Flowsheet Documentation  Taken 3/14/2022 1605 by Graciela Hargrove  RN  Activity Management: activity adjusted per tolerance  Taken 3/14/2022 1214 by Graciela Hargrove RN  Activity Management: activity adjusted per tolerance  Taken 3/14/2022 0832 by Graciela Hargrove RN  Activity Management: activity adjusted per tolerance  Goal: Optimal Comfort and Wellbeing  Intervention: Provide Person-Centered Care  Recent Flowsheet Documentation  Taken 3/14/2022 1605 by Graciela Hargrove RN  Trust Relationship/Rapport: care explained  Taken 3/14/2022 1214 by Graciela Hargrove RN  Trust Relationship/Rapport:   care explained   questions encouraged  Taken 3/14/2022 0832 by Graciela Hargrove RN  Trust Relationship/Rapport:   care explained   questions answered   reassurance provided   thoughts/feelings acknowledged   Goal Outcome Evaluation:  Pt on turn team.  Has significant excoriation on lower extremity from waist to knee.  Pt on specialty bed.  Pt is hypotensive on Midodrine and fluids.  Hgb is down to 9.4 Dr Hassan is aware.  No new orders.

## 2022-03-14 NOTE — DISCHARGE PLACEMENT REQUEST
"Shanelle Bass (67 y.o. Female)             Date of Birth   1955    Social Security Number       Address   [address] [place] [address] [address] [place] [place] [facility]    Home Phone   [phone]    MRN   [id number]       Rastafari   None    Marital Status   Single                            Admission Date   3/13/22    Admission Type   Urgent    Admitting Provider   Eloy Duke DO    Attending Provider   Eloy Duke DO    Department, Room/Bed   [facility], 2117/1       Discharge Date       Discharge Disposition       Discharge Destination                               Attending Provider: Eloy Duke DO    Allergies: No Known Allergies    Isolation: None   Infection: None   Code Status: No CPR   Advance Care Planning Activity    Ht: 165.1 cm (65\")   Wt: 53.1 kg (117 lb)    Admission Cmt: None   Principal Problem: CHF exacerbation (HCC) [I50.9]                 Active Insurance as of 3/13/2022     Primary Coverage     Payor Plan Insurance Group Employer/Plan Group    MEDICARE MEDICARE A & B      Payor Plan Address Payor Plan Phone Number Payor Plan Fax Number Effective Dates    [address] [phone]  1/1/2022 - None Entered    [facility] [id number]       Subscriber Name Subscriber Birth Date Member ID       SHANELLE BASS 1955 [id number]           Secondary Coverage     Payor Plan Insurance Group Employer/Plan Group    [id number]      Payor Plan Address Payor Plan Phone Number Payor Plan Fax Number Effective Dates    [address]   3/1/2022 - None Entered    [id number]       Subscriber Name Subscriber Birth Date Member ID       SHANELLE BASS 1955 [id number]                 Emergency Contacts      (Rel.) Home Phone Work Phone Mobile Phone    CARLOSKATJAFERMIN (Daughter) -- -- [phone]    dana centeno (Care Giver) [phone] -- --               History & Physical      Nilda Higgins MD at 03/13/22 " "0423              [facility] Medicine Services      Patient Name: Shanelle Solis  : 1955  MRN: [id number]  Primary Care Physician:  Provider, No Known  Date of admission: 3/13/2022      Subjective      Chief Complaint: General weakness.    History of Present Illness:   67-year-old  female with history of alcohol abuse, cirrhosis, asthma/COPD, GERD, breast cancer treated with radiation, cervical cancer, and tobacco dependence, presented to the ED of [facility] via EMS after her caregiver came yesterday and checked her to find out low blood pressure and tachycardia.  Confusion was also reported and patient stated that she been having shortness of breath, leg swelling, unable to get out of her chair by herself.  Patient reported that she has been getting worse over the past few months.  She states that she was recently placed on Keflex by her PCP to treat leg cellulitis since Tuesday.  She denies having fever or chills, chest pain, cough, or any other complaint.    Emergency department course:  Afebrile, tachycardic, soft BP, no acute distress.  Physical examination per ED physician was significant for presence of red rash and extensive skin excoriation in the suprapubic area perineal thighs and coccygeal area.  Labs were significant for BNP 1514, serum sodium level 114, alkaline phosphatase 136, ALT 60, AST 80, total bilirubin 3.4, lactate 6.0, D-dimer 0.7, WBC count 17.8 and platelet count 120.  Chest x-ray reported \"no acute cardiopulmonary process, severe emphysema, and moderate to large hiatal hernia \".  EKG showed sinus tachycardia with no ischemic changes.  Patient was diagnosed by ED physician as having sepsis and CHF but it seems nothing was given for the patient over there.  He called hospitalist Dr. Hoang requesting transferred to our institution and the transfer was accepted.    ROS   Please refer to HPI. All other systems were reviewed and were negative. "     Personal History     Past Medical History:   Diagnosis Date   • Alcohol abuse    • Alcoholic cirrhosis (HCC)    • Asthma    • Cataract    • Cellulitis of leg    • Chronic gastritis    • COPD (chronic obstructive pulmonary disease) (HCC)    • GERD (gastroesophageal reflux disease)    • History of cervical cancer     Treated with conization   • History of right breast cancer     Treated with chemotherapy       Past Surgical History:   Procedure Laterality Date   • COLONOSCOPY     • FOOT SURGERY         Family History: family history includes Cancer in her maternal grandmother; Pneumonia in her father; Suicide Attempts in her mother. Otherwise pertinent FHx was reviewed and not pertinent to current issue.    Social History:  reports that she has been smoking cigarettes. She started smoking today. She has been smoking about 0.25 packs per day. She uses smokeless tobacco. She reports current alcohol use. She reports that she does not use drugs.    Home Medications:  Prior to Admission Medications     None            Allergies:  No Known Allergies    Objective      Vitals:   Temp:  [98.1 °F (36.7 °C)] 98.1 °F (36.7 °C)  Heart Rate:  [101] 101  Resp:  [19] 19  BP: (121)/(94) 121/94    Physical Exam   General: Cachectic, alert and oriented x3, no acute distress.   Eyes: Tinge of jaundice, moist conjunctivae with no lig lag; PERRLA.  HENT:  Normocephalic, atraumatic, moist oral mucosa.  Neck: Supple, no bruit, JVP positive, no thyroid or lymph node enlargement, trachea central,   Lungs: Bilateral fine crepitations and expiratory rhonchi.  Heart: RRR, no murmur or rub.   Abdomen:  Soft, not tender, not distended, no organomegaly, bowel sounds positive.   Extremities: Mild leg edema with faint evidence of cellulitis, no calf tenderness, normal range of movement, pedal pulses intact.   Skin: Extensive skin excoriation and white areas of red rash involving lower abdominal, perineal, sacrococcygeal and upper  thighs.  Neurology:  Grossly intact.   Psychiatric exam: Pleasant, cooperative, appropriate mood and affect, intact judgment and insight.      Result Review    Result Review:  I have personally reviewed the results from the time of this admission to 3/13/2022 04:43 EDT and agree with these findings:  [x]  Laboratory  [x]  Microbiology  [x]  Radiology  [x]  EKG/Telemetry   []  Cardiology/Vascular   []  Pathology  [x]  Old records  []  Other:    Assessment/Plan        Active Hospital Problems:  Active Hospital Problems    Diagnosis    • **CHF exacerbation (HCC)    • Hyponatremia with excess extracellular fluid volume    • Elevated liver enzymes    • Thrombocytopenia (HCC)    • Lactic acidosis    • SIRS (systemic inflammatory response syndrome) (HCC)    • Skin excoriation      Assessment:  1.  CHF exacerbation.  -Consult cardiology.  -Implement CHF protocol with daily weight, strict I's and O's, fluid restriction, cardiac diet, IV Lasix, beta-blocker and ACE inhibitor.  -2D echocardiogram.  -Chest x-ray.  -Check troponin and proBNP levels.  Check TSH level.  -  2.  Hypo osmolar hyponatremia-likely hypervolemic.  -Consult nephrology.  -Fluid restrictions.  -Close monitoring of serum sodium level.  -Serum and urine osmolalities and random urine sodium level.    3.  Sepsis with lactic acidosis.  -Empiric antibiotic coverage with Rocephin pending the blood culture results.  -Trend lactic acid level.  -Monitor hemodynamic status, renal function, and electrolyte levels.    4.  Leg cellulitis with extensive skin excoriation and cutaneous candidiasis.  -Consult wound care.  -Nystatin powder twice daily.    5.  Hx of alcoholic cirrhosis with elevated liver enzymes, jaundice, and thrombocytopenia.  -Check CMP and GGT.  -Monitor platelet count.    6.  General weakness and cachexia.  -PT/OT evaluation.  -Inpatient nutrition consult.    7.  COPD/asthma -patient does not use her home O2.  -Supplemental O2.  -Inhalers and  nebulizers.  -ABG analysis on room air.    8.  GERD.  -On PPI.    9.  Alcohol abuse.  -CIWA protocol.    10.  Tobacco dependence.  -Tobacco cessation education.        DVT prophylaxis:  Subcutaneous Lovenox.    CODE STATUS:    Medical Intervention Limits: NO intubation (DNI); NO cardioversion; NO antiarrhythmic drugs  Level Of Support Discussed With: Patient  Code Status (Patient has no pulse and is not breathing): No CPR (Do Not Attempt to Resuscitate)  Medical Interventions (Patient has pulse or is breathing): Limited Support    Admission Status:  I believe this patient meets inpatient status.    I discussed the patient's findings and my recommendations with patient.    This patient has been examined wearing appropriate Personal Protective Equipment and discussed with hospital infection control department. 03/13/22      Signature: Electronically signed by Nilda Higgins MD, 03/13/22, 4:53 AM EDT.      Electronically signed by Nilda Higgins MD at 03/13/22 0064

## 2022-03-14 NOTE — CASE MANAGEMENT/SOCIAL WORK
Social Work Assessment   [facility]     Patient Name: Shanelle Solis  MRN: [id number]  Today's Date: 3/14/2022    Admit Date: 3/13/2022     Substance Abuse     Row Name 03/14/22 1529       Substance Use    Substance Use Status current alcohol use    Substance Use Comment SW was consulted 2/2 daily alcohol use in the setting of cirrhosis. Prior to entering room, a nurse leader approached SW and provided information from their assessment, such as she lives alone, has primary care through [facility], has home delivered meds, and appears malnourished. SW met with pt in room 2117 to inquire further and offer alcohol cessation options. Pt sitting up in bed eating lunch, no visitors present. SW introduced self and reason for consult. Pt affirmed the above information and states her medicines are delivered from [facility]. SW inquired of getting home delivered meals set up, and pt stated she previously received them through [employer], but didn’t like the taste so they were discontinued. She reports her caregivers, Aidee and Nancy (who are a mother and dtr, but unrelated to pt) from [employer] get pt’s groceries and come check on her twice per week. Pt stated she walked up until the beginning of COVID, but now uses a WC to ambulate. SW inquired how she obtains alcohol, and pt reported she has a friend that brings it. Pt stated she drinks multiple cocktails everyday, and in fact, had a stockpile of orange juice from [employer], and that’s why she started drinking vodka again. SW asked about pt’s understanding that alcohol is harming her liver, and that she can’t live without her liver, and pt affirmed she is aware, stating, “Yeah, I know, but you know, you only live once,” and proceeded to say, “when I’m ready to quit, I’ll quit.” SW inquired of any additional needs pt may have, and she asked for SW to bring her a Screwdriver. She denies social needs at this time.              Met with patient in room wearing PPE: mask.       Maintained distance greater than six feet and spent less than 15 minutes in the room.    CELI Javier, [facility]  Medical Social Worker  Ph [phone]  Fax [phone]  [email or web address]

## 2022-03-14 NOTE — PROGRESS NOTES
Cardiology Progress  Note      Patient Care Team:  Provider, No Known as PCP - General    PATIENT IDENTIFICATION  Name: Shanelle Solis  Age: 67 y.o.  Sex: female  :  1955  MRN: [id number]             REASON FOR FOLLOW-UP:  Fluid overload/congestive heart failure        SUBJECTIVE    Patient seen and examined, chart and labs reviewed.  Patient lying in bed asleep upon entry, normal respiratory effort.  Rhythm sinus at 81.  She denies any chest discomfort, palpitations, nausea or vomiting.  Blood pressure soft at 92 systolic.      REVIEW OF SYSTEMS:  Pertinent items are noted in HPI, all other systems reviewed and negative    OBJECTIVE       ASSESSMENT  Acute heart failure with preserved ejection fraction  Hyponatremia  Hyper kalemia  Altered mental status-resolved  Hypotension/tachycardia-improved  Lower extremity cellulitis  Sepsis with lactic acidosis  EtOH abuse with cirrhosis  COPD  GERD  History of breast cancer  Tobacco dependence disorder  Generalized weakness          RECOMMENDATIONS  TTE with EF 61-65%, RA mod dilated, technically difficult study, valvular structures not well visualized.  No evidence of pericardial effusion.  Nephrology has been consulted.  CHF protocol initiated with fluid restriction, cardiac diet, IV Lasix 40 mg daily.  Receiving carvedilol 3.125 mg every 12  Patient receiving empiric antibiotic coverage for cellulitis  CIWA protocol initiated  Monitor renal function and electrolytes and replace as needed    2022  Diastolic heart failure  Heart failure with preserved ejection fraction  Electrolyte abnormalities  Altered mental status  Lactic acidosis  Alcohol abuse  Frail status  Continue supportive care  Discussed with the patient at bedside    Chart and labs reviewed.  History and exam findings are verified with above changes noted.  Assessment and plan notated by APC after being formulated by attending consultant.  Note that greater than 50% of the time spent  "in care of the patient was provided by attending consultant.        Vital Signs  Visit Vitals  BP 92/47   Pulse 78   Temp 95 °F (35 °C) (Rectal)   Resp 16   Ht 165.1 cm (65\")   Wt 53.1 kg (117 lb)   SpO2 94%   BMI 19.47 kg/m²     Oxygen Therapy  SpO2: 94 %  Pulse Oximetry Type: Continuous  Device (Oxygen Therapy): nasal cannula  Flow (L/min): 3  Flowsheet Rows    Flowsheet Row First Filed Value   Admission Height 165.1 cm (65\") Documented at 03/13/2022 0142   Admission Weight 53.2 kg (117 lb 4.6 oz) Documented at 03/13/2022 0142        Intake & Output (last 3 days)       03/11 0701  03/12 0700 03/12 0701 03/13 0700 03/13 0701  03/14 0700 03/14 0701  03/15 0700    P.O.   100 150    IV Piggyback   100     Total Intake(mL/kg)   200 (3.8) 150 (2.8)    Urine (mL/kg/hr)   200 (0.2)     Total Output   200     Net   0 +150                Lines, Drains & Airways     Active LDAs     Name Placement date Placement time Site Days    Peripheral IV 03/13/22 0400 Anterior;Right Forearm 03/13/22  0400  placed from other hospital  Forearm  1    External Urinary Catheter 03/13/22  1157  --  1                       BP 92/47   Pulse 78   Temp 95 °F (35 °C) (Rectal)   Resp 16   Ht 165.1 cm (65\")   Wt 53.1 kg (117 lb)   SpO2 94%   BMI 19.47 kg/m²   Intake/Output last 3 shifts:  I/O last 3 completed shifts:  In: 200 [P.O.:100; IV Piggyback:100]  Out: 200 [Urine:200]  Intake/Output this shift:  I/O this shift:  In: 150 [P.O.:150]  Out: -     PHYSICAL EXAM:  General:          Well-developed, thin 67-year-old female who is alert, cooperative, no distress, appears stated age  Head:              Normocephalic, atraumatic, mucous membranes moist  Eyes:               Conjunctiva/corneas clear, EOM's intact     Neck:              Supple,  no bruit  Lungs:            Very coarse to auscultation with expiratory wheezes bilaterally, decreased airflow.  Chest wall:     No tenderness  Heart::             Regular rate and rhythm, S1 and S2 " normal, 1/6 holosystolic murmur.  No rub or gallop  Abdomen:      Soft, non-tender, nondistended bowel sounds active  Extremities:    Trace edema to lower extremities  Pulses:           Diminished pedal pulses.  Skin:                No rashes or lesions  Neuro/psych: A&O x3. CN II through XII are grossly intact with flat affect      Scheduled Meds:      aluminum sulfate-calcium acetate 1:20, 500 mL, Topical, Q8H  carvedilol, 3.125 mg, Oral, BID With Meals  cefTRIAXone, 1 g, Intravenous, Q24H  enoxaparin, 40 mg, Subcutaneous, Q24H  hydrocortisone-bacitracin-zinc oxide-nystatin, 1 application, Topical, TID  ipratropium-albuterol, 3 mL, Nebulization, 4x Daily - RT  LORazepam, 0.5 mg, Intravenous, Q8H  midodrine, 10 mg, Oral, TID AC  multivitamin, 1 tablet, Oral, Daily  nystatin, , Topical, Q12H  pantoprazole, 40 mg, Oral, Q AM  sodium chloride, 10 mL, Intravenous, Q12H  [START ON 3/15/2022] thiamine, 100 mg, Oral, Daily  traZODone, 25 mg, Oral, Nightly        Continuous Infusions:         PRN Meds:    •  albuterol  •  LORazepam **OR** LORazepam **OR** LORazepam **OR** LORazepam **OR** LORazepam **OR** LORazepam  •  sodium chloride        Results Review:     I reviewed the patient's new clinical results.    CBC    Results from last 7 days   Lab Units 03/14/22  1236 03/14/22  0521 03/13/22  0631   WBC 10*3/mm3 5.80 5.30 10.20   HEMOGLOBIN g/dL 9.9* 9.4* 11.1*   PLATELETS 10*3/mm3 84* 77* 114*     Cr Clearance Estimated Creatinine Clearance: 58.8 mL/min (by C-G formula based on SCr of 0.59 mg/dL).  Coag   Results from last 7 days   Lab Units 03/13/22  0631   INR  1.21*     HbA1C No results found for: HGBA1C  Blood Glucose No results found for: POCGLU  Infection   Results from last 7 days   Lab Units 03/13/22  0635 03/13/22  0631   BLOODCX  No growth at 24 hours No growth at 24 hours     CMP   Results from last 7 days   Lab Units 03/14/22  1236 03/14/22  0521 03/13/22  2146 03/13/22  1702 03/13/22  0631   SODIUM mmol/L  122* 122* 120* 119* 116*   POTASSIUM mmol/L 4.7 4.7 4.8 5.1  5.1 6.1*   CHLORIDE mmol/L 84* 83* 80* 80* 77*   CO2 mmol/L 36.0* 39.0* 35.0* 36.0* 35.0*   BUN mg/dL 11 11 10 11 10   CREATININE mg/dL 0.59 0.51* 0.61 0.61 0.62   GLUCOSE mg/dL 127* 101* 103* 140* 115*   ALBUMIN g/dL  --  2.00* 2.10* 2.10* 2.40*   BILIRUBIN mg/dL  --   --   --   --  2.9*   ALK PHOS U/L  --   --   --   --  137*   AST (SGOT) U/L  --   --   --   --  58*   ALT (SGPT) U/L  --   --   --   --  64*   AMMONIA umol/L  --  20  --   --   --      ABG    Results from last 7 days   Lab Units 03/13/22  0459   PH, ARTERIAL pH units 7.391   PCO2, ARTERIAL mm Hg 60.5*   PO2 ART mm Hg 51.5*   O2 SATURATION ART % 84.4*   BASE EXCESS ART mmol/L 9.8*     UA      SHANICE  No results found for: POCMETH, POCAMPHET, POCBARBITUR, POCBENZO, POCCOCAINE, POCOPIATES, POCOXYCODO, POCPHENCYC, POCPROPOXY, POCTHC, POCTRICYC  Lysis Labs   Results from last 7 days   Lab Units 03/14/22  1236 03/14/22  0521 03/13/22  2146 03/13/22  1702 03/13/22  0631   INR   --   --   --   --  1.21*   HEMOGLOBIN g/dL 9.9* 9.4*  --   --  11.1*   PLATELETS 10*3/mm3 84* 77*  --   --  114*   CREATININE mg/dL 0.59 0.51* 0.61 0.61 0.62     Radiology(recent) XR Chest 1 View    Result Date: 3/13/2022  IMPRESSION : Hyperexpansion of the lungs compatible with severe COPD, emphysema.  Patchy opacity at the left apex may represent scarring although pneumonia cannot be excluded.  Electronically Signed By-Jeanmarie López On:3/13/2022 10:02 AM This report was finalized on [id number] by  Jeanmarie López, .        Results from last 7 days   Lab Units 03/14/22 0521 03/13/22 0631   CK TOTAL U/L 42  --    TROPONIN T ng/mL  --  <0.010       Xrays, labs reviewed personally by physician.    ECG/EMG Results (most recent)     Procedure Component Value Units Date/Time    Adult Transthoracic Echo Complete With Contrast if Necessary Per Protocol [[id number]] Resulted: 03/13/22 0929     Updated: 03/13/22 0931      Target HR (85%) 132 bpm      Max. Pred. HR (100%) 155 bpm      Ao root diam 2.16 cm      Ao pk gabriel 119.8 cm/sec      Ao V2 VTI 22.3 cm      RAS(I,D) 1.79 cm2      EDV(cubed) 24.2 ml      EDV(MOD-sp4) 29.2 ml      EF(MOD-bp) 63.0 %      EF(MOD-sp4) 62.8 %      ESV(cubed) 10.5 ml      ESV(MOD-sp4) 10.9 ml      IVS/LVPW 1.13 cm      LV mass(C)d 58.6 grams      LV V1 max PG 4.8 mmHg      LV V1 mean PG 1.97 mmHg      LV V1 max 110.1 cm/sec      LVPWd 0.78 cm      MV dec slope 340.5 cm/sec2      MV dec time 0.13 msec      MV V2 VTI 16.7 cm      MVA(VTI) 2.39 cm2      PA acc time 0.11 sec      PA pr(Accel) 29.2 mmHg      PA V2 max 118.0 cm/sec      RAP systole 3.0 mmHg      RV V1 max PG 6.6 mmHg      RV V1 max 128.3 cm/sec      RV V1 VTI 21.2 cm      RVIDd 3.1 cm      RVSP(TR) 18.3 mmHg      SI(MOD-sp4) 11.6 ml/m2      SV(LVOT) 40.0 ml      SV(MOD-sp4) 18.3 ml      SV(RVOT) 82.3 ml      TR max PG 15.3 mmHg      Ao max PG 5.7 mmHg      Ao mean PG 3.1 mmHg      FS 24.3 %      IVSd 0.88 cm      LA dimension(2D) 2.5 cm      LV V1 VTI 18.9 cm      LVIDd 2.9 cm      LVIDs 2.19 cm      LVOT area 2.11 cm2      LVOT diam 1.64 cm      MV E/A 0.70     MV max PG 3.4 mmHg      MV mean PG 1.91 mmHg      RVOT diam 2.22 cm      MV A max gabriel 64.8 cm/sec      MV E max gabriel 45.1 cm/sec      TR max gabriel 195.7 cm/sec      LV [place] Vol (BSA corrected) 18.6 cm2      LV Sys Vol (BSA corrected) 6.9 cm2     Narrative:      · Estimated left ventricular EF was in agreement with the calculated left   ventricular EF. Left ventricular ejection fraction appears to be 61 - 65%.   Left ventricular systolic function is normal.  · The right atrial cavity is moderately dilated.  · The study is technically difficult for diagnosis.  · The quality of the study is limited due to Subcostal windows only for   cardiac imaging..       SCANNED - TELEMETRY   [[id number]] Resulted: 03/13/22     Updated: 03/14/22 0802    SCANNED - TELEMETRY   [[id number]] Resulted: 03/13/22      Updated: 03/14/22 0821    SCANNED - TELEMETRY   [[id number]] Resulted: 03/13/22     Updated: 03/14/22 0837    SCANNED - TELEMETRY   [[id number]] Resulted: 03/13/22     Updated: 03/14/22 1147    SCANNED - TELEMETRY   [[id number]] Resulted: 03/13/22     Updated: 03/14/22 1155    SCANNED - TELEMETRY   [[id number]] Resulted: 03/13/22     Updated: 03/14/22 1214    SCANNED - TELEMETRY   [[id number]] Resulted: 03/13/22     Updated: 03/14/22 1317            Medication Review:   I have reviewed the patient's current medication list  Scheduled Meds:aluminum sulfate-calcium acetate 1:20, 500 mL, Topical, Q8H  carvedilol, 3.125 mg, Oral, BID With Meals  cefTRIAXone, 1 g, Intravenous, Q24H  enoxaparin, 40 mg, Subcutaneous, Q24H  hydrocortisone-bacitracin-zinc oxide-nystatin, 1 application, Topical, TID  ipratropium-albuterol, 3 mL, Nebulization, 4x Daily - RT  LORazepam, 0.5 mg, Intravenous, Q8H  midodrine, 10 mg, Oral, TID AC  multivitamin, 1 tablet, Oral, Daily  nystatin, , Topical, Q12H  pantoprazole, 40 mg, Oral, Q AM  sodium chloride, 10 mL, Intravenous, Q12H  [START ON 3/15/2022] thiamine, 100 mg, Oral, Daily  traZODone, 25 mg, Oral, Nightly      Continuous Infusions:   PRN Meds:.•  albuterol  •  LORazepam **OR** LORazepam **OR** LORazepam **OR** LORazepam **OR** LORazepam **OR** LORazepam  •  sodium chloride    Imaging:  Imaging Results (Last 72 Hours)     Procedure Component Value Units Date/Time    XR Chest 1 View [[id number]] Collected: 03/13/22 1000     Updated: 03/13/22 1004    Narrative:         DATE OF EXAM:   3/13/2022 6:16 AM     PROCEDURE:   XR CHEST 1 VW-     INDICATIONS:   COPD and CHF     COMPARISON:  No Comparisons Available     TECHNIQUE:   Portable Chest     FINDINGS:      Study limited by overlying support and monitoring apparatus     Lungs are hyperexpanded with advanced emphysematous changes noted. This  is somewhat asymmetric on the left. Scarring noted at the left apex. The  heart size is within normal  "limits. There is evidence of old  granulomatous disease. No definite pneumothorax noted. Scarring is noted  at the right apex. Osseous structures demonstrate no acute abnormality        Impression:      IMPRESSION :   Hyperexpansion of the lungs compatible with severe COPD, emphysema.     Patchy opacity at the left apex may represent scarring although  pneumonia cannot be excluded.     Electronically Signed By-Jeanmarie López On:3/13/2022 10:02 AM  This report was finalized on [id number] by  Jeanmarie López, .            NAVNEET Taylor  03/14/22  13:46 EDT       EMR Dragon/Transcription:   \"Dictated utilizing Dragon dictation\".       Electronically signed by NAVNEET Taylor, 03/14/22, 1:46 PM EDT.    "

## 2022-03-15 LAB
ALBUMIN SERPL-MCNC: 1.8 G/DL (ref 3.5–5.2)
ANION GAP SERPL CALCULATED.3IONS-SCNC: 4 MMOL/L (ref 5–15)
BASOPHILS # BLD AUTO: 0 10*3/MM3 (ref 0–0.2)
BASOPHILS NFR BLD AUTO: 0.2 % (ref 0–1.5)
BUN SERPL-MCNC: 11 MG/DL (ref 8–23)
BUN/CREAT SERPL: 20.8 (ref 7–25)
CALCIUM SPEC-SCNC: 7.8 MG/DL (ref 8.6–10.5)
CHLORIDE SERPL-SCNC: 88 MMOL/L (ref 98–107)
CO2 SERPL-SCNC: 30 MMOL/L (ref 22–29)
CREAT SERPL-MCNC: 0.53 MG/DL (ref 0.57–1)
DEPRECATED RDW RBC AUTO: 62.1 FL (ref 37–54)
EGFRCR SERPLBLD CKD-EPI 2021: 102.8 ML/MIN/1.73
EOSINOPHIL # BLD AUTO: 0.2 10*3/MM3 (ref 0–0.4)
EOSINOPHIL NFR BLD AUTO: 6 % (ref 0.3–6.2)
ERYTHROCYTE [DISTWIDTH] IN BLOOD BY AUTOMATED COUNT: 17.7 % (ref 12.3–15.4)
GLUCOSE SERPL-MCNC: 102 MG/DL (ref 65–99)
HCT VFR BLD AUTO: 27.2 % (ref 34–46.6)
HGB BLD-MCNC: 8.8 G/DL (ref 12–15.9)
LYMPHOCYTES # BLD AUTO: 0.6 10*3/MM3 (ref 0.7–3.1)
LYMPHOCYTES NFR BLD AUTO: 20.6 % (ref 19.6–45.3)
MAGNESIUM SERPL-MCNC: 1.4 MG/DL (ref 1.6–2.4)
MCH RBC QN AUTO: 32.6 PG (ref 26.6–33)
MCHC RBC AUTO-ENTMCNC: 32.5 G/DL (ref 31.5–35.7)
MCV RBC AUTO: 100.4 FL (ref 79–97)
MONOCYTES # BLD AUTO: 0.2 10*3/MM3 (ref 0.1–0.9)
MONOCYTES NFR BLD AUTO: 7 % (ref 5–12)
NEUTROPHILS NFR BLD AUTO: 2 10*3/MM3 (ref 1.7–7)
NEUTROPHILS NFR BLD AUTO: 66.2 % (ref 42.7–76)
NRBC BLD AUTO-RTO: 0.2 /100 WBC (ref 0–0.2)
PHOSPHATE SERPL-MCNC: 1.8 MG/DL (ref 2.5–4.5)
PLATELET # BLD AUTO: 72 10*3/MM3 (ref 140–450)
PMV BLD AUTO: 7.9 FL (ref 6–12)
POTASSIUM SERPL-SCNC: 4.8 MMOL/L (ref 3.5–5.2)
RBC # BLD AUTO: 2.71 10*6/MM3 (ref 3.77–5.28)
SODIUM SERPL-SCNC: 122 MMOL/L (ref 136–145)
URATE SERPL-MCNC: 5.4 MG/DL (ref 2.4–5.7)
WBC NRBC COR # BLD: 3 10*3/MM3 (ref 3.4–10.8)

## 2022-03-15 PROCEDURE — 80069 RENAL FUNCTION PANEL: CPT | Performed by: INTERNAL MEDICINE

## 2022-03-15 PROCEDURE — 99232 SBSQ HOSP IP/OBS MODERATE 35: CPT | Performed by: INTERNAL MEDICINE

## 2022-03-15 PROCEDURE — 94799 UNLISTED PULMONARY SVC/PX: CPT

## 2022-03-15 PROCEDURE — 25010000002 ENOXAPARIN PER 10 MG: Performed by: INTERNAL MEDICINE

## 2022-03-15 PROCEDURE — 25010000002 CEFTRIAXONE SODIUM-DEXTROSE 1-3.74 GM-%(50ML) RECONSTITUTED SOLUTION: Performed by: HOSPITALIST

## 2022-03-15 PROCEDURE — 83735 ASSAY OF MAGNESIUM: CPT | Performed by: HOSPITALIST

## 2022-03-15 PROCEDURE — 84550 ASSAY OF BLOOD/URIC ACID: CPT | Performed by: INTERNAL MEDICINE

## 2022-03-15 PROCEDURE — 25010000002 MAGNESIUM SULFATE 2 GM/50ML SOLUTION: Performed by: INTERNAL MEDICINE

## 2022-03-15 PROCEDURE — 99232 SBSQ HOSP IP/OBS MODERATE 35: CPT | Performed by: HOSPITALIST

## 2022-03-15 PROCEDURE — 85025 COMPLETE CBC W/AUTO DIFF WBC: CPT | Performed by: HOSPITALIST

## 2022-03-15 RX ORDER — MAGNESIUM SULFATE HEPTAHYDRATE 40 MG/ML
4 INJECTION, SOLUTION INTRAVENOUS AS NEEDED
Status: DISCONTINUED | OUTPATIENT
Start: 2022-03-15 | End: 2022-03-18 | Stop reason: HOSPADM

## 2022-03-15 RX ORDER — MAGNESIUM SULFATE HEPTAHYDRATE 40 MG/ML
2 INJECTION, SOLUTION INTRAVENOUS AS NEEDED
Status: DISCONTINUED | OUTPATIENT
Start: 2022-03-15 | End: 2022-03-18 | Stop reason: HOSPADM

## 2022-03-15 RX ORDER — POTASSIUM CHLORIDE 20 MEQ/1
40 TABLET, EXTENDED RELEASE ORAL AS NEEDED
Status: DISCONTINUED | OUTPATIENT
Start: 2022-03-15 | End: 2022-03-18 | Stop reason: HOSPADM

## 2022-03-15 RX ORDER — POTASSIUM CHLORIDE 1.5 G/1.77G
40 POWDER, FOR SOLUTION ORAL AS NEEDED
Status: DISCONTINUED | OUTPATIENT
Start: 2022-03-15 | End: 2022-03-18 | Stop reason: HOSPADM

## 2022-03-15 RX ORDER — LORAZEPAM 0.5 MG/1
0.5 TABLET ORAL EVERY 6 HOURS
Status: DISPENSED | OUTPATIENT
Start: 2022-03-15 | End: 2022-03-16

## 2022-03-15 RX ORDER — LORAZEPAM 0.5 MG/1
0.5 TABLET ORAL EVERY 8 HOURS
Status: DISPENSED | OUTPATIENT
Start: 2022-03-16 | End: 2022-03-17

## 2022-03-15 RX ORDER — MAGNESIUM SULFATE HEPTAHYDRATE 40 MG/ML
2 INJECTION, SOLUTION INTRAVENOUS ONCE
Status: COMPLETED | OUTPATIENT
Start: 2022-03-15 | End: 2022-03-15

## 2022-03-15 RX ORDER — CEFTRIAXONE 1 G/50ML
1 INJECTION, SOLUTION INTRAVENOUS EVERY 24 HOURS
Status: DISCONTINUED | OUTPATIENT
Start: 2022-03-16 | End: 2022-03-18 | Stop reason: HOSPADM

## 2022-03-15 RX ORDER — POTASSIUM CHLORIDE 7.45 MG/ML
10 INJECTION INTRAVENOUS
Status: DISCONTINUED | OUTPATIENT
Start: 2022-03-15 | End: 2022-03-18 | Stop reason: HOSPADM

## 2022-03-15 RX ADMIN — Medication 100 MG: at 09:33

## 2022-03-15 RX ADMIN — PANTOPRAZOLE SODIUM 40 MG: 40 TABLET, DELAYED RELEASE ORAL at 05:34

## 2022-03-15 RX ADMIN — HYDROCORTISONE 1 APPLICATION: 1 OINTMENT TOPICAL at 17:23

## 2022-03-15 RX ADMIN — HYDROCORTISONE 1 APPLICATION: 1 OINTMENT TOPICAL at 22:56

## 2022-03-15 RX ADMIN — CALCIUM ACETATE MONOHYDRATE AND ALUMINUM SULFATE TETRADECAHYDRATE 500 ML: 952; 1347 POWDER, FOR SOLUTION TOPICAL at 05:39

## 2022-03-15 RX ADMIN — Medication 10 ML: at 21:00

## 2022-03-15 RX ADMIN — SODIUM CHLORIDE 125 ML/HR: 9 INJECTION, SOLUTION INTRAVENOUS at 05:34

## 2022-03-15 RX ADMIN — ENOXAPARIN SODIUM 40 MG: 40 INJECTION SUBCUTANEOUS at 17:24

## 2022-03-15 RX ADMIN — NYSTATIN: 100000 POWDER TOPICAL at 17:23

## 2022-03-15 RX ADMIN — CALCIUM ACETATE MONOHYDRATE AND ALUMINUM SULFATE TETRADECAHYDRATE 500 ML: 952; 1347 POWDER, FOR SOLUTION TOPICAL at 22:56

## 2022-03-15 RX ADMIN — NYSTATIN: 100000 POWDER TOPICAL at 05:39

## 2022-03-15 RX ADMIN — CARVEDILOL 3.12 MG: 3.12 TABLET, FILM COATED ORAL at 09:33

## 2022-03-15 RX ADMIN — SODIUM PHOSPHATE, MONOBASIC, MONOHYDRATE 10 MMOL: 276; 142 INJECTION, SOLUTION INTRAVENOUS at 14:11

## 2022-03-15 RX ADMIN — HYDROCORTISONE 1 APPLICATION: 1 OINTMENT TOPICAL at 09:35

## 2022-03-15 RX ADMIN — IPRATROPIUM BROMIDE AND ALBUTEROL SULFATE 3 ML: .5; 3 SOLUTION RESPIRATORY (INHALATION) at 17:13

## 2022-03-15 RX ADMIN — IPRATROPIUM BROMIDE AND ALBUTEROL SULFATE 3 ML: .5; 3 SOLUTION RESPIRATORY (INHALATION) at 11:25

## 2022-03-15 RX ADMIN — MAGNESIUM SULFATE HEPTAHYDRATE 2 G: 2 INJECTION, SOLUTION INTRAVENOUS at 13:00

## 2022-03-15 RX ADMIN — THERA TABS 1 TABLET: TAB at 09:30

## 2022-03-15 RX ADMIN — MIDODRINE HYDROCHLORIDE 10 MG: 5 TABLET ORAL at 17:23

## 2022-03-15 RX ADMIN — IPRATROPIUM BROMIDE AND ALBUTEROL SULFATE 3 ML: .5; 3 SOLUTION RESPIRATORY (INHALATION) at 15:47

## 2022-03-15 RX ADMIN — CALCIUM ACETATE MONOHYDRATE AND ALUMINUM SULFATE TETRADECAHYDRATE 500 ML: 952; 1347 POWDER, FOR SOLUTION TOPICAL at 14:11

## 2022-03-15 RX ADMIN — IPRATROPIUM BROMIDE AND ALBUTEROL SULFATE 3 ML: .5; 3 SOLUTION RESPIRATORY (INHALATION) at 07:25

## 2022-03-15 RX ADMIN — MIDODRINE HYDROCHLORIDE 10 MG: 5 TABLET ORAL at 13:00

## 2022-03-15 RX ADMIN — Medication 10 ML: at 09:37

## 2022-03-15 RX ADMIN — CEFTRIAXONE 1 G: 1 INJECTION, SOLUTION INTRAVENOUS at 05:38

## 2022-03-15 RX ADMIN — MIDODRINE HYDROCHLORIDE 10 MG: 5 TABLET ORAL at 09:30

## 2022-03-15 NOTE — SIGNIFICANT NOTE
RN remains in room at bedside monitoring patient after midodrine administered secondary to low blood pressure.

## 2022-03-15 NOTE — PLAN OF CARE
Problem: Adult Inpatient Plan of Care  Goal: Plan of Care Review  Outcome: Ongoing, Progressing  Goal: Patient-Specific Goal (Individualized)  Outcome: Ongoing, Progressing  Goal: Absence of Hospital-Acquired Illness or Injury  Outcome: Ongoing, Progressing  Intervention: Identify and Manage Fall Risk  Recent Flowsheet Documentation  Taken 3/15/2022 1600 by Rayna Black RN  Safety Promotion/Fall Prevention:   safety round/check completed   room organization consistent   mobility aid in reach   fall prevention program maintained   clutter free environment maintained   assistive device/personal items within reach  Taken 3/15/2022 1400 by Rayna Black RN  Safety Promotion/Fall Prevention:   fall prevention program maintained   assistive device/personal items within reach   activity supervised   lighting adjusted   room organization consistent   safety round/check completed  Taken 3/15/2022 1200 by Rayna Black RN  Safety Promotion/Fall Prevention:   safety round/check completed   lighting adjusted   fall prevention program maintained   clutter free environment maintained   assistive device/personal items within reach  Taken 3/15/2022 1000 by Rayna Black RN  Safety Promotion/Fall Prevention:   activity supervised   clutter free environment maintained   fall prevention program maintained   safety round/check completed  Intervention: Prevent Skin Injury  Recent Flowsheet Documentation  Taken 3/15/2022 0900 by Rayna Black RN  Skin Protection: adhesive use limited  Intervention: Prevent and Manage VTE (Venous Thromboembolism) Risk  Recent Flowsheet Documentation  Taken 3/15/2022 1200 by Rayna Black RN  VTE Prevention/Management: (sub q heparin) --  Range of Motion:   active ROM (range of motion) encouraged   ROM (range of motion) performed  Taken 3/15/2022 0900 by Rayna Black RN  Range of Motion:   active ROM (range of motion) encouraged   ROM (range of motion)  performed  Intervention: Prevent Infection  Recent Flowsheet Documentation  Taken 3/15/2022 1600 by Rayna Black RN  Infection Prevention:   visitors restricted/screened   hand hygiene promoted   equipment surfaces disinfected   environmental surveillance performed  Taken 3/15/2022 1400 by Rayna Black RN  Infection Prevention:   hand hygiene promoted   rest/sleep promoted   single patient room provided   visitors restricted/screened  Goal: Optimal Comfort and Wellbeing  Outcome: Ongoing, Progressing  Intervention: Provide Person-Centered Care  Recent Flowsheet Documentation  Taken 3/15/2022 1600 by Rayna Black RN  Trust Relationship/Rapport:   care explained   emotional support provided   questions answered   questions encouraged   thoughts/feelings acknowledged   reassurance provided  Taken 3/15/2022 1200 by Rayna Black RN  Trust Relationship/Rapport:   care explained   choices provided   emotional support provided   questions answered   reassurance provided   thoughts/feelings acknowledged  Taken 3/15/2022 0900 by Rayna Black RN  Trust Relationship/Rapport:   care explained   choices provided   questions answered   reassurance provided   thoughts/feelings acknowledged  Goal: Readiness for Transition of Care  Outcome: Ongoing, Progressing     Problem: Fall Injury Risk  Goal: Absence of Fall and Fall-Related Injury  Outcome: Ongoing, Progressing  Intervention: Identify and Manage Contributors  Recent Flowsheet Documentation  Taken 3/15/2022 1600 by Rayna Black RN  Medication Review/Management: medications reviewed  Taken 3/15/2022 1400 by Rayna Black RN  Medication Review/Management: medications reviewed  Taken 3/15/2022 1200 by Rayna Black RN  Medication Review/Management: medications reviewed  Taken 3/15/2022 1000 by Rayna Black RN  Medication Review/Management: medications reviewed  Taken 3/15/2022 0900 by Rayna Black RN  Medication Review/Management:  (ensure alarms in place while in bed and in chair. ensure call light and personal and room phone within reach of patient.)   medications reviewed   other (see comments)  Self-Care Promotion:   independence encouraged   BADL personal objects within reach   meal set-up provided  Intervention: Promote Injury-Free Environment  Recent Flowsheet Documentation  Taken 3/15/2022 1600 by Rayna Black RN  Safety Promotion/Fall Prevention:   safety round/check completed   room organization consistent   mobility aid in reach   fall prevention program maintained   clutter free environment maintained   assistive device/personal items within reach  Taken 3/15/2022 1400 by Rayna Black RN  Safety Promotion/Fall Prevention:   fall prevention program maintained   assistive device/personal items within reach   activity supervised   lighting adjusted   room organization consistent   safety round/check completed  Taken 3/15/2022 1200 by Rayna Black RN  Safety Promotion/Fall Prevention:   safety round/check completed   lighting adjusted   fall prevention program maintained   clutter free environment maintained   assistive device/personal items within reach  Taken 3/15/2022 1000 by Rayna Black RN  Safety Promotion/Fall Prevention:   activity supervised   clutter free environment maintained   fall prevention program maintained   safety round/check completed     Problem: Skin Injury Risk Increased  Goal: Skin Health and Integrity  Outcome: Ongoing, Progressing  Intervention: Promote and Optimize Oral Intake  Recent Flowsheet Documentation  Taken 3/15/2022 0900 by Rayna Black RN  Oral Nutrition Promotion: (1200 ml fluids restriction ordered.)   calorie-dense liquids provided   rest periods promoted   other (see comments)  Intervention: Optimize Skin Protection  Recent Flowsheet Documentation  Taken 3/15/2022 0900 by Rayna Black RN  Pressure Reduction Techniques: frequent weight shift encouraged  Pressure  Reduction Devices:   pressure-redistributing mattress utilized   specialty bed utilized   heel offloading device utilized   positioning supports utilized  Skin Protection: adhesive use limited     Problem: Malnutrition  Goal: Improved Nutritional Intake  Outcome: Ongoing, Progressing  Intervention: Promote and Optimize Oral Intake  Recent Flowsheet Documentation  Taken 3/15/2022 0900 by Rayna Black, RN  Oral Nutrition Promotion: (1200 ml fluids restriction ordered.)   calorie-dense liquids provided   rest periods promoted   other (see comments)   Goal Outcome Evaluation:

## 2022-03-15 NOTE — PROGRESS NOTES
"          Cardiology Progress  Note      Patient Care Team:  Provider, No Known as PCP - General    PATIENT IDENTIFICATION  Name: Shanelle Solis  Age: 67 y.o.  Sex: female  :  1955  MRN: [id number]             REASON FOR FOLLOW-UP:  Fluid overload/congestive heart failure        SUBJECTIVE    Patient seen and examined, chart and labs reviewed.  Patient lying in bed eating grapes, normal respiratory effort.  She reports that she feels \"pretty good\".  Receiving fluids at 125/hour.  Rhythm sinus at 77.  She denies any chest discomfort, palpitations, nausea or vomiting.  Blood pressure still soft with systolic readings 80s-90s.      REVIEW OF SYSTEMS:  Pertinent items are noted in HPI, all other systems reviewed and negative    OBJECTIVE       ASSESSMENT  Acute heart failure with preserved ejection fraction  Hyponatremia  Hyper kalemia  Altered mental status-resolved  Hypotension/tachycardia-improved  Lower extremity cellulitis  Sepsis with lactic acidosis  EtOH abuse with cirrhosis  COPD  GERD  History of breast cancer  Tobacco dependence disorder  Generalized weakness          RECOMMENDATIONS  TTE with EF 61-65%, RA mod dilated, technically difficult study, valvular structures not well visualized.  No evidence of pericardial effusion.  Nephrology has been consulted.  CHF protocol initiated with fluid restriction, cardiac diet, IV Lasix 40 mg daily.  Receiving carvedilol 3.125 mg every 12  Patient receiving empiric antibiotic coverage for cellulitis  CIWA protocol initiated  Monitor renal function and electrolytes and replace as needed    March 15, 2022  Diastolic heart failure  Heart failure with preserved ejection fraction  Electrolyte abnormalities  Altered mental status  Lactic acidosis  Alcohol abuse  Frail status  Continue supportive care  Discussed with the patient at bedside    Chart and labs reviewed.  History and exam findings are verified with above changes noted.  Assessment and plan notated by APC " "after being formulated by attending consultant.  Note that greater than 50% of the time spent in care of the patient was provided by attending consultant.        Vital Signs  Visit Vitals  BP (!) 80/57   Pulse 69   Temp 98 °F (36.7 °C) (Oral)   Resp 16   Ht 165.1 cm (65\")   Wt 53.1 kg (117 lb)   SpO2 97%   BMI 19.47 kg/m²     Oxygen Therapy  SpO2: 97 %  Pulse Oximetry Type: Continuous  Device (Oxygen Therapy): nasal cannula  Flow (L/min): 4  Oxygen Concentration (%): 36  Flowsheet Rows    Flowsheet Row First Filed Value   Admission Height 165.1 cm (65\") Documented at 03/13/2022 0142   Admission Weight 53.2 kg (117 lb 4.6 oz) Documented at 03/13/2022 0142        Intake & Output (last 3 days)       03/12 0701  03/13 0700 03/13 0701  03/14 0700 03/14 0701  03/15 0700 03/15 0701  03/16 0700    P.O.  100 630     IV Piggyback  100      Total Intake(mL/kg)  200 (3.8) 630 (11.9)     Urine (mL/kg/hr)  200 (0.2)      Total Output  200      Net  0 +630                 Lines, Drains & Airways     Active LDAs     Name Placement date Placement time Site Days    Peripheral IV 03/13/22 0400 Anterior;Right Forearm 03/13/22  0400  placed from other hospital  Forearm  1    External Urinary Catheter 03/13/22  1157  --  1                       BP (!) 80/57   Pulse 69   Temp 98 °F (36.7 °C) (Oral)   Resp 16   Ht 165.1 cm (65\")   Wt 53.1 kg (117 lb)   SpO2 97%   BMI 19.47 kg/m²   Intake/Output last 3 shifts:  I/O last 3 completed shifts:  In: 730 [P.O.:630; IV Piggyback:100]  Out: 200 [Urine:200]  Intake/Output this shift:  No intake/output data recorded.    PHYSICAL EXAM:  General:          Well-developed, thin 67-year-old female who is alert, cooperative, no distress, appears stated age  Head:              Normocephalic, atraumatic, mucous membranes moist  Eyes:               Conjunctiva/corneas clear, EOM's intact     Neck:              Supple,  no bruit  Lungs:            Very coarse to auscultation with expiratory wheezes " bilaterally, decreased airflow.  Chest wall:     No tenderness  Heart::             Regular rate and rhythm, S1 and S2 normal, 1/6 holosystolic murmur.  No rub or gallop  Abdomen:      Soft, non-tender, nondistended bowel sounds active  Extremities:    Trace edema to lower extremities  Pulses:           Diminished pedal pulses.  Skin:                No rashes or lesions  Neuro/psych: A&O x3. CN II through XII are grossly intact with flat affect      Scheduled Meds:      aluminum sulfate-calcium acetate 1:20, 500 mL, Topical, Q8H  carvedilol, 3.125 mg, Oral, BID With Meals  [START ON 3/16/2022] cefTRIAXone Sodium-Dextrose, 1 g, Intravenous, Q24H  enoxaparin, 40 mg, Subcutaneous, Q24H  hydrocortisone-bacitracin-zinc oxide-nystatin, 1 application, Topical, TID  ipratropium-albuterol, 3 mL, Nebulization, 4x Daily - RT  LORazepam, 0.5 mg, Oral, Q6H   Followed by  [START ON 3/16/2022] LORazepam, 0.5 mg, Oral, Q8H  midodrine, 10 mg, Oral, TID AC  multivitamin, 1 tablet, Oral, Daily  nystatin, , Topical, Q12H  pantoprazole, 40 mg, Oral, Q AM  sodium chloride, 10 mL, Intravenous, Q12H  sodium phosphate IVPB, 10 mmol, Intravenous, Once  thiamine, 100 mg, Oral, Daily        Continuous Infusions:    sodium chloride, 125 mL/hr, Last Rate: 125 mL/hr (03/15/22 0534)        PRN Meds:    •  albuterol  •  magnesium sulfate **OR** magnesium sulfate **OR** magnesium sulfate  •  potassium & sodium phosphates **OR** potassium & sodium phosphates  •  potassium chloride **OR** potassium chloride **OR** potassium chloride  •  sodium chloride        Results Review:     I reviewed the patient's new clinical results.    CBC    Results from last 7 days   Lab Units 03/15/22  0805 03/14/22  1236 03/14/22  0521 03/13/22  0631   WBC 10*3/mm3 3.00* 5.80 5.30 10.20   HEMOGLOBIN g/dL 8.8* 9.9* 9.4* 11.1*   PLATELETS 10*3/mm3 72* 84* 77* 114*     Cr Clearance Estimated Creatinine Clearance: 58.8 mL/min (A) (by C-G formula based on SCr of 0.53 mg/dL  (L)).  Coag   Results from last 7 days   Lab Units 03/13/22  0631   INR  1.21*     HbA1C No results found for: HGBA1C  Blood Glucose   Glucose   Date/Time Value Ref Range Status   03/14/2022 1446 127 (H) 70 - 105 mg/dL Final     Comment:     Serial Number: [id number]:  [id number]     Infection   Results from last 7 days   Lab Units 03/13/22  0635 03/13/22  0631   BLOODCX  No growth at 2 days No growth at 2 days     CMP   Results from last 7 days   Lab Units 03/15/22  0805 03/14/22  1236 03/14/22  0521 03/13/22  2146 03/13/22  1702 03/13/22  0631   SODIUM mmol/L 122* 122* 122* 120* 119* 116*   POTASSIUM mmol/L 4.8 4.7 4.7 4.8 5.1  5.1 6.1*   CHLORIDE mmol/L 88* 84* 83* 80* 80* 77*   CO2 mmol/L 30.0* 36.0* 39.0* 35.0* 36.0* 35.0*   BUN mg/dL 11 11 11 10 11 10   CREATININE mg/dL 0.53* 0.59 0.51* 0.61 0.61 0.62   GLUCOSE mg/dL 102* 127* 101* 103* 140* 115*   ALBUMIN g/dL 1.80*  --  2.00* 2.10* 2.10* 2.40*   BILIRUBIN mg/dL  --   --   --   --   --  2.9*   ALK PHOS U/L  --   --   --   --   --  137*   AST (SGOT) U/L  --   --   --   --   --  58*   ALT (SGPT) U/L  --   --   --   --   --  64*   AMMONIA umol/L  --   --  20  --   --   --      ABG    Results from last 7 days   Lab Units 03/14/22  1454 03/13/22  0459   PH, ARTERIAL pH units 7.357 7.391   PCO2, ARTERIAL mm Hg 70.1* 60.5*   PO2 ART mm Hg 100.9 51.5*   O2 SATURATION ART % 97.1 84.4*   BASE EXCESS ART mmol/L 11.9* 9.8*     UA      SHANICE  No results found for: POCMETH, POCAMPHET, POCBARBITUR, POCBENZO, POCCOCAINE, POCOPIATES, POCOXYCODO, POCPHENCYC, POCPROPOXY, POCTHC, POCTRICYC  Lysis Labs   Results from last 7 days   Lab Units 03/15/22  0805 03/14/22  1236 03/14/22  0521 03/13/22  2146 03/13/22  1702 03/13/22  0631   INR   --   --   --   --   --  1.21*   HEMOGLOBIN g/dL 8.8* 9.9* 9.4*  --   --  11.1*   PLATELETS 10*3/mm3 72* 84* 77*  --   --  114*   CREATININE mg/dL 0.53* 0.59 0.51* 0.61 0.61 0.62     Radiology(recent) No radiology results for the last  day      Results from last 7 days   Lab Units 03/14/22  0521 03/13/22  0631   CK TOTAL U/L 42  --    TROPONIN T ng/mL  --  <0.010       Xrays, labs reviewed personally by physician.    ECG/EMG Results (most recent)     Procedure Component Value Units Date/Time    Adult Transthoracic Echo Complete With Contrast if Necessary Per Protocol [[id number]] Resulted: 03/13/22 0929     Updated: 03/13/22 0931     Target HR (85%) 132 bpm      Max. Pred. HR (100%) 155 bpm      Ao root diam 2.16 cm      Ao pk gabriel 119.8 cm/sec      Ao V2 VTI 22.3 cm      RAS(I,D) 1.79 cm2      EDV(cubed) 24.2 ml      EDV(MOD-sp4) 29.2 ml      EF(MOD-bp) 63.0 %      EF(MOD-sp4) 62.8 %      ESV(cubed) 10.5 ml      ESV(MOD-sp4) 10.9 ml      IVS/LVPW 1.13 cm      LV mass(C)d 58.6 grams      LV V1 max PG 4.8 mmHg      LV V1 mean PG 1.97 mmHg      LV V1 max 110.1 cm/sec      LVPWd 0.78 cm      MV dec slope 340.5 cm/sec2      MV dec time 0.13 msec      MV V2 VTI 16.7 cm      MVA(VTI) 2.39 cm2      PA acc time 0.11 sec      PA pr(Accel) 29.2 mmHg      PA V2 max 118.0 cm/sec      RAP systole 3.0 mmHg      RV V1 max PG 6.6 mmHg      RV V1 max 128.3 cm/sec      RV V1 VTI 21.2 cm      RVIDd 3.1 cm      RVSP(TR) 18.3 mmHg      SI(MOD-sp4) 11.6 ml/m2      SV(LVOT) 40.0 ml      SV(MOD-sp4) 18.3 ml      SV(RVOT) 82.3 ml      TR max PG 15.3 mmHg      Ao max PG 5.7 mmHg      Ao mean PG 3.1 mmHg      FS 24.3 %      IVSd 0.88 cm      LA dimension(2D) 2.5 cm      LV V1 VTI 18.9 cm      LVIDd 2.9 cm      LVIDs 2.19 cm      LVOT area 2.11 cm2      LVOT diam 1.64 cm      MV E/A 0.70     MV max PG 3.4 mmHg      MV mean PG 1.91 mmHg      RVOT diam 2.22 cm      MV A max gabriel 64.8 cm/sec      MV E max gabriel 45.1 cm/sec      TR max gabriel 195.7 cm/sec      LV [place] Vol (BSA corrected) 18.6 cm2      LV Sys Vol (BSA corrected) 6.9 cm2     Narrative:      · Estimated left ventricular EF was in agreement with the calculated left   ventricular EF. Left ventricular ejection fraction  appears to be 61 - 65%.   Left ventricular systolic function is normal.  · The right atrial cavity is moderately dilated.  · The study is technically difficult for diagnosis.  · The quality of the study is limited due to Subcostal windows only for   cardiac imaging..       SCANNED - TELEMETRY   [[id number]] Resulted: 03/13/22     Updated: 03/14/22 0802    SCANNED - TELEMETRY   [[id number]] Resulted: 03/13/22     Updated: 03/14/22 0821    SCANNED - TELEMETRY   [[id number]] Resulted: 03/13/22     Updated: 03/14/22 0837    SCANNED - TELEMETRY   [[id number]] Resulted: 03/13/22     Updated: 03/14/22 1147    SCANNED - TELEMETRY   [[id number]] Resulted: 03/13/22     Updated: 03/14/22 1155    SCANNED - TELEMETRY   [[id number]] Resulted: 03/13/22     Updated: 03/14/22 1214    SCANNED - TELEMETRY   [[id number]] Resulted: 03/13/22     Updated: 03/14/22 1317    SCANNED - TELEMETRY   [[id number]] Resulted: 03/13/22     Updated: 03/14/22 1347    SCANNED - TELEMETRY   [[id number]] Resulted: 03/13/22     Updated: 03/14/22 1517    SCANNED - TELEMETRY   [[id number]] Resulted: 03/13/22     Updated: 03/15/22 0830    SCANNED - TELEMETRY   [[id number]] Resulted: 03/13/22     Updated: 03/15/22 0947    SCANNED - TELEMETRY   [[id number]] Resulted: 03/13/22     Updated: 03/15/22 0958    SCANNED - TELEMETRY   [[id number]] Resulted: 03/13/22     Updated: 03/15/22 1009    SCANNED - TELEMETRY   [[id number]] Resulted: 03/13/22     Updated: 03/15/22 1102            Medication Review:   I have reviewed the patient's current medication list  Scheduled Meds:aluminum sulfate-calcium acetate 1:20, 500 mL, Topical, Q8H  carvedilol, 3.125 mg, Oral, BID With Meals  [START ON 3/16/2022] cefTRIAXone Sodium-Dextrose, 1 g, Intravenous, Q24H  enoxaparin, 40 mg, Subcutaneous, Q24H  hydrocortisone-bacitracin-zinc oxide-nystatin, 1 application, Topical, TID  ipratropium-albuterol, 3 mL, Nebulization, 4x Daily - RT  LORazepam, 0.5 mg, Oral, Q6H   Followed by  [START ON  "3/16/2022] LORazepam, 0.5 mg, Oral, Q8H  midodrine, 10 mg, Oral, TID AC  multivitamin, 1 tablet, Oral, Daily  nystatin, , Topical, Q12H  pantoprazole, 40 mg, Oral, Q AM  sodium chloride, 10 mL, Intravenous, Q12H  sodium phosphate IVPB, 10 mmol, Intravenous, Once  thiamine, 100 mg, Oral, Daily      Continuous Infusions:sodium chloride, 125 mL/hr, Last Rate: 125 mL/hr (03/15/22 0534)      PRN Meds:.•  albuterol  •  magnesium sulfate **OR** magnesium sulfate **OR** magnesium sulfate  •  potassium & sodium phosphates **OR** potassium & sodium phosphates  •  potassium chloride **OR** potassium chloride **OR** potassium chloride  •  sodium chloride    Imaging:  Imaging Results (Last 72 Hours)     Procedure Component Value Units Date/Time    XR Chest 1 View [[id number]] Collected: 03/13/22 1000     Updated: 03/13/22 1004    Narrative:         DATE OF EXAM:   3/13/2022 6:16 AM     PROCEDURE:   XR CHEST 1 VW-     INDICATIONS:   COPD and CHF     COMPARISON:  No Comparisons Available     TECHNIQUE:   Portable Chest     FINDINGS:      Study limited by overlying support and monitoring apparatus     Lungs are hyperexpanded with advanced emphysematous changes noted. This  is somewhat asymmetric on the left. Scarring noted at the left apex. The  heart size is within normal limits. There is evidence of old  granulomatous disease. No definite pneumothorax noted. Scarring is noted  at the right apex. Osseous structures demonstrate no acute abnormality        Impression:      IMPRESSION :   Hyperexpansion of the lungs compatible with severe COPD, emphysema.     Patchy opacity at the left apex may represent scarring although  pneumonia cannot be excluded.     Electronically Signed By-Jeanmarie López On:3/13/2022 10:02 AM  This report was finalized on [id number] by  Jeanmarie López, .            NAVNEET Taylor  03/15/22  14:00 EDT       EMR Dragon/Transcription:   \"Dictated utilizing Dragon dictation\".       Electronically " signed by NAVNEET Taylor, 03/15/22, 2:00 PM EDT.

## 2022-03-15 NOTE — PROGRESS NOTES
[facility] Medicine Services Daily Progress Note    Patient Name: Shanelle Solis  : 1955  MRN: [id number]  Primary Care Physician:  Provider, No Known  Date of admission: 3/13/2022      Subjective      Chief Complaint: None      Patient Reports:    3/13/22: Patient poor historian.  Given Ativan for CIWA.  Nephrology and cardiology consulted.    3/14/22: Low BP.  On midodrine.  Afebrile.  Wound care consulted.  ABG ordered in the afternoon to evaluate confusion.  Likely chronic CO2 retention.    3/15/22: No complaints.  Able to answer questions.  Will replace electrolytes.    Review of Systems   All other systems reviewed and are negative.         Objective      Vitals:   Temp:  [97.7 °F (36.5 °C)-98.7 °F (37.1 °C)] 98 °F (36.7 °C)  Heart Rate:  [68-89] 69  Resp:  [16-20] 16  BP: ()/() 101/41  Flow (L/min):  [3-4] 4    Physical Exam  HENT:      Head: Normocephalic.      Mouth/Throat:      Mouth: Mucous membranes are moist.   Eyes:      General: No scleral icterus.     Extraocular Movements: Extraocular movements intact.      Pupils: Pupils are equal, round, and reactive to light.   Cardiovascular:      Rate and Rhythm: Normal rate.   Pulmonary:      Effort: Pulmonary effort is normal.   Abdominal:      General: Bowel sounds are normal.   Musculoskeletal:      Cervical back: Normal range of motion.      Comments: Erythema of thighs, posterior thigh and lower abdomen   Neurological:      Mental Status: She is alert.             Result Review    Result Review:  I have personally reviewed the results from the time of this admission to 3/15/2022 12:33 EDT and agree with these findings:  [x]  Laboratory  []  Microbiology  [x]  Radiology  []  EKG/Telemetry   []  Cardiology/Vascular   []  Pathology  [x]  Old records  []  Other:      Wounds (last 24 hours)     LDA Wound     Row Name 03/15/22 0400 03/15/22 0000 22 2000       Wound 22 0506 Left gluteal Pressure Injury     Wound - Properties Group Placement Date: 03/13/22  -ER Placement Time: 0506  -ER Side: Left  -ER Location: gluteal  -ER Primary Wound Type: Pressure inj  -ER    Closure Open to air  -SS Open to air  -SS Open to air  -SS    Base non-blanchable;dry;red  -SS non-blanchable;dry;red  -SS non-blanchable;dry;red  -SS    Periwound intact;blanchable;dry;warm  -SS intact;blanchable;dry;warm  -SS intact;blanchable;dry;warm  -SS    Periwound Temperature warm  -SS warm  -SS warm  -SS    Dressing Care -- -- open to air  -SS    Periwound Care -- -- barrier ointment applied;dry periwound area maintained  -SS    Retired Wound - Properties Group Placement Date: 03/13/22  -ER Placement Time: 0506  -ER Side: Left  -ER Location: gluteal  -ER Primary Wound Type: Pressure inj  -ER    Retired Wound - Properties Group Date first assessed: 03/13/22  -ER Time first assessed: 0506  -ER Side: Left  -ER Location: gluteal  -ER Primary Wound Type: Pressure inj  -ER       Wound 03/13/22 0507 Right gluteal Pressure Injury    Wound - Properties Group Placement Date: 03/13/22  -ER Placement Time: 0507  -ER Side: Right  -ER Location: gluteal  -ER Primary Wound Type: Pressure inj  -ER    Closure Open to air  -SS Open to air  -SS Open to air  -SS    Base non-blanchable;moist  -SS non-blanchable;moist  -SS non-blanchable;moist  -SS    Periwound intact;blanchable;warm  -SS intact;blanchable;warm  -SS intact;blanchable;warm  -SS    Periwound Temperature warm  -SS warm  -SS warm  -SS    Dressing Care -- -- open to air  -SS    Retired Wound - Properties Group Placement Date: 03/13/22  -ER Placement Time: 0507  -ER Side: Right  -ER Location: gluteal  -ER Primary Wound Type: Pressure inj  -ER    Retired Wound - Properties Group Date first assessed: 03/13/22  -ER Time first assessed: 0507  -ER Side: Right  -ER Location: gluteal  -ER Primary Wound Type: Pressure inj  -ER       Wound 03/13/22 0524 Right proximal arm    Wound - Properties Group Placement Date:  03/13/22  -ER Placement Time: 0524  -ER Side: Right  -ER Orientation: proximal  -ER Location: arm  -ER    Dressing Appearance -- -- open to air  -SS    Closure Open to air  -SS Open to air  -SS Open to air  -SS    Base non-blanchable  -SS non-blanchable  -SS non-blanchable  -SS    Periwound intact;blanchable;dry;warm  -SS intact;blanchable;dry;warm  -SS intact;blanchable;dry;warm  -SS    Periwound Temperature warm  -SS warm  -SS warm  -SS    Retired Wound - Properties Group Placement Date: 03/13/22  -ER Placement Time: 0524  -ER Side: Right  -ER Orientation: proximal  -ER Location: arm  -ER    Retired Wound - Properties Group Date first assessed: 03/13/22  -ER Time first assessed: 0524  -ER Side: Right  -ER Location: arm  -ER       Wound 03/13/22 0528 Left lower leg Abrasion    Wound - Properties Group Placement Date: 03/13/22  -ER Placement Time: 0528  -ER Side: Left  -ER Orientation: lower  -ER Location: leg  -ER Primary Wound Type: Abrasion  -ER    Dressing Appearance -- -- open to air  -SS    Closure Open to air  -SS Open to air  -SS Open to air  -SS    Base scab  -SS scab  -SS scab  -SS    Periwound intact;blanchable;dry;warm  -SS intact;blanchable;dry;warm  -SS intact;blanchable;dry;warm  -SS    Periwound Temperature warm  -SS warm  -SS warm  -SS    Dressing Care -- -- open to air  -SS    Periwound Care -- -- dry periwound area maintained  -SS    Retired Wound - Properties Group Placement Date: 03/13/22  -ER Placement Time: 0528  -ER Side: Left  -ER Orientation: lower  -ER Location: leg  -ER Primary Wound Type: Abrasion  -ER    Retired Wound - Properties Group Date first assessed: 03/13/22  -ER Time first assessed: 0528  -ER Side: Left  -ER Location: leg  -ER Primary Wound Type: Abrasion  -ER       Rash 03/13/22 0510 groin    Rash - Properties Group Placement Date: 03/13/22  -ER Placement Time: 0510  -ER Location: groin  -ER    Color -- -- red  -SS    Characteristics crusted;dry;peeling  -SS  crusted;dry;peeling  -SS crusted;dry;peeling  -SS    Care, Rash -- -- cleansed with;soap and water;open to air  -SS    Retired Rash - Properties Group Placement Date: 03/13/22  -ER Placement Time: 0510  -ER Location: groin  -ER    Retired Rash - Properties Group Date first assessed: 03/13/22  -ER Time first assessed: 0510  -ER Location: groin  -ER    Row Name 03/14/22 1605             Wound 03/13/22 0506 Left gluteal Pressure Injury    Wound - Properties Group Placement Date: 03/13/22  -ER Placement Time: 0506  -ER Side: Left  -ER Location: gluteal  -ER Primary Wound Type: Pressure inj  -ER      Closure Open to air  -DB      Periwound non-blanchable;redness;excoriated  -DB      Periwound Temperature warm  -DB      Periwound Skin Turgor soft  -DB      Edges open  -DB      Retired Wound - Properties Group Placement Date: 03/13/22  -ER Placement Time: 0506  -ER Side: Left  -ER Location: gluteal  -ER Primary Wound Type: Pressure inj  -ER      Retired Wound - Properties Group Date first assessed: 03/13/22  -ER Time first assessed: 0506  -ER Side: Left  -ER Location: gluteal  -ER Primary Wound Type: Pressure inj  -ER              Wound 03/13/22 0507 Right gluteal Pressure Injury    Wound - Properties Group Placement Date: 03/13/22  -ER Placement Time: 0507  -ER Side: Right  -ER Location: gluteal  -ER Primary Wound Type: Pressure inj  -ER      Closure Open to air  -DB      Base moist;non-blanchable  -DB      Periwound non-blanchable;redness  -DB      Periwound Temperature warm  -DB      Periwound Skin Turgor soft  -DB      Retired Wound - Properties Group Placement Date: 03/13/22  -ER Placement Time: 0507  -ER Side: Right  -ER Location: gluteal  -ER Primary Wound Type: Pressure inj  -ER      Retired Wound - Properties Group Date first assessed: 03/13/22  -ER Time first assessed: 0507  -ER Side: Right  -ER Location: gluteal  -ER Primary Wound Type: Pressure inj  -ER              Wound 03/13/22 0524 Right proximal arm     Wound - Properties Group Placement Date: 03/13/22  -ER Placement Time: 0524  -ER Side: Right  -ER Orientation: proximal  -ER Location: arm  -ER      Base non-blanchable  -DB      Periwound Temperature warm  -DB      Periwound Skin Turgor soft  -DB      Edges open  -DB      Retired Wound - Properties Group Placement Date: 03/13/22  -ER Placement Time: 0524  -ER Side: Right  -ER Orientation: proximal  -ER Location: arm  -ER      Retired Wound - Properties Group Date first assessed: 03/13/22  -ER Time first assessed: 0524  -ER Side: Right  -ER Location: arm  -ER              Wound 03/13/22 0528 Left lower leg Abrasion    Wound - Properties Group Placement Date: 03/13/22  -ER Placement Time: 0528  -ER Side: Left  -ER Orientation: lower  -ER Location: leg  -ER Primary Wound Type: Abrasion  -ER      Base scab  -DB      Retired Wound - Properties Group Placement Date: 03/13/22  -ER Placement Time: 0528  -ER Side: Left  -ER Orientation: lower  -ER Location: leg  -ER Primary Wound Type: Abrasion  -ER      Retired Wound - Properties Group Date first assessed: 03/13/22  -ER Time first assessed: 0528  -ER Side: Left  -ER Location: leg  -ER Primary Wound Type: Abrasion  -ER              Rash 03/13/22 0510 groin    Rash - Properties Group Placement Date: 03/13/22  -ER Placement Time: 0510  -ER Location: groin  -ER      Color pink;red  -DB      Characteristics scaly;pain/discomfort  -DB      Retired Rash - Properties Group Placement Date: 03/13/22  -ER Placement Time: 0510  -ER Location: groin  -ER      Retired Rash - Properties Group Date first assessed: 03/13/22  -ER Time first assessed: 0510  -ER Location: groin  -ER            User Key  (r) = Recorded By, (t) = Taken By, (c) = Cosigned By    Initials Name Provider Type    Graciela Martinez RN Registered Nurse    Yeni Das RN Registered Nurse    Abbi Hooks RN Registered Nurse                  Assessment/Plan      Brief Patient Summary:    67-year-old  female with history of alcoholic cirrhosis, breast/cervical cancer and chronic hypoxemic respiratory failure on 3L oxygen presented to [facility] ED for evaluation of weakness.  Patient was found to have low blood pressure, tachycardia, confusion and was recently started by PCP on Keflex for cellulitis.  Patient found with extensive skin excoriation of thighs, lower abdomen and coccygeal region.  Patient admitted for acute CHF, sepsis due to lower extremity cellulitis and hyponatremia.      aluminum sulfate-calcium acetate 1:20, 500 mL, Topical, Q8H  carvedilol, 3.125 mg, Oral, BID With Meals  [START ON 3/16/2022] cefTRIAXone Sodium-Dextrose, 1 g, Intravenous, Q24H  enoxaparin, 40 mg, Subcutaneous, Q24H  hydrocortisone-bacitracin-zinc oxide-nystatin, 1 application, Topical, TID  ipratropium-albuterol, 3 mL, Nebulization, 4x Daily - RT  LORazepam, 0.5 mg, Oral, Q6H   Followed by  [START ON 3/16/2022] LORazepam, 0.5 mg, Oral, Q8H  magnesium sulfate, 2 g, Intravenous, Once  midodrine, 10 mg, Oral, TID AC  multivitamin, 1 tablet, Oral, Daily  nystatin, , Topical, Q12H  pantoprazole, 40 mg, Oral, Q AM  sodium chloride, 10 mL, Intravenous, Q12H  sodium phosphate IVPB, 10 mmol, Intravenous, Once  thiamine, 100 mg, Oral, Daily       sodium chloride, 125 mL/hr, Last Rate: 125 mL/hr (03/15/22 0534)         Active Hospital Problems:  Active Hospital Problems    Diagnosis    • **CHF exacerbation (HCC)    • Dermatitis associated with moisture    • Unstageable pressure ulcer of sacral region (HCC)    • Severe malnutrition (CMS/HCC)    • Hyponatremia with excess extracellular fluid volume    • Elevated liver enzymes    • Thrombocytopenia (HCC)    • Lactic acidosis    • SIRS (systemic inflammatory response syndrome) (HCC)    • Skin excoriation      Acute diastolic heart failure:  -s/p TTE 3/13/22--> LVEF 61-65%.  -Cardiology consult    Hypovolemic hyponatremia:   -Slowly improving with IVF  -Nephrology  consulted    Hyperkalemia: Resolved  -s/p calcium gluconate, Lokelma    Sepsis due to lower extremity cellulitis:  -Continue Rocephin  -Wound care following    Alcoholic cirrhosis complicated with elevated LFTs, jaundice and thrombocytopenia:  -Check ammonia in the a.m.  -No evidence of bleeding  -Unknown if she follows with GI  -Ammonia within normal limits    Alcohol abuse:  -On CIWA protocol  -Continue multivitamins and thiamine    severe protein calorie malnutrition:  -Encouraged oral intake  -PT/OT following  -Mostly bedbound and has a caretaker at home    COPD/asthma:  -Not in exacerbation    Chronic hypoxemic respiratory failure:  -On 3L at home    GERD:  -Continue PPI    Tobacco dependence:  -We will offer nicotine patch      DVT prophylaxis:  Medical DVT prophylaxis orders are present.    CODE STATUS:    Medical Intervention Limits: NO intubation (DNI); NO cardioversion; NO antiarrhythmic drugs  Level Of Support Discussed With: Patient  Code Status (Patient has no pulse and is not breathing): No CPR (Do Not Attempt to Resuscitate)  Medical Interventions (Patient has pulse or is breathing): Limited Support      Disposition:  I expect patient to be discharged defer.    This patient has been examined wearing appropriate Personal Protective Equipment and discussed with nursing. 03/15/22      Electronically signed by Eloy Duke DO, 03/15/22, 12:33 EDT.  [facility]ist Team

## 2022-03-15 NOTE — CASE MANAGEMENT/SOCIAL WORK
Continued Stay Note  [facility]     Patient Name: Shanelle Solis  MRN: [id number]  Today's Date: 3/15/2022    Admit Date: 3/13/2022     Discharge Plan     Row Name 03/15/22 1215       Plan    Plan DC Plan: [facility] referral pending acceptance. No precert required, PASRR per facility. From home with caregivers and [facility].    Provided Post Acute Provider List? Yes    Post Acute Provider List Inpatient Rehab    Provided Post Acute Provider Quality & Resource List? Yes    Post Acute Provider Quality and Resource List Inpatient Rehab    Delivered To Patient    Method of Delivery In person    Patient/Family in Agreement with Plan yes    Plan Comments CM met with patient at bedside to follow up on rehab choices. Notified her that [facility] unable to accept. She inquired about other options in [place]. She reported she did not want [facility] but was agreeable to [facility]. CM sent new referral in basket and to liaisonSirena.              Met with patient in room wearing PPE: mask/goggles.      Maintained distance greater than six feet and spent less than 15 minutes in the room.      Aurelia Henao RN     Office Phone: [phone]  Office Cell: [phone]

## 2022-03-15 NOTE — SIGNIFICANT NOTE
03/15/22 1328   OTHER   Discipline physical therapist   Rehab Time/Intention   Session Not Performed unable to treat, medical status change  (pt's BP map  was too low for PT today per nursing)   Recommendation   PT - Next Appointment 03/16/22

## 2022-03-15 NOTE — SIGNIFICANT NOTE
Midodrine administered. RN at bedside monitoring patient and blood pressure. Patient remains alert and continues to answer orientation questions appropriately.

## 2022-03-15 NOTE — PROGRESS NOTES
"[facility] PROGRESS NOTE     LOS: 2 days    Patient Care Team:  Provider, No Known as PCP - General      Subjective     Patient feeling very tired.  Still has cough with shortness of breath and minimal expectoration    Objective     Vital Sign Min/Max for last 24 hours  Temp:  [97.7 °F (36.5 °C)-98.7 °F (37.1 °C)] 98 °F (36.7 °C)  Heart Rate:  [68-89] 69  Resp:  [16-20] 16  BP: ()/() 101/41                       Flowsheet Rows    Flowsheet Row First Filed Value   Admission Height 165.1 cm (65\") Documented at 03/13/2022 0142   Admission Weight 53.2 kg (117 lb 4.6 oz) Documented at 03/13/2022 0142          No intake/output data recorded.  I/O last 3 completed shifts:  In: 730 [P.O.:630; IV Piggyback:100]  Out: 200 [Urine:200]    Physical Exam:  Physical Exam    General Appearance: Chronically ill-appearing  Skin: warm and dry  HEENT: oral mucosa dry, nonicteric sclera  Neck: supple, no JVD  Lungs: Bilateral rhonchi   heart: RRR, normal S1 and S2  Abdomen: soft, nontender, nondistended  : no palpable bladder  Extremities: no edema, cyanosis or clubbing         LABS:  Lab Results   Component Value Date    CALCIUM 7.8 (L) 03/15/2022    PHOS 1.8 (C) 03/15/2022     Results from last 7 days   Lab Units 03/15/22  0805 03/14/22  1236 03/14/22  0521 03/13/22  1702 03/13/22  0631   MAGNESIUM mg/dL 1.4* 1.6  --   --  1.6   SODIUM mmol/L 122* 122* 122*   < > 116*   POTASSIUM mmol/L 4.8 4.7 4.7   < > 6.1*   CHLORIDE mmol/L 88* 84* 83*   < > 77*   CO2 mmol/L 30.0* 36.0* 39.0*   < > 35.0*   BUN mg/dL 11 11 11   < > 10   CREATININE mg/dL 0.53* 0.59 0.51*   < > 0.62   GLUCOSE mg/dL 102* 127* 101*   < > 115*   CALCIUM mg/dL 7.8* 8.5* 8.3*   < > 8.6   WBC 10*3/mm3 3.00* 5.80 5.30  --  10.20   HEMOGLOBIN g/dL 8.8* 9.9* 9.4*  --  11.1*   PLATELETS 10*3/mm3 72* 84* 77*  --  114*   ALT (SGPT) U/L  --   --   --   --  64*   AST (SGOT) U/L  --   --   --   --  58*    < > = values in this interval not displayed.     Lab Results "   Component Value Date    CKTOTAL 42 03/14/2022    TROPONINT <0.010 03/13/2022     Estimated Creatinine Clearance: 58.8 mL/min (A) (by C-G formula based on SCr of 0.53 mg/dL (L)).  Results from last 7 days   Lab Units 03/14/22  1454   PH, ARTERIAL pH units 7.357   PO2 ART mm Hg 100.9   PCO2, ARTERIAL mm Hg 70.1*   HCO3 ART mmol/L 39.3*     Brief Urine Lab Results     None        WEIGHTS:     Wt Readings from Last 1 Encounters:   03/13/22 0722 53.1 kg (117 lb)   03/13/22 0142 53.2 kg (117 lb 4.6 oz)       aluminum sulfate-calcium acetate 1:20, 500 mL, Topical, Q8H  carvedilol, 3.125 mg, Oral, BID With Meals  [START ON 3/16/2022] cefTRIAXone Sodium-Dextrose, 1 g, Intravenous, Q24H  enoxaparin, 40 mg, Subcutaneous, Q24H  hydrocortisone-bacitracin-zinc oxide-nystatin, 1 application, Topical, TID  ipratropium-albuterol, 3 mL, Nebulization, 4x Daily - RT  LORazepam, 0.5 mg, Oral, Q6H   Followed by  [START ON 3/16/2022] LORazepam, 0.5 mg, Oral, Q8H  magnesium sulfate, 2 g, Intravenous, Once  midodrine, 10 mg, Oral, TID AC  multivitamin, 1 tablet, Oral, Daily  nystatin, , Topical, Q12H  pantoprazole, 40 mg, Oral, Q AM  sodium chloride, 10 mL, Intravenous, Q12H  sodium phosphate IVPB, 10 mmol, Intravenous, Once  thiamine, 100 mg, Oral, Daily      sodium chloride, 125 mL/hr, Last Rate: 125 mL/hr (03/15/22 0534)        Assessment/Plan       1.  Hyponatremia  Seems to be hypovolemic hyponatremia as evidenced by low urinary sodium but cirrhosis contributing  Sodium stable around 122  2.  Hypotension  3.  CHF.  Diastolic.  Chronic  4.  Alcoholic cirrhosis  5.  Hypomagnesemia  6.  Hypophosphatemia      Recs:  Repeat urine electrolytes  Continue IV hydration normal saline  Discontinue trazodone  Continue oral midodrine    Salazar Santos MD  03/15/22  12:27 EDT

## 2022-03-16 ENCOUNTER — APPOINTMENT (OUTPATIENT)
Dept: GENERAL RADIOLOGY | Facility: HOSPITAL | Age: 67
End: 2022-03-16

## 2022-03-16 LAB
ALBUMIN SERPL-MCNC: 1.8 G/DL (ref 3.5–5.2)
ANION GAP SERPL CALCULATED.3IONS-SCNC: 2 MMOL/L (ref 5–15)
BASOPHILS # BLD AUTO: 0 10*3/MM3 (ref 0–0.2)
BASOPHILS NFR BLD AUTO: 0.5 % (ref 0–1.5)
BUN SERPL-MCNC: 10 MG/DL (ref 8–23)
BUN/CREAT SERPL: 18.5 (ref 7–25)
CALCIUM SPEC-SCNC: 7.4 MG/DL (ref 8.6–10.5)
CHLORIDE SERPL-SCNC: 93 MMOL/L (ref 98–107)
CO2 SERPL-SCNC: 33 MMOL/L (ref 22–29)
CREAT SERPL-MCNC: 0.54 MG/DL (ref 0.57–1)
DEPRECATED RDW RBC AUTO: 63.9 FL (ref 37–54)
EGFRCR SERPLBLD CKD-EPI 2021: 102.3 ML/MIN/1.73
EOSINOPHIL # BLD AUTO: 0.2 10*3/MM3 (ref 0–0.4)
EOSINOPHIL NFR BLD AUTO: 5.3 % (ref 0.3–6.2)
ERYTHROCYTE [DISTWIDTH] IN BLOOD BY AUTOMATED COUNT: 17.8 % (ref 12.3–15.4)
GLUCOSE SERPL-MCNC: 96 MG/DL (ref 65–99)
HCT VFR BLD AUTO: 28 % (ref 34–46.6)
HGB BLD-MCNC: 9 G/DL (ref 12–15.9)
LYMPHOCYTES # BLD AUTO: 0.5 10*3/MM3 (ref 0.7–3.1)
LYMPHOCYTES NFR BLD AUTO: 14.9 % (ref 19.6–45.3)
MAGNESIUM SERPL-MCNC: 1.9 MG/DL (ref 1.6–2.4)
MCH RBC QN AUTO: 32.4 PG (ref 26.6–33)
MCHC RBC AUTO-ENTMCNC: 32 G/DL (ref 31.5–35.7)
MCV RBC AUTO: 101.1 FL (ref 79–97)
MONOCYTES # BLD AUTO: 0.3 10*3/MM3 (ref 0.1–0.9)
MONOCYTES NFR BLD AUTO: 8.2 % (ref 5–12)
NEUTROPHILS NFR BLD AUTO: 2.3 10*3/MM3 (ref 1.7–7)
NEUTROPHILS NFR BLD AUTO: 71.1 % (ref 42.7–76)
NRBC BLD AUTO-RTO: 0.3 /100 WBC (ref 0–0.2)
OSMOLALITY UR: 318 MOSM/KG (ref 300–800)
PHOSPHATE SERPL-MCNC: 2.5 MG/DL (ref 2.5–4.5)
PLATELET # BLD AUTO: 68 10*3/MM3 (ref 140–450)
PMV BLD AUTO: 7.9 FL (ref 6–12)
POTASSIUM SERPL-SCNC: 4.9 MMOL/L (ref 3.5–5.2)
POTASSIUM UR-SCNC: 23.9 MMOL/L
RBC # BLD AUTO: 2.77 10*6/MM3 (ref 3.77–5.28)
SODIUM SERPL-SCNC: 128 MMOL/L (ref 136–145)
SODIUM UR-SCNC: 39 MMOL/L
URATE SERPL-MCNC: 5.1 MG/DL (ref 2.4–5.7)
WBC NRBC COR # BLD: 3.2 10*3/MM3 (ref 3.4–10.8)

## 2022-03-16 PROCEDURE — 94799 UNLISTED PULMONARY SVC/PX: CPT

## 2022-03-16 PROCEDURE — 25010000002 ALBUMIN HUMAN 25% PER 50 ML: Performed by: NURSE PRACTITIONER

## 2022-03-16 PROCEDURE — 25010000002 ENOXAPARIN PER 10 MG: Performed by: INTERNAL MEDICINE

## 2022-03-16 PROCEDURE — P9047 ALBUMIN (HUMAN), 25%, 50ML: HCPCS | Performed by: NURSE PRACTITIONER

## 2022-03-16 PROCEDURE — 83935 ASSAY OF URINE OSMOLALITY: CPT | Performed by: INTERNAL MEDICINE

## 2022-03-16 PROCEDURE — 97116 GAIT TRAINING THERAPY: CPT

## 2022-03-16 PROCEDURE — 84133 ASSAY OF URINE POTASSIUM: CPT | Performed by: INTERNAL MEDICINE

## 2022-03-16 PROCEDURE — 99232 SBSQ HOSP IP/OBS MODERATE 35: CPT | Performed by: INTERNAL MEDICINE

## 2022-03-16 PROCEDURE — 99232 SBSQ HOSP IP/OBS MODERATE 35: CPT | Performed by: HOSPITALIST

## 2022-03-16 PROCEDURE — 94760 N-INVAS EAR/PLS OXIMETRY 1: CPT

## 2022-03-16 PROCEDURE — 71045 X-RAY EXAM CHEST 1 VIEW: CPT

## 2022-03-16 PROCEDURE — 84300 ASSAY OF URINE SODIUM: CPT | Performed by: INTERNAL MEDICINE

## 2022-03-16 PROCEDURE — 25010000002 FUROSEMIDE PER 20 MG: Performed by: NURSE PRACTITIONER

## 2022-03-16 PROCEDURE — 25010000002 CEFTRIAXONE SODIUM-DEXTROSE 1-3.74 GM-%(50ML) RECONSTITUTED SOLUTION: Performed by: HOSPITALIST

## 2022-03-16 PROCEDURE — 80069 RENAL FUNCTION PANEL: CPT | Performed by: INTERNAL MEDICINE

## 2022-03-16 PROCEDURE — 85025 COMPLETE CBC W/AUTO DIFF WBC: CPT | Performed by: HOSPITALIST

## 2022-03-16 PROCEDURE — 84550 ASSAY OF BLOOD/URIC ACID: CPT | Performed by: INTERNAL MEDICINE

## 2022-03-16 PROCEDURE — 97530 THERAPEUTIC ACTIVITIES: CPT

## 2022-03-16 PROCEDURE — 83735 ASSAY OF MAGNESIUM: CPT | Performed by: HOSPITALIST

## 2022-03-16 RX ORDER — SODIUM BICARBONATE 650 MG/1
650 TABLET ORAL 3 TIMES DAILY
Status: DISCONTINUED | OUTPATIENT
Start: 2022-03-16 | End: 2022-03-16

## 2022-03-16 RX ORDER — ALBUMIN (HUMAN) 12.5 G/50ML
25 SOLUTION INTRAVENOUS ONCE
Status: COMPLETED | OUTPATIENT
Start: 2022-03-16 | End: 2022-03-16

## 2022-03-16 RX ORDER — FUROSEMIDE 10 MG/ML
60 INJECTION INTRAMUSCULAR; INTRAVENOUS ONCE
Status: COMPLETED | OUTPATIENT
Start: 2022-03-16 | End: 2022-03-16

## 2022-03-16 RX ORDER — SODIUM CHLORIDE 1000 MG
1 TABLET, SOLUBLE MISCELLANEOUS ONCE
Status: COMPLETED | OUTPATIENT
Start: 2022-03-16 | End: 2022-03-16

## 2022-03-16 RX ADMIN — HYDROCORTISONE 1 APPLICATION: 1 OINTMENT TOPICAL at 21:51

## 2022-03-16 RX ADMIN — Medication 10 ML: at 10:06

## 2022-03-16 RX ADMIN — MIDODRINE HYDROCHLORIDE 10 MG: 5 TABLET ORAL at 17:42

## 2022-03-16 RX ADMIN — CALCIUM ACETATE MONOHYDRATE AND ALUMINUM SULFATE TETRADECAHYDRATE 500 ML: 952; 1347 POWDER, FOR SOLUTION TOPICAL at 14:16

## 2022-03-16 RX ADMIN — Medication 10 ML: at 21:50

## 2022-03-16 RX ADMIN — MIDODRINE HYDROCHLORIDE 10 MG: 5 TABLET ORAL at 11:31

## 2022-03-16 RX ADMIN — PANTOPRAZOLE SODIUM 40 MG: 40 TABLET, DELAYED RELEASE ORAL at 05:14

## 2022-03-16 RX ADMIN — THERA TABS 1 TABLET: TAB at 10:05

## 2022-03-16 RX ADMIN — HYDROCORTISONE 1 APPLICATION: 1 OINTMENT TOPICAL at 10:11

## 2022-03-16 RX ADMIN — SODIUM CHLORIDE 125 ML/HR: 9 INJECTION, SOLUTION INTRAVENOUS at 02:28

## 2022-03-16 RX ADMIN — ENOXAPARIN SODIUM 40 MG: 40 INJECTION SUBCUTANEOUS at 17:42

## 2022-03-16 RX ADMIN — IPRATROPIUM BROMIDE AND ALBUTEROL SULFATE 3 ML: .5; 3 SOLUTION RESPIRATORY (INHALATION) at 10:47

## 2022-03-16 RX ADMIN — FUROSEMIDE 60 MG: 10 INJECTION, SOLUTION INTRAMUSCULAR; INTRAVENOUS at 05:14

## 2022-03-16 RX ADMIN — MIDODRINE HYDROCHLORIDE 10 MG: 5 TABLET ORAL at 10:05

## 2022-03-16 RX ADMIN — HYDROCORTISONE 1 APPLICATION: 1 OINTMENT TOPICAL at 16:00

## 2022-03-16 RX ADMIN — NYSTATIN: 100000 POWDER TOPICAL at 17:42

## 2022-03-16 RX ADMIN — ALBUMIN (HUMAN) 25 G: 0.25 INJECTION, SOLUTION INTRAVENOUS at 04:08

## 2022-03-16 RX ADMIN — IPRATROPIUM BROMIDE AND ALBUTEROL SULFATE 3 ML: .5; 3 SOLUTION RESPIRATORY (INHALATION) at 14:52

## 2022-03-16 RX ADMIN — CALCIUM ACETATE MONOHYDRATE AND ALUMINUM SULFATE TETRADECAHYDRATE 500 ML: 952; 1347 POWDER, FOR SOLUTION TOPICAL at 05:02

## 2022-03-16 RX ADMIN — CEFTRIAXONE 1 G: 1 INJECTION, SOLUTION INTRAVENOUS at 05:13

## 2022-03-16 RX ADMIN — Medication 1 G: at 04:08

## 2022-03-16 RX ADMIN — IPRATROPIUM BROMIDE AND ALBUTEROL SULFATE 3 ML: .5; 3 SOLUTION RESPIRATORY (INHALATION) at 19:00

## 2022-03-16 RX ADMIN — CALCIUM ACETATE MONOHYDRATE AND ALUMINUM SULFATE TETRADECAHYDRATE 500 ML: 952; 1347 POWDER, FOR SOLUTION TOPICAL at 21:50

## 2022-03-16 RX ADMIN — Medication 100 MG: at 10:05

## 2022-03-16 RX ADMIN — NYSTATIN: 100000 POWDER TOPICAL at 05:02

## 2022-03-16 NOTE — PROGRESS NOTES
Cardiology Progress  Note      Patient Care Team:  Provider, No Known as PCP - General    PATIENT IDENTIFICATION  Name: Shanelle Solis  Age: 67 y.o.  Sex: female  :  1955  MRN: [id number]             REASON FOR FOLLOW-UP:  Fluid overload/congestive heart failure        SUBJECTIVE    Patient seen and examined, chart and labs reviewed.  Patient lying in bed eating, normal respiratory effort.  Patient had shortness of breath overnight.  IV fluids were stopped, she was given 1 g oral sodium tablets and she received IV Lasix.  Rhythm sinus at 77.  She denies any chest discomfort, palpitations, nausea or vomiting.  Blood pressure still low with systolic readings 80s-90s.      REVIEW OF SYSTEMS:  Pertinent items are noted in HPI, all other systems reviewed and negative    OBJECTIVE   Sodium 128  Hemoglobin 9    ASSESSMENT  Acute heart failure with preserved ejection fraction  Hyponatremia  Hyper kalemia  Altered mental status-resolved  Hypotension/tachycardia-improved  Lower extremity cellulitis  Sepsis with lactic acidosis  EtOH abuse with cirrhosis  COPD  GERD  History of breast cancer  Tobacco dependence disorder  Generalized weakness          RECOMMENDATIONS  TTE with EF 61-65%, RA mod dilated, technically difficult study, valvular structures not well visualized.  No evidence of pericardial effusion.  Nephrology following.  CHF protocol initiated with fluid restriction, cardiac diet, IV Lasix 40 mg daily.  Receiving carvedilol 3.125 mg every 12  Patient receiving empiric antibiotic coverage for cellulitis  CIWA protocol initiated  Replacing electrolytes as needed  Monitor renal function and electrolytes and replace as needed    2022  Diastolic heart failure  Heart failure with preserved ejection fraction  Electrolyte abnormalities  Altered mental status  Lactic acidosis  Alcohol abuse  Frail status  Continue supportive care  Discussed with the patient at bedside  Intermittent low blood  "pressure  Patient is currently on midodrine  Hyponatremia  Agree with holding diuretics  Discussed with patient at bedside    Chart and labs reviewed.  History and exam findings are verified with above changes noted.  Assessment and plan notated by APC after being formulated by attending consultant.  Note that greater than 50% of the time spent in care of the patient was provided by attending consultant.        Vital Signs  Visit Vitals  BP (!) (P) 83/47 (BP Location: Right arm, Patient Position: Sitting)   Pulse (P) 88   Temp (P) 97.6 °F (36.4 °C) (Oral)   Resp (P) 14   Ht 165.1 cm (65\")   Wt 59.9 kg (132 lb 0.9 oz)   SpO2 (P) 100%   BMI 21.98 kg/m²     Oxygen Therapy  SpO2: (P) 100 %  Pulse Oximetry Type: (P) Continuous  Device (Oxygen Therapy): (P) nasal cannula  Flow (L/min): (P) 5  Oxygen Concentration (%): 40  Flowsheet Rows    Flowsheet Row First Filed Value   Admission Height 165.1 cm (65\") Documented at 03/13/2022 0142   Admission Weight 53.2 kg (117 lb 4.6 oz) Documented at 03/13/2022 0142        Intake & Output (last 3 days)       03/13 0701  03/14 0700 03/14 0701  03/15 0700 03/15 0701  03/16 0700 03/16 0701  03/17 0700    P.O. 100 630 420     I.V. (mL/kg)   1000 (16.7)     IV Piggyback 100       Total Intake(mL/kg) 200 (3.8) 630 (11.9) 1420 (23.7)     Urine (mL/kg/hr) 200 (0.2)  375 (0.3)     Total Output 200  375     Net 0 +630 +1045             Urine Unmeasured Occurrence   2 x         Lines, Drains & Airways     Active LDAs     Name Placement date Placement time Site Days    Peripheral IV 03/13/22 0400 Anterior;Right Forearm 03/13/22  0400  placed from other hospital  Forearm  1    External Urinary Catheter 03/13/22  1157  --  1                       BP (!) (P) 83/47 (BP Location: Right arm, Patient Position: Sitting)   Pulse (P) 88   Temp (P) 97.6 °F (36.4 °C) (Oral)   Resp (P) 14   Ht 165.1 cm (65\")   Wt 59.9 kg (132 lb 0.9 oz)   SpO2 (P) 100%   BMI 21.98 kg/m²   Intake/Output last 3 " shifts:  I/O last 3 completed shifts:  In: 1780 [P.O.:780; I.V.:1000]  Out: 375 [Urine:375]  Intake/Output this shift:  No intake/output data recorded.    PHYSICAL EXAM:  General:          Well-developed, thin 67-year-old female who is alert, cooperative, no distress, appears stated age  Head:              Normocephalic, atraumatic, mucous membranes moist  Eyes:               Conjunctiva/corneas clear, EOM's intact     Neck:              Supple,  no bruit  Lungs:            Very coarse to auscultation with expiratory wheezes bilaterally, decreased airflow.  Chest wall:     No tenderness  Heart::             Regular rate and rhythm, S1 and S2 normal, 1/6 holosystolic murmur.  No rub or gallop  Abdomen:      Soft, non-tender, nondistended bowel sounds active  Extremities:    Trace edema to lower extremities  Pulses:           Diminished pedal pulses.  Skin:                No rashes or lesions  Neuro/psych: A&O x3. CN II through XII are grossly intact with flat affect      Scheduled Meds:      aluminum sulfate-calcium acetate 1:20, 500 mL, Topical, Q8H  carvedilol, 3.125 mg, Oral, BID With Meals  cefTRIAXone Sodium-Dextrose, 1 g, Intravenous, Q24H  enoxaparin, 40 mg, Subcutaneous, Q24H  hydrocortisone-bacitracin-zinc oxide-nystatin, 1 application, Topical, TID  ipratropium-albuterol, 3 mL, Nebulization, 4x Daily - RT  LORazepam, 0.5 mg, Oral, Q8H  midodrine, 10 mg, Oral, TID AC  multivitamin, 1 tablet, Oral, Daily  nystatin, , Topical, Q12H  pantoprazole, 40 mg, Oral, Q AM  sodium chloride, 10 mL, Intravenous, Q12H  thiamine, 100 mg, Oral, Daily        Continuous Infusions:         PRN Meds:    •  albuterol  •  magnesium sulfate **OR** magnesium sulfate **OR** magnesium sulfate  •  potassium & sodium phosphates **OR** potassium & sodium phosphates  •  potassium chloride **OR** potassium chloride **OR** potassium chloride  •  sodium chloride        Results Review:     I reviewed the patient's new clinical  results.    CBC    Results from last 7 days   Lab Units 03/16/22  0413 03/15/22  0805 03/14/22  1236 03/14/22  0521 03/13/22  0631   WBC 10*3/mm3 3.20* 3.00* 5.80 5.30 10.20   HEMOGLOBIN g/dL 9.0* 8.8* 9.9* 9.4* 11.1*   PLATELETS 10*3/mm3 68* 72* 84* 77* 114*     Cr Clearance Estimated Creatinine Clearance: 66.3 mL/min (A) (by C-G formula based on SCr of 0.54 mg/dL (L)).  Coag   Results from last 7 days   Lab Units 03/13/22  0631   INR  1.21*     HbA1C No results found for: HGBA1C  Blood Glucose   Glucose   Date/Time Value Ref Range Status   03/14/2022 1446 127 (H) 70 - 105 mg/dL Final     Comment:     Serial Number: [id number]:  [id number]     Infection   Results from last 7 days   Lab Units 03/13/22  0635 03/13/22  0631   BLOODCX  No growth at 3 days No growth at 3 days     CMP   Results from last 7 days   Lab Units 03/16/22  0413 03/15/22  0805 03/14/22  1236 03/14/22  0521 03/13/22  2146 03/13/22  1702 03/13/22  0631   SODIUM mmol/L 128* 122* 122* 122* 120* 119* 116*   POTASSIUM mmol/L 4.9 4.8 4.7 4.7 4.8 5.1  5.1 6.1*   CHLORIDE mmol/L 93* 88* 84* 83* 80* 80* 77*   CO2 mmol/L 33.0* 30.0* 36.0* 39.0* 35.0* 36.0* 35.0*   BUN mg/dL 10 11 11 11 10 11 10   CREATININE mg/dL 0.54* 0.53* 0.59 0.51* 0.61 0.61 0.62   GLUCOSE mg/dL 96 102* 127* 101* 103* 140* 115*   ALBUMIN g/dL 1.80* 1.80*  --  2.00* 2.10* 2.10* 2.40*   BILIRUBIN mg/dL  --   --   --   --   --   --  2.9*   ALK PHOS U/L  --   --   --   --   --   --  137*   AST (SGOT) U/L  --   --   --   --   --   --  58*   ALT (SGPT) U/L  --   --   --   --   --   --  64*   AMMONIA umol/L  --   --   --  20  --   --   --      ABG    Results from last 7 days   Lab Units 03/14/22  1454 03/13/22  0459   PH, ARTERIAL pH units 7.357 7.391   PCO2, ARTERIAL mm Hg 70.1* 60.5*   PO2 ART mm Hg 100.9 51.5*   O2 SATURATION ART % 97.1 84.4*   BASE EXCESS ART mmol/L 11.9* 9.8*     UA      SHANICE  No results found for: POCMETH, POCAMPHET, POCBARBITUR, POCBENZO, POCCOCAINE, POCOPIATES,  POCOXYCODO, POCPHENCYC, POCPROPOXY, POCTHC, POCTRICYC  Lysis Labs   Results from last 7 days   Lab Units 03/16/22  0413 03/15/22  0805 03/14/22  1236 03/14/22  0521 03/13/22  2146 03/13/22  1702 03/13/22  0631   INR   --   --   --   --   --   --  1.21*   HEMOGLOBIN g/dL 9.0* 8.8* 9.9* 9.4*  --   --  11.1*   PLATELETS 10*3/mm3 68* 72* 84* 77*  --   --  114*   CREATININE mg/dL 0.54* 0.53* 0.59 0.51* 0.61 0.61 0.62     Radiology(recent) No radiology results for the last day      Results from last 7 days   Lab Units 03/14/22  0521 03/13/22  0631   CK TOTAL U/L 42  --    TROPONIN T ng/mL  --  <0.010       Xrays, labs reviewed personally by physician.    ECG/EMG Results (most recent)     Procedure Component Value Units Date/Time    Adult Transthoracic Echo Complete With Contrast if Necessary Per Protocol [[id number]] Resulted: 03/13/22 0929     Updated: 03/13/22 0931     Target HR (85%) 132 bpm      Max. Pred. HR (100%) 155 bpm      Ao root diam 2.16 cm      Ao pk gabriel 119.8 cm/sec      Ao V2 VTI 22.3 cm      ARS(I,D) 1.79 cm2      EDV(cubed) 24.2 ml      EDV(MOD-sp4) 29.2 ml      EF(MOD-bp) 63.0 %      EF(MOD-sp4) 62.8 %      ESV(cubed) 10.5 ml      ESV(MOD-sp4) 10.9 ml      IVS/LVPW 1.13 cm      LV mass(C)d 58.6 grams      LV V1 max PG 4.8 mmHg      LV V1 mean PG 1.97 mmHg      LV V1 max 110.1 cm/sec      LVPWd 0.78 cm      MV dec slope 340.5 cm/sec2      MV dec time 0.13 msec      MV V2 VTI 16.7 cm      MVA(VTI) 2.39 cm2      PA acc time 0.11 sec      PA pr(Accel) 29.2 mmHg      PA V2 max 118.0 cm/sec      RAP systole 3.0 mmHg      RV V1 max PG 6.6 mmHg      RV V1 max 128.3 cm/sec      RV V1 VTI 21.2 cm      RVIDd 3.1 cm      RVSP(TR) 18.3 mmHg      SI(MOD-sp4) 11.6 ml/m2      SV(LVOT) 40.0 ml      SV(MOD-sp4) 18.3 ml      SV(RVOT) 82.3 ml      TR max PG 15.3 mmHg      Ao max PG 5.7 mmHg      Ao mean PG 3.1 mmHg      FS 24.3 %      IVSd 0.88 cm      LA dimension(2D) 2.5 cm      LV V1 VTI 18.9 cm      LVIDd 2.9 cm       LVIDs 2.19 cm      LVOT area 2.11 cm2      LVOT diam 1.64 cm      MV E/A 0.70     MV max PG 3.4 mmHg      MV mean PG 1.91 mmHg      RVOT diam 2.22 cm      MV A max gabriel 64.8 cm/sec      MV E max gabriel 45.1 cm/sec      TR max gabriel 195.7 cm/sec      LV [place] Vol (BSA corrected) 18.6 cm2      LV Sys Vol (BSA corrected) 6.9 cm2     Narrative:      · Estimated left ventricular EF was in agreement with the calculated left   ventricular EF. Left ventricular ejection fraction appears to be 61 - 65%.   Left ventricular systolic function is normal.  · The right atrial cavity is moderately dilated.  · The study is technically difficult for diagnosis.  · The quality of the study is limited due to Subcostal windows only for   cardiac imaging..       SCANNED - TELEMETRY   [[id number]] Resulted: 03/13/22     Updated: 03/14/22 0802    SCANNED - TELEMETRY   [[id number]] Resulted: 03/13/22     Updated: 03/14/22 0821    SCANNED - TELEMETRY   [[id number]] Resulted: 03/13/22     Updated: 03/14/22 0837    SCANNED - TELEMETRY   [[id number]] Resulted: 03/13/22     Updated: 03/14/22 1147    SCANNED - TELEMETRY   [[id number]] Resulted: 03/13/22     Updated: 03/14/22 1155    SCANNED - TELEMETRY   [[id number]] Resulted: 03/13/22     Updated: 03/14/22 1214    SCANNED - TELEMETRY   [[id number]] Resulted: 03/13/22     Updated: 03/14/22 1317    SCANNED - TELEMETRY   [[id number]] Resulted: 03/13/22     Updated: 03/14/22 1347    SCANNED - TELEMETRY   [[id number]] Resulted: 03/13/22     Updated: 03/14/22 1517    SCANNED - TELEMETRY   [[id number]] Resulted: 03/13/22     Updated: 03/15/22 0830    SCANNED - TELEMETRY   [[id number]] Resulted: 03/13/22     Updated: 03/15/22 0947    SCANNED - TELEMETRY   [[id number]] Resulted: 03/13/22     Updated: 03/15/22 0958    SCANNED - TELEMETRY   [[id number]] Resulted: 03/13/22     Updated: 03/15/22 1009    SCANNED - TELEMETRY   [[id number]] Resulted: 03/13/22     Updated: 03/15/22 1102    SCANNED - TELEMETRY   [[id number]] Resulted:  "03/13/22     Updated: 03/15/22 1457    SCANNED - TELEMETRY   [[id number]] Resulted: 03/13/22     Updated: 03/15/22 1505    SCANNED - TELEMETRY   [[id number]] Resulted: 03/13/22     Updated: 03/16/22 0801    SCANNED - TELEMETRY   [[id number]] Resulted: 03/13/22     Updated: 03/16/22 0822    SCANNED - TELEMETRY   [[id number]] Resulted: 03/13/22     Updated: 03/16/22 0841    SCANNED - TELEMETRY   [[id number]] Resulted: 03/13/22     Updated: 03/16/22 0900    SCANNED - TELEMETRY   [[id number]] Resulted: 03/13/22     Updated: 03/16/22 0900    SCANNED - TELEMETRY   [[id number]] Resulted: 03/13/22     Updated: 03/16/22 1051            Medication Review:   I have reviewed the patient's current medication list  Scheduled Meds:aluminum sulfate-calcium acetate 1:20, 500 mL, Topical, Q8H  carvedilol, 3.125 mg, Oral, BID With Meals  cefTRIAXone Sodium-Dextrose, 1 g, Intravenous, Q24H  enoxaparin, 40 mg, Subcutaneous, Q24H  hydrocortisone-bacitracin-zinc oxide-nystatin, 1 application, Topical, TID  ipratropium-albuterol, 3 mL, Nebulization, 4x Daily - RT  LORazepam, 0.5 mg, Oral, Q8H  midodrine, 10 mg, Oral, TID AC  multivitamin, 1 tablet, Oral, Daily  nystatin, , Topical, Q12H  pantoprazole, 40 mg, Oral, Q AM  sodium chloride, 10 mL, Intravenous, Q12H  thiamine, 100 mg, Oral, Daily      Continuous Infusions:   PRN Meds:.•  albuterol  •  magnesium sulfate **OR** magnesium sulfate **OR** magnesium sulfate  •  potassium & sodium phosphates **OR** potassium & sodium phosphates  •  potassium chloride **OR** potassium chloride **OR** potassium chloride  •  sodium chloride    Imaging:  Imaging Results (Last 72 Hours)     Procedure Component Value Units Date/Time    XR Chest 1 View [[id number]] Resulted: 03/16/22 1233     Updated: 03/16/22 1234            NAVNEET Taylor  03/16/22  12:42 EDT       EMR Dragon/Transcription:   \"Dictated utilizing Dragon dictation\".       Electronically signed by NAVNEET Taylor, 03/16/22, 12:42 PM " EDT.

## 2022-03-16 NOTE — CASE MANAGEMENT/SOCIAL WORK
Continued Stay Note   [facility]     Patient Name: Shanelle Solis  MRN: [id number]  Today's Date: 3/16/2022    Admit Date: 3/13/2022     Discharge Plan     Row Name 03/16/22 1604       Plan    Plan Comments CM contacted [facility] at 0838 to inquire status of pending referral. Liaison reported she would check. CM later inquired again at 1413 if they can accept. Response pending at this time. Avoidable day placed since patient awaiting placement.              Phone communication or documentation only - no physical contact with patient or family.    Aurelia Henao RN     Office Phone: [phone]  Office Cell: [phone]

## 2022-03-16 NOTE — PROGRESS NOTES
Nutrition Services    Patient Name: Shanelle Solis  YOB: 1955  MRN: [id number]  Admission date: 3/13/2022    PPE Documentation        PPE Worn By Provider Did not enter room for this encounter   PPE Worn By Patient  N/A     PROGRESS NOTE      Encounter Information: Check on for PO intakes.         PO Diet: Diet Cardiac; 2gm Na+; Fluid Restriction; 1200cc/day   PO Supplements: Butter Pecan Magic Cup daily  Taye BID   PO Intake:  40% average po intakes since admission        Nutrition support orders:    Nutrition support review:        Labs (reviewed below): Hyponatremia  Low Cr  Hypocalcemia       GI Function:  Last BM 3/14 (x 2 days)       Nutrition Intervention: Continue current diet, supplement and encourage good po intakes.       Results from last 7 days   Lab Units 03/16/22  0413 03/15/22  0805 03/14/22  1236 03/13/22  1702 03/13/22  0631   SODIUM mmol/L 128* 122* 122*   < > 116*   POTASSIUM mmol/L 4.9 4.8 4.7   < > 6.1*   CHLORIDE mmol/L 93* 88* 84*   < > 77*   CO2 mmol/L 33.0* 30.0* 36.0*   < > 35.0*   BUN mg/dL 10 11 11   < > 10   CREATININE mg/dL 0.54* 0.53* 0.59   < > 0.62   CALCIUM mg/dL 7.4* 7.8* 8.5*   < > 8.6   BILIRUBIN mg/dL  --   --   --   --  2.9*   ALK PHOS U/L  --   --   --   --  137*   ALT (SGPT) U/L  --   --   --   --  64*   AST (SGOT) U/L  --   --   --   --  58*   GLUCOSE mg/dL 96 102* 127*   < > 115*    < > = values in this interval not displayed.     Results from last 7 days   Lab Units 03/16/22  0413 03/15/22  0805 03/14/22  1236 03/13/22  1702 03/13/22  0631   MAGNESIUM mg/dL 1.9 1.4* 1.6  --  1.6   PHOSPHORUS mg/dL 2.5 1.8*  --    < > 3.7   HEMOGLOBIN g/dL 9.0* 8.8* 9.9*   < > 11.1*   HEMATOCRIT % 28.0* 27.2* 29.3*   < > 33.1*   TRIGLYCERIDES mg/dL  --   --   --   --  63    < > = values in this interval not displayed.     No results found for: COVID19  No results found for: HGBA1C    RD to follow up per protocol.    Electronically signed by:  Breonna Ferguson, ROXANNE  03/16/22  10:29 EDT

## 2022-03-16 NOTE — NURSING NOTE
Patient blood pressure 180/140. Patient noted to have wheezing and coarse crackles to bilateral lungs. [facility] notified with new order received.

## 2022-03-16 NOTE — PLAN OF CARE
Goal Outcome Evaluation:           Progress: no change  Outcome Evaluation: Pt transferred from PCU. Pt resting well. No complaints at this time. Will continue to monitor.

## 2022-03-16 NOTE — PROGRESS NOTES
[facility] Medicine Services Daily Progress Note    Patient Name: Shanelle Solis  : 1955  MRN: [id number]  Primary Care Physician:  Provider, No Known  Date of admission: 3/13/2022      Subjective      Chief Complaint: None      Patient Reports:    3/13/22: Patient poor historian.  Given Ativan for CIWA.  Nephrology and cardiology consulted.    3/14/22: Low BP.  On midodrine.  Afebrile.  Wound care consulted.  ABG ordered in the afternoon to evaluate confusion.  Likely chronic CO2 retention.    3/15/22: No complaints.  Able to answer questions.  Will replace electrolytes.    3/16/22:  no complaints.  Awaiting placement.    Review of Systems   All other systems reviewed and are negative.         Objective      Vitals:   Temp:  [97.4 °F (36.3 °C)-98 °F (36.7 °C)] (P) 97.5 °F (36.4 °C)  Heart Rate:  [63-96] (P) 85  Resp:  [14-20] (P) 16  BP: ()/() (P) 94/43  Flow (L/min):  [3-5] (P) 5    Physical Exam  HENT:      Head: Normocephalic.      Mouth/Throat:      Mouth: Mucous membranes are moist.   Eyes:      General: No scleral icterus.     Extraocular Movements: Extraocular movements intact.      Pupils: Pupils are equal, round, and reactive to light.   Cardiovascular:      Rate and Rhythm: Normal rate.   Pulmonary:      Effort: Pulmonary effort is normal.   Abdominal:      General: Bowel sounds are normal.   Musculoskeletal:      Cervical back: Normal range of motion.      Comments: Erythema of thighs, posterior thigh and lower abdomen   Neurological:      Mental Status: She is alert.             Result Review    Result Review:  I have personally reviewed the results from the time of this admission to 3/16/2022 14:13 EDT and agree with these findings:  [x]  Laboratory  []  Microbiology  [x]  Radiology  []  EKG/Telemetry   []  Cardiology/Vascular   []  Pathology  [x]  Old records  []  Other:      Wounds (last 24 hours)     LDA Wound     Row Name 22 1247 22 0726  03/15/22 2000       Wound 03/13/22 0506 Left gluteal Pressure Injury    Wound - Properties Group Placement Date: 03/13/22  -ER Placement Time: 0506  -ER Side: Left  -ER Location: gluteal  -ER Primary Wound Type: Pressure inj  -ER    Pressure Injury Stage -- 3  -BB 3  -MF    Dressing Appearance -- open to air  -BB open to air  -MF    Base slough  -BB (r) SC (t) BB (c) slough  -BB --    Periwound excoriated;blanchable  -BB (r) SC (t) BB (c) excoriated;blanchable  -BB intact;blanchable  -MF    Periwound Temperature -- -- warm  -MF    Periwound Skin Turgor -- -- soft  -MF    Edges -- -- open  -MF    Periwound Care -- topical treatment applied  -BB barrier ointment applied  -MF    Retired Wound - Properties Group Placement Date: 03/13/22  -ER Placement Time: 0506  -ER Side: Left  -ER Location: gluteal  -ER Primary Wound Type: Pressure inj  -ER    Retired Wound - Properties Group Date first assessed: 03/13/22  -ER Time first assessed: 0506  -ER Side: Left  -ER Location: gluteal  -ER Primary Wound Type: Pressure inj  -ER       Wound 03/13/22 0507 Right gluteal Pressure Injury    Wound - Properties Group Placement Date: 03/13/22  -ER Placement Time: 0507  -ER Side: Right  -ER Location: gluteal  -ER Primary Wound Type: Pressure inj  -ER    Pressure Injury Stage -- 3  -BB 3  -MF    Dressing Appearance -- open to air  -BB open to air  -MF    Base slough  -BB (r) SC (t) BB (c) slough  -BB non-blanchable;moist  -MF    Periwound blanchable;excoriated  -BB (r) SC (t) BB (c) blanchable;excoriated  -BB intact;blanchable  -MF    Periwound Temperature -- -- warm  -MF    Periwound Skin Turgor -- -- soft  -MF    Dressing Care -- -- open to air  -MF    Periwound Care -- topical treatment applied  -BB barrier ointment applied  -MF    Retired Wound - Properties Group Placement Date: 03/13/22  -ER Placement Time: 0507  -ER Side: Right  -ER Location: gluteal  -ER Primary Wound Type: Pressure inj  -ER    Retired Wound - Properties Group Date  first assessed: 03/13/22  -ER Time first assessed: 0507  -ER Side: Right  -ER Location: gluteal  -ER Primary Wound Type: Pressure inj  -ER       Wound 03/13/22 0524 Right proximal arm    Wound - Properties Group Placement Date: 03/13/22  -ER Placement Time: 0524  -ER Side: Right  -ER Orientation: proximal  -ER Location: arm  -ER    Dressing Appearance -- open to air  -BB open to air  -MF    Base -- -- non-blanchable  -MF    Periwound -- -- intact;blanchable  -MF    Periwound Temperature -- -- warm  -MF    Periwound Skin Turgor -- -- soft  -MF    Edges open  -BB (r) SC (t) BB (c) open  -BB open  -MF    Drainage Characteristics/Odor bleeding controlled  -BB (r) SC (t) BB (c) bleeding controlled  -BB --    Retired Wound - Properties Group Placement Date: 03/13/22  -ER Placement Time: 0524  -ER Side: Right  -ER Orientation: proximal  -ER Location: arm  -ER    Retired Wound - Properties Group Date first assessed: 03/13/22  -ER Time first assessed: 0524  -ER Side: Right  -ER Location: arm  -ER       Wound 03/13/22 0528 Left lower leg Abrasion    Wound - Properties Group Placement Date: 03/13/22  -ER Placement Time: 0528  -ER Side: Left  -ER Orientation: lower  -ER Location: leg  -ER Primary Wound Type: Abrasion  -ER    Dressing Appearance -- open to air  -BB open to air  -MF    Periwound -- -- intact;blanchable  -MF    Periwound Temperature -- -- warm  -MF    Retired Wound - Properties Group Placement Date: 03/13/22  -ER Placement Time: 0528  -ER Side: Left  -ER Orientation: lower  -ER Location: leg  -ER Primary Wound Type: Abrasion  -ER    Retired Wound - Properties Group Date first assessed: 03/13/22  -ER Time first assessed: 0528  -ER Side: Left  -ER Location: leg  -ER Primary Wound Type: Abrasion  -ER       Rash 03/13/22 0510 groin    Rash - Properties Group Placement Date: 03/13/22  -ER Placement Time: 0510  -ER Location: groin  -ER    Color -- red  -BB red  -MF    Characteristics crusted;dry;peeling  -BB (r) SC (t)  BB (c) crusted;dry;peeling  -BB dry;crusted;peeling  -    Care, Rash -- cleansed with;soap and water  topical cream applied  -BB --    Retired Rash - Properties Group Placement Date: 03/13/22  -ER Placement Time: 0510  -ER Location: groin  -ER    Retired Rash - Properties Group Date first assessed: 03/13/22  -ER Time first assessed: 0510  -ER Location: groin  -ER          User Key  (r) = Recorded By, (t) = Taken By, (c) = Cosigned By    Initials Name Provider Type    BB Brunner, Brittany, RN Registered Nurse    Jannet Boss RN Registered Nurse    Abbi Hooks RN Registered Nurse    Myrna Jeffrey, PCT Tech/[facility] [place]                  Assessment/Plan      Brief Patient Summary:    67-year-old female with history of alcoholic cirrhosis, breast/cervical cancer and chronic hypoxemic respiratory failure on 3L oxygen presented to [facility] ED for evaluation of weakness.  Patient was found to have low blood pressure, tachycardia, confusion and was recently started by PCP on Keflex for cellulitis.  Patient found with extensive skin excoriation of thighs, lower abdomen and coccygeal region.  Patient admitted for acute CHF, sepsis due to lower extremity cellulitis and hyponatremia.      aluminum sulfate-calcium acetate 1:20, 500 mL, Topical, Q8H  carvedilol, 3.125 mg, Oral, BID With Meals  cefTRIAXone Sodium-Dextrose, 1 g, Intravenous, Q24H  enoxaparin, 40 mg, Subcutaneous, Q24H  hydrocortisone-bacitracin-zinc oxide-nystatin, 1 application, Topical, TID  ipratropium-albuterol, 3 mL, Nebulization, 4x Daily - RT  LORazepam, 0.5 mg, Oral, Q8H  midodrine, 10 mg, Oral, TID AC  multivitamin, 1 tablet, Oral, Daily  nystatin, , Topical, Q12H  pantoprazole, 40 mg, Oral, Q AM  sodium chloride, 10 mL, Intravenous, Q12H  thiamine, 100 mg, Oral, Daily             Active Hospital Problems:  Active Hospital Problems    Diagnosis    • **CHF exacerbation (HCC)    • Dermatitis associated with moisture    •  Unstageable pressure ulcer of sacral region (HCC)    • Severe malnutrition (CMS/HCC)    • Hyponatremia with excess extracellular fluid volume    • Elevated liver enzymes    • Thrombocytopenia (HCC)    • Lactic acidosis    • SIRS (systemic inflammatory response syndrome) (HCC)    • Skin excoriation      Acute diastolic heart failure:  -s/p TTE 3/13/22--> LVEF 61-65%.  -Cardiology consult    Hypovolemic hyponatremia:   -Slowly improving with IVF  -Nephrology consulted    Hyperkalemia: Resolved  -s/p calcium gluconate, Lokelma    Sepsis due to lower extremity cellulitis:  -Continue Rocephin  -Wound care following    Alcoholic cirrhosis complicated with elevated LFTs, jaundice and thrombocytopenia:  -Check ammonia in the a.m.  -No evidence of bleeding  -Unknown if she follows with GI  -Ammonia within normal limits    Alcohol abuse:  -On CIWA protocol  -Continue multivitamins and thiamine    severe protein calorie malnutrition:  -Encouraged oral intake  -PT/OT following  -Mostly bedbound and has a caretaker at home    COPD/asthma:  -Not in exacerbation    Chronic hypoxemic respiratory failure:  -On 3L at home    GERD:  -Continue PPI    Tobacco dependence:  -We will offer nicotine patch      DVT prophylaxis:  Medical DVT prophylaxis orders are present.    CODE STATUS:    Medical Intervention Limits: NO intubation (DNI); NO cardioversion; NO antiarrhythmic drugs  Level Of Support Discussed With: Patient  Code Status (Patient has no pulse and is not breathing): No CPR (Do Not Attempt to Resuscitate)  Medical Interventions (Patient has pulse or is breathing): Limited Support      Disposition:  I expect patient to be discharged defer.    This patient has been examined wearing appropriate Personal Protective Equipment and discussed with nursing. 03/16/22      Electronically signed by Eloy Duke DO, 03/16/22, 14:13 EDT.  [facility] Hospitalist Team

## 2022-03-16 NOTE — PROGRESS NOTES
"[facility] PROGRESS NOTE     LOS: 3 days    Patient Care Team:  Provider, No Known as PCP - General      Subjective     Patient had dyspnea overnight.  IV fluid was stopped and she received IV Lasix this morning    Objective     Vital Sign Min/Max for last 24 hours  Temp:  [97.4 °F (36.3 °C)-98 °F (36.7 °C)] 97.4 °F (36.3 °C)  Heart Rate:  [63-83] 83  Resp:  [14-20] 20  BP: ()/() 153/118                       Flowsheet Rows    Flowsheet Row First Filed Value   Admission Height 165.1 cm (65\") Documented at 03/13/2022 0142   Admission Weight 53.2 kg (117 lb 4.6 oz) Documented at 03/13/2022 0142          No intake/output data recorded.  I/O last 3 completed shifts:  In: 1780 [P.O.:780; I.V.:1000]  Out: 375 [Urine:375]    Physical Exam:  Physical Exam    General Appearance: Chronically ill-appearing  Skin: warm and dry  HEENT: oral mucosa dry, nonicteric sclera  Neck: supple, no JVD  Lungs: Bilateral rhonchi   heart: RRR, normal S1 and S2  Abdomen: soft, nontender, nondistended  : no palpable bladder  Extremities: no edema, cyanosis or clubbing         LABS:  Lab Results   Component Value Date    CALCIUM 7.4 (L) 03/16/2022    PHOS 2.5 03/16/2022     Results from last 7 days   Lab Units 03/16/22  0413 03/15/22  0805 03/14/22  1236 03/13/22  1702 03/13/22  0631   MAGNESIUM mg/dL 1.9 1.4* 1.6  --  1.6   SODIUM mmol/L 128* 122* 122*   < > 116*   POTASSIUM mmol/L 4.9 4.8 4.7   < > 6.1*   CHLORIDE mmol/L 93* 88* 84*   < > 77*   CO2 mmol/L 33.0* 30.0* 36.0*   < > 35.0*   BUN mg/dL 10 11 11   < > 10   CREATININE mg/dL 0.54* 0.53* 0.59   < > 0.62   GLUCOSE mg/dL 96 102* 127*   < > 115*   CALCIUM mg/dL 7.4* 7.8* 8.5*   < > 8.6   WBC 10*3/mm3 3.20* 3.00* 5.80   < > 10.20   HEMOGLOBIN g/dL 9.0* 8.8* 9.9*   < > 11.1*   PLATELETS 10*3/mm3 68* 72* 84*   < > 114*   ALT (SGPT) U/L  --   --   --   --  64*   AST (SGOT) U/L  --   --   --   --  58*    < > = values in this interval not displayed.     Lab Results   Component " Value Date    CKTOTAL 42 03/14/2022    TROPONINT <0.010 03/13/2022     Estimated Creatinine Clearance: 66.3 mL/min (A) (by C-G formula based on SCr of 0.54 mg/dL (L)).  Results from last 7 days   Lab Units 03/14/22  1454   PH, ARTERIAL pH units 7.357   PO2 ART mm Hg 100.9   PCO2, ARTERIAL mm Hg 70.1*   HCO3 ART mmol/L 39.3*     Brief Urine Lab Results     None        WEIGHTS:     Wt Readings from Last 1 Encounters:   03/16/22 0509 59.9 kg (132 lb 0.9 oz)   03/13/22 0722 53.1 kg (117 lb)   03/13/22 0142 53.2 kg (117 lb 4.6 oz)       aluminum sulfate-calcium acetate 1:20, 500 mL, Topical, Q8H  carvedilol, 3.125 mg, Oral, BID With Meals  cefTRIAXone Sodium-Dextrose, 1 g, Intravenous, Q24H  enoxaparin, 40 mg, Subcutaneous, Q24H  hydrocortisone-bacitracin-zinc oxide-nystatin, 1 application, Topical, TID  ipratropium-albuterol, 3 mL, Nebulization, 4x Daily - RT  LORazepam, 0.5 mg, Oral, Q8H  midodrine, 10 mg, Oral, TID AC  multivitamin, 1 tablet, Oral, Daily  nystatin, , Topical, Q12H  pantoprazole, 40 mg, Oral, Q AM  sodium chloride, 10 mL, Intravenous, Q12H  thiamine, 100 mg, Oral, Daily           Assessment/Plan       1.  Hyponatremia  Secondary to renal hypoperfusion due to intravascular volume depletion and cirrhosis.  Improved to 128 this morning.  Off IV fluids now  2.  Hypotension.  Improved  3.  CHF.  Diastolic.  Chronic  4.  Alcoholic cirrhosis  5.  Hypomagnesemia.  Improved  6.  Hypophosphatemia.  Improved      Recs:  Continue oral midodrine  Repeating chest x-ray  May consider restarting gentle IV hydration    Salazar Santos MD  03/16/22  09:52 EDT

## 2022-03-16 NOTE — THERAPY TREATMENT NOTE
Subjective: Pt agreeable to therapeutic plan of care.    Objective:     Bed mobility - Mod-A and Assist x 2  Transfers - Max-A, Assist x 2 and with rolling walker; pt needed more than one attempt to achieve full standing  Ambulation - 3 pivoting steps Max-A, Assist x 2 and with rolling walker  BP pre-tx: 102/48  BP post-tx 84/47 ; nursing aware    Pain: 0 VAS  Education: Provided education on importance of mobility and skilled verbal / tactile cueing throughout intervention.     Assessment: Shanelle Solis presents with functional mobility impairments which indicate the need for skilled intervention. Pt more alert and appropriate today with her conversation. Tolerating session today without incident. Will continue to follow and progress as tolerated.     Plan/Recommendations:   Pt would benefit from Inpatient Rehabilitation placement at discharge from facility and requires no DME at discharge.   Pt desires Inpatient Rehabilitation placement at discharge. Pt cooperative; agreeable to therapeutic recommendations and plan of care.     Modified [place]: 4 = Moderately severe disability (Unable to attend to own bodily needs without assistance, and unable to walk unassisted)     Post-Tx Position: Up in Chair, Alarms activated and Call light and personal items within reach  PPE: gloves, surgical mask, eyewear protection

## 2022-03-16 NOTE — PLAN OF CARE
Bed mobility - Mod-A and Assist x 2  Transfers - Max-A, Assist x 2 and with rolling walker; pt needed more than one attempt to achieve full standing  Ambulation - 3 pivoting steps Max-A, Assist x 2 and with rolling walker  Pt more alert and appropriate today with her conversation.   Inpatient Rehabilitation placement at discharge from facility .

## 2022-03-16 NOTE — PLAN OF CARE
Goal Outcome Evaluation:         Pt. Working w/ PT This AM, check on PM and pt. Returned to bed and eating lunch. Will attempt as appropriate.

## 2022-03-17 LAB
ALBUMIN SERPL-MCNC: 2.4 G/DL (ref 3.5–5.2)
ANION GAP SERPL CALCULATED.3IONS-SCNC: 4 MMOL/L (ref 5–15)
BUN SERPL-MCNC: 9 MG/DL (ref 8–23)
BUN/CREAT SERPL: 18.8 (ref 7–25)
CALCIUM SPEC-SCNC: 7.7 MG/DL (ref 8.6–10.5)
CHLORIDE SERPL-SCNC: 92 MMOL/L (ref 98–107)
CO2 SERPL-SCNC: 34 MMOL/L (ref 22–29)
CREAT SERPL-MCNC: 0.48 MG/DL (ref 0.57–1)
EGFRCR SERPLBLD CKD-EPI 2021: 105.3 ML/MIN/1.73
GLUCOSE SERPL-MCNC: 111 MG/DL (ref 65–99)
MAGNESIUM SERPL-MCNC: 1.6 MG/DL (ref 1.6–2.4)
PHOSPHATE SERPL-MCNC: 1.9 MG/DL (ref 2.5–4.5)
POTASSIUM SERPL-SCNC: 4.1 MMOL/L (ref 3.5–5.2)
SODIUM SERPL-SCNC: 130 MMOL/L (ref 136–145)

## 2022-03-17 PROCEDURE — 97530 THERAPEUTIC ACTIVITIES: CPT

## 2022-03-17 PROCEDURE — 25010000002 ENOXAPARIN PER 10 MG: Performed by: INTERNAL MEDICINE

## 2022-03-17 PROCEDURE — 94799 UNLISTED PULMONARY SVC/PX: CPT

## 2022-03-17 PROCEDURE — 99231 SBSQ HOSP IP/OBS SF/LOW 25: CPT | Performed by: HOSPITALIST

## 2022-03-17 PROCEDURE — 97535 SELF CARE MNGMENT TRAINING: CPT

## 2022-03-17 PROCEDURE — 99232 SBSQ HOSP IP/OBS MODERATE 35: CPT | Performed by: INTERNAL MEDICINE

## 2022-03-17 PROCEDURE — 80069 RENAL FUNCTION PANEL: CPT | Performed by: INTERNAL MEDICINE

## 2022-03-17 PROCEDURE — 83735 ASSAY OF MAGNESIUM: CPT | Performed by: HOSPITALIST

## 2022-03-17 PROCEDURE — 25010000002 CEFTRIAXONE SODIUM-DEXTROSE 1-3.74 GM-%(50ML) RECONSTITUTED SOLUTION: Performed by: HOSPITALIST

## 2022-03-17 RX ORDER — TORSEMIDE 10 MG/1
40 TABLET ORAL DAILY
Status: DISCONTINUED | OUTPATIENT
Start: 2022-03-17 | End: 2022-03-18 | Stop reason: HOSPADM

## 2022-03-17 RX ADMIN — PANTOPRAZOLE SODIUM 40 MG: 40 TABLET, DELAYED RELEASE ORAL at 08:37

## 2022-03-17 RX ADMIN — CALCIUM ACETATE MONOHYDRATE AND ALUMINUM SULFATE TETRADECAHYDRATE 500 ML: 952; 1347 POWDER, FOR SOLUTION TOPICAL at 16:17

## 2022-03-17 RX ADMIN — TORSEMIDE 40 MG: 10 TABLET ORAL at 12:30

## 2022-03-17 RX ADMIN — CALCIUM ACETATE MONOHYDRATE AND ALUMINUM SULFATE TETRADECAHYDRATE 500 ML: 952; 1347 POWDER, FOR SOLUTION TOPICAL at 08:31

## 2022-03-17 RX ADMIN — Medication 10 ML: at 08:40

## 2022-03-17 RX ADMIN — NYSTATIN: 100000 POWDER TOPICAL at 08:39

## 2022-03-17 RX ADMIN — ENOXAPARIN SODIUM 40 MG: 40 INJECTION SUBCUTANEOUS at 16:17

## 2022-03-17 RX ADMIN — Medication 2 PACKET: at 08:39

## 2022-03-17 RX ADMIN — MIDODRINE HYDROCHLORIDE 10 MG: 5 TABLET ORAL at 12:30

## 2022-03-17 RX ADMIN — MIDODRINE HYDROCHLORIDE 10 MG: 5 TABLET ORAL at 08:37

## 2022-03-17 RX ADMIN — THERA TABS 1 TABLET: TAB at 08:37

## 2022-03-17 RX ADMIN — Medication 100 MG: at 08:37

## 2022-03-17 RX ADMIN — Medication 10 ML: at 19:25

## 2022-03-17 RX ADMIN — NYSTATIN: 100000 POWDER TOPICAL at 16:18

## 2022-03-17 RX ADMIN — HYDROCORTISONE 1 APPLICATION: 1 OINTMENT TOPICAL at 19:24

## 2022-03-17 RX ADMIN — IPRATROPIUM BROMIDE AND ALBUTEROL SULFATE 3 ML: .5; 3 SOLUTION RESPIRATORY (INHALATION) at 11:19

## 2022-03-17 RX ADMIN — HYDROCORTISONE 1 APPLICATION: 1 OINTMENT TOPICAL at 08:40

## 2022-03-17 RX ADMIN — CARVEDILOL 3.12 MG: 3.12 TABLET, FILM COATED ORAL at 16:17

## 2022-03-17 RX ADMIN — IPRATROPIUM BROMIDE AND ALBUTEROL SULFATE 3 ML: .5; 3 SOLUTION RESPIRATORY (INHALATION) at 18:53

## 2022-03-17 RX ADMIN — MIDODRINE HYDROCHLORIDE 10 MG: 5 TABLET ORAL at 16:18

## 2022-03-17 RX ADMIN — HYDROCORTISONE 1 APPLICATION: 1 OINTMENT TOPICAL at 16:17

## 2022-03-17 RX ADMIN — IPRATROPIUM BROMIDE AND ALBUTEROL SULFATE 3 ML: .5; 3 SOLUTION RESPIRATORY (INHALATION) at 15:01

## 2022-03-17 RX ADMIN — CEFTRIAXONE 1 G: 1 INJECTION, SOLUTION INTRAVENOUS at 05:57

## 2022-03-17 NOTE — THERAPY TREATMENT NOTE
Subjective: Pt agreeable to therapeutic plan of care.  Cognition: oriented to Person, Place and Time    Objective:   Grooming: SBA    Upper Body Dressing: CGA and Min-A      Pain: 0 VAS  Education: Provided education on importance of mobility and skilled verbal / tactile cueing throughout intervention.     Assessment: Shanelle Solis presents with ADL impairments below baseline abilities which indicate the need for continued skilled intervention while inpatient. Pt completed g/h task of washing face and brushing hair with SBA sitting up in chair.  Pt requires cues to re-don 02.  Pt completes UBD task of doffing gown and donning clean gown with MIN A-CGA.  Pt completed BUE AROM exercises 3 x 10 focusing on improving strength and activity tolerance in order to facilitate independence and safety with functional tasks.  Pt requires frequency rest breaks due to decreased activity tolerance.  Tolerating session today without incident. Will continue to follow and progress as tolerated.     Plan/Recommendations:   Pt would benefit from Inpatient Rehabilitation placement at discharge from facility.   Pt desires Inpatient Rehabilitation placement at discharge. Pt cooperative; agreeable to therapeutic recommendations and plan of care.     Post-Tx Position: Up in Chair, Alarms activated and Call light and personal items within reach  PPE: gloves, surgical mask, eyewear protection

## 2022-03-17 NOTE — PLAN OF CARE
Goal Outcome Evaluation:     Shanelle Solis presents with ADL impairments below baseline abilities which indicate the need for continued skilled intervention while inpatient. Pt completed g/h task of washing face and brushing hair with SBA sitting up in chair.  Pt requires cues to re-don 02.  Pt completes UBD task of doffing gown and donning clean gown with MIN A-CGA.  Pt completed BUE AROM exercises 3 x 10 focusing on improving strength and activity tolerance in order to facilitate independence and safety with functional tasks.  Pt requires frequency rest breaks due to decreased activity tolerance.  Tolerating session today without incident. Will continue to follow and progress as tolerated.

## 2022-03-17 NOTE — PROGRESS NOTES
[facility] Daily Progress Note    Patient Name: Shanelle Solis  : 1955  MRN: [id number]  Primary Care Physician:  Provider, No Known  Date of admission: 3/13/2022      Subjective      Chief Complaint: None      Patient Reports:    3/13/22: Patient poor historian.  Given Ativan for CIWA.  Nephrology and cardiology consulted.    3/14/22: Low BP.  On midodrine.  Afebrile.  Wound care consulted.  ABG ordered in the afternoon to evaluate confusion.  Likely chronic CO2 retention.    3/15/22: No complaints.  Able to answer questions.  Will replace electrolytes.    3/16/22:  no complaints.  Awaiting placement.  3/17/22: Encouraged oral intake.    Review of Systems   All other systems reviewed and are negative.         Objective      Vitals:   Temp:  [92.5 °F (33.6 °C)-97.5 °F (36.4 °C)] 92.5 °F (33.6 °C)  Heart Rate:  [63-89] 83  Resp:  [16-17] 16  BP: ()/(48-65) 95/63  Flow (L/min):  [3-6] 3    Physical Exam  HENT:      Head: Normocephalic.      Mouth/Throat:      Mouth: Mucous membranes are moist.   Eyes:      General: No scleral icterus.     Extraocular Movements: Extraocular movements intact.      Pupils: Pupils are equal, round, and reactive to light.   Cardiovascular:      Rate and Rhythm: Normal rate.   Pulmonary:      Effort: Pulmonary effort is normal.   Abdominal:      General: Bowel sounds are normal.   Musculoskeletal:      Cervical back: Normal range of motion.      Comments: Erythema of thighs, posterior thigh and lower abdomen   Neurological:      Mental Status: She is alert.             Result Review    Result Review:  I have personally reviewed the results from the time of this admission to 3/17/2022 14:03 EDT and agree with these findings:  [x]  Laboratory  []  Microbiology  [x]  Radiology  []  EKG/Telemetry   []  Cardiology/Vascular   []  Pathology  [x]  Old records  []  Other:      Wounds (last 24 hours)     LDA Wound     Row Name 22 0840  03/16/22 1915 03/16/22 1601       Wound 03/13/22 0506 Left gluteal Pressure Injury    Wound - Properties Group Placement Date: 03/13/22  -ER Placement Time: 0506  -ER Side: Left  -ER Location: gluteal  -ER Primary Wound Type: Pressure inj  -ER    Pressure Injury Stage 3  -AR 3  -BB --    Dressing Appearance open to air  -AR open to air  -BB --    Closure Open to air  -AR Open to air  -BB --    Base red;non-blanchable;slough  -AR red;non-blanchable;slough  -BB --    Periwound excoriated  -AR excoriated  -BB --    Periwound Temperature warm  -AR -- --    Periwound Skin Turgor soft  -AR -- --    Edges open  -AR -- --    Dressing Care open to air  -AR -- --    Periwound Care barrier ointment applied  -AR -- --    Retired Wound - Properties Group Placement Date: 03/13/22  -ER Placement Time: 0506  -ER Side: Left  -ER Location: gluteal  -ER Primary Wound Type: Pressure inj  -ER    Retired Wound - Properties Group Date first assessed: 03/13/22  -ER Time first assessed: 0506  -ER Side: Left  -ER Location: gluteal  -ER Primary Wound Type: Pressure inj  -ER       Wound 03/13/22 0507 Right gluteal Pressure Injury    Wound - Properties Group Placement Date: 03/13/22  -ER Placement Time: 0507  -ER Side: Right  -ER Location: gluteal  -ER Primary Wound Type: Pressure inj  -ER    Wound Image View All Images -- -- View Images  -CS    Pressure Injury Stage 3  -AR 3  -BB --    Dressing Appearance open to air  -AR open to air  -BB --    Closure Open to air  -AR Open to air  -BB --    Base -- red;non-blanchable;slough  -BB --    Periwound excoriated  -AR excoriated  -BB --    Periwound Temperature warm  -AR -- --    Periwound Skin Turgor soft  -AR -- --    Dressing Care open to air  -AR -- --    Periwound Care barrier ointment applied  -AR -- --    Retired Wound - Properties Group Placement Date: 03/13/22  -ER Placement Time: 0507  -ER Side: Right  -ER Location: gluteal  -ER Primary Wound Type: Pressure inj  -ER    Retired Wound -  Properties Group Date first assessed: 03/13/22  -ER Time first assessed: 0507  -ER Side: Right  -ER Location: gluteal  -ER Primary Wound Type: Pressure inj  -ER       Wound 03/13/22 0524 Right proximal arm    Wound - Properties Group Placement Date: 03/13/22  -ER Placement Time: 0524  -ER Side: Right  -ER Orientation: proximal  -ER Location: arm  -ER    Dressing Appearance open to air  -AR open to air  -BB --    Closure Open to air  -AR Open to air  -BB --    Base red  -AR red  -BB --    Periwound intact;blanchable  -AR -- --    Periwound Temperature warm  -AR -- --    Periwound Skin Turgor soft  -AR -- --    Edges open  -AR -- --    Drainage Characteristics/Odor bleeding controlled  -AR -- --    Drainage Amount none  -AR -- --    Retired Wound - Properties Group Placement Date: 03/13/22  -ER Placement Time: 0524  -ER Side: Right  -ER Orientation: proximal  -ER Location: arm  -ER    Retired Wound - Properties Group Date first assessed: 03/13/22  -ER Time first assessed: 0524  -ER Side: Right  -ER Location: arm  -ER       Wound 03/13/22 0528 Left lower leg Abrasion    Wound - Properties Group Placement Date: 03/13/22  -ER Placement Time: 0528  -ER Side: Left  -ER Orientation: lower  -ER Location: leg  -ER Primary Wound Type: Abrasion  -ER    Dressing Appearance open to air  -AR open to air  -BB --    Closure Open to air  -AR Open to air  -BB --    Base scab  -AR scab  -BB --    Periwound intact;blanchable  -AR -- --    Periwound Temperature warm  -AR -- --    Dressing Care open to air  -AR -- --    Retired Wound - Properties Group Placement Date: 03/13/22  -ER Placement Time: 0528  -ER Side: Left  -ER Orientation: lower  -ER Location: leg  -ER Primary Wound Type: Abrasion  -ER    Retired Wound - Properties Group Date first assessed: 03/13/22  -ER Time first assessed: 0528  -ER Side: Left  -ER Location: leg  -ER Primary Wound Type: Abrasion  -ER       Rash 03/13/22 0510 groin    Rash - Properties Group Placement Date:  03/13/22  -ER Placement Time: 0510  -ER Location: groin  -ER    Color -- red;other (see comments)  excoriated  -BB --    Characteristics -- dry;peeling  -BB --    Retired Rash - Properties Group Placement Date: 03/13/22  -ER Placement Time: 0510  -ER Location: groin  -ER    Retired Rash - Properties Group Date first assessed: 03/13/22  -ER Time first assessed: 0510  -ER Location: groin  -ER    Row Name 03/16/22 1600             Wound 03/13/22 0506 Left gluteal Pressure Injury    Wound - Properties Group Placement Date: 03/13/22  -ER Placement Time: 0506  -ER Side: Left  -ER Location: gluteal  -ER Primary Wound Type: Pressure inj  -ER      Pressure Injury Stage 3  -CS      Dressing Appearance open to air  -CS      Closure Open to air  -CS      Base red;slough  -CS      Periwound excoriated;blanchable  -CS      Periwound Temperature warm  -CS      Periwound Skin Turgor soft  -CS      Edges open  -CS      Dressing Care open to air;skin barrier agent applied  -CS      Periwound Care barrier ointment applied  -CS      Retired Wound - Properties Group Placement Date: 03/13/22  -ER Placement Time: 0506  -ER Side: Left  -ER Location: gluteal  -ER Primary Wound Type: Pressure inj  -ER      Retired Wound - Properties Group Date first assessed: 03/13/22  -ER Time first assessed: 0506  -ER Side: Left  -ER Location: gluteal  -ER Primary Wound Type: Pressure inj  -ER              Wound 03/13/22 0507 Right gluteal Pressure Injury    Wound - Properties Group Placement Date: 03/13/22  -ER Placement Time: 0507  -ER Side: Right  -ER Location: gluteal  -ER Primary Wound Type: Pressure inj  -ER      Pressure Injury Stage 3  -CS      Closure Open to air  -CS      Base red;slough  -CS      Periwound blanchable;excoriated  -CS      Periwound Temperature warm  -CS      Periwound Skin Turgor soft  -CS      Dressing Care open to air;skin barrier agent applied  -CS      Periwound Care barrier ointment applied  -CS      Retired Wound -  Properties Group Placement Date: 03/13/22  -ER Placement Time: 0507  -ER Side: Right  -ER Location: gluteal  -ER Primary Wound Type: Pressure inj  -ER      Retired Wound - Properties Group Date first assessed: 03/13/22  -ER Time first assessed: 0507  -ER Side: Right  -ER Location: gluteal  -ER Primary Wound Type: Pressure inj  -ER              Wound 03/13/22 0524 Right proximal arm    Wound - Properties Group Placement Date: 03/13/22  -ER Placement Time: 0524  -ER Side: Right  -ER Orientation: proximal  -ER Location: arm  -ER      Dressing Appearance open to air  -CS      Closure Open to air  -CS      Base non-blanchable  -CS      Periwound intact;blanchable  -CS      Periwound Temperature warm  -CS      Periwound Skin Turgor soft  -CS      Edges open  -CS      Drainage Amount none  -CS      Periwound Care dry periwound area maintained  -CS      Retired Wound - Properties Group Placement Date: 03/13/22  -ER Placement Time: 0524  -ER Side: Right  -ER Orientation: proximal  -ER Location: arm  -ER      Retired Wound - Properties Group Date first assessed: 03/13/22  -ER Time first assessed: 0524  -ER Side: Right  -ER Location: arm  -ER              Wound 03/13/22 0528 Left lower leg Abrasion    Wound - Properties Group Placement Date: 03/13/22  -ER Placement Time: 0528  -ER Side: Left  -ER Orientation: lower  -ER Location: leg  -ER Primary Wound Type: Abrasion  -ER      Dressing Appearance open to air  -CS      Closure Open to air  -CS      Base scab  -CS      Periwound intact;blanchable  -CS      Periwound Temperature warm  -CS      Dressing Care open to air  -CS      Periwound Care dry periwound area maintained  -CS      Retired Wound - Properties Group Placement Date: 03/13/22  -ER Placement Time: 0528  -ER Side: Left  -ER Orientation: lower  -ER Location: leg  -ER Primary Wound Type: Abrasion  -ER      Retired Wound - Properties Group Date first assessed: 03/13/22  -ER Time first assessed: 0528  -ER Side: Left  -ER  Location: leg  -ER Primary Wound Type: Abrasion  -ER              Rash 03/13/22 0510 groin    Rash - Properties Group Placement Date: 03/13/22  -ER Placement Time: 0510 -ER Location: groin  -ER      Color red;other (see comments)  excoriation  -CS      Characteristics dry;peeling  -CS      Care, Rash other (see comments)  barrier cream applied  -CS      Retired Rash - Properties Group Placement Date: 03/13/22  -ER Placement Time: 0510  -ER Location: groin  -ER      Retired Rash - Properties Group Date first assessed: 03/13/22  -ER Time first assessed: 0510  -ER Location: groin  -ER            User Key  (r) = Recorded By, (t) = Taken By, (c) = Cosigned By    Initials Name Provider Type    BB Linda Patino, RN Registered Nurse    Lisbeth Romano LPN Licensed Nurse    Radha Fountain RN Registered Nurse    Abbi Hooks RN Registered Nurse                  Assessment/Plan      Brief Patient Summary:    67-year-old female with history of alcoholic cirrhosis, breast/cervical cancer and chronic hypoxemic respiratory failure on 3L oxygen presented to [facility] ED for evaluation of weakness.  Patient was found to have low blood pressure, tachycardia, confusion and was recently started by PCP on Keflex for cellulitis.  Patient found with extensive skin excoriation of thighs, lower abdomen and coccygeal region.  Patient admitted for acute CHF, sepsis due to lower extremity cellulitis and hyponatremia.      aluminum sulfate-calcium acetate 1:20, 500 mL, Topical, Q8H  carvedilol, 3.125 mg, Oral, BID With Meals  cefTRIAXone Sodium-Dextrose, 1 g, Intravenous, Q24H  enoxaparin, 40 mg, Subcutaneous, Q24H  hydrocortisone-bacitracin-zinc oxide-nystatin, 1 application, Topical, TID  ipratropium-albuterol, 3 mL, Nebulization, 4x Daily - RT  midodrine, 10 mg, Oral, TID AC  multivitamin, 1 tablet, Oral, Daily  nystatin, , Topical, Q12H  pantoprazole, 40 mg, Oral, Q AM  sodium chloride, 10 mL, Intravenous,  Q12H  thiamine, 100 mg, Oral, Daily  torsemide, 40 mg, Oral, Daily             Active Hospital Problems:  Active Hospital Problems    Diagnosis    • **CHF exacerbation (HCC)    • Dermatitis associated with moisture    • Unstageable pressure ulcer of sacral region (HCC)    • Severe malnutrition (CMS/HCC)    • Hyponatremia with excess extracellular fluid volume    • Elevated liver enzymes    • Thrombocytopenia (HCC)    • Lactic acidosis    • SIRS (systemic inflammatory response syndrome) (HCC)    • Skin excoriation      Acute diastolic heart failure:  -s/p TTE 3/13/22--> LVEF 61-65%.  -Cardiology consult    Multifactorial hyponatremia: Slowly improving  -Nephrology consulted    Hyperkalemia: Resolved  -s/p calcium gluconate, Lokelma    Sepsis due to lower extremity cellulitis:  -Continue Rocephin  -Wound care following    Alcoholic cirrhosis complicated with elevated LFTs, jaundice and thrombocytopenia:  -Check ammonia in the a.m.  -No evidence of bleeding  -Unknown if she follows with GI  -Ammonia within normal limits    Alcohol abuse:  -On CIWA protocol  -Continue multivitamins and thiamine    severe protein calorie malnutrition:  -Encouraged oral intake  -PT/OT following  -Mostly bedbound and has a caretaker at home    COPD/asthma:  -Not in exacerbation    Chronic hypoxemic respiratory failure:  -On 3L at home    GERD:  -Continue PPI    Tobacco dependence:  -We will offer nicotine patch      DVT prophylaxis:  Medical DVT prophylaxis orders are present.    CODE STATUS:    Medical Intervention Limits: NO intubation (DNI); NO cardioversion; NO antiarrhythmic drugs  Level Of Support Discussed With: Patient  Code Status (Patient has no pulse and is not breathing): No CPR (Do Not Attempt to Resuscitate)  Medical Interventions (Patient has pulse or is breathing): Limited Support      Disposition:  I expect patient to be discharged defer.    This patient has been examined wearing appropriate Personal Protective Equipment  and discussed with nursing. 03/17/22      Electronically signed by Eloy Duke DO, 03/17/22, 14:03 EDT.  [facility]

## 2022-03-17 NOTE — PROGRESS NOTES
"[facility] PROGRESS NOTE     LOS: 4 days    Patient Care Team:  Provider, No Known as PCP - General      Subjective     Patient resting comfortably.  Still has dyspnea but improving  Denied any abdominal pain, nausea or vomiting    Objective     Vital Sign Min/Max for last 24 hours  Temp:  [97.4 °F (36.3 °C)-97.6 °F (36.4 °C)] 97.4 °F (36.3 °C)  Heart Rate:  [63-94] 87  Resp:  [14-18] 17  BP: ()/(47-65) 94/57                       Flowsheet Rows    Flowsheet Row First Filed Value   Admission Height 165.1 cm (65\") Documented at 03/13/2022 0142   Admission Weight 53.2 kg (117 lb 4.6 oz) Documented at 03/13/2022 0142          No intake/output data recorded.  I/O last 3 completed shifts:  In: 1470 [P.O.:420; I.V.:1000; IV Piggyback:50]  Out: 800 [Urine:800]    Physical Exam:  Physical Exam    General Appearance: Chronically ill-appearing  Skin: warm and dry  HEENT: oral mucosa dry, nonicteric sclera  Neck: supple, no JVD  Lungs: Bilateral rhonchi   heart: RRR, normal S1 and S2  Abdomen: soft, nontender, nondistended  : no palpable bladder  Extremities: no edema, cyanosis or clubbing         LABS:  Lab Results   Component Value Date    CALCIUM 7.7 (L) 03/17/2022    PHOS 1.9 (C) 03/17/2022     Results from last 7 days   Lab Units 03/17/22  0650 03/17/22  0020 03/16/22  0413 03/15/22  0805 03/14/22  1236 03/13/22  1702 03/13/22  0631   MAGNESIUM mg/dL  --  1.6 1.9 1.4* 1.6  --  1.6   SODIUM mmol/L 130*  --  128* 122* 122*   < > 116*   POTASSIUM mmol/L 4.1  --  4.9 4.8 4.7   < > 6.1*   CHLORIDE mmol/L 92*  --  93* 88* 84*   < > 77*   CO2 mmol/L 34.0*  --  33.0* 30.0* 36.0*   < > 35.0*   BUN mg/dL 9  --  10 11 11   < > 10   CREATININE mg/dL 0.48*  --  0.54* 0.53* 0.59   < > 0.62   GLUCOSE mg/dL 111*  --  96 102* 127*   < > 115*   CALCIUM mg/dL 7.7*  --  7.4* 7.8* 8.5*   < > 8.6   WBC 10*3/mm3  --   --  3.20* 3.00* 5.80   < > 10.20   HEMOGLOBIN g/dL  --   --  9.0* 8.8* 9.9*   < > 11.1*   PLATELETS 10*3/mm3  --   " --  68* 72* 84*   < > 114*   ALT (SGPT) U/L  --   --   --   --   --   --  64*   AST (SGOT) U/L  --   --   --   --   --   --  58*    < > = values in this interval not displayed.     Lab Results   Component Value Date    CKTOTAL 42 03/14/2022    TROPONINT <0.010 03/13/2022     Estimated Creatinine Clearance: 63.2 mL/min (A) (by C-G formula based on SCr of 0.48 mg/dL (L)).  Results from last 7 days   Lab Units 03/14/22  1454   PH, ARTERIAL pH units 7.357   PO2 ART mm Hg 100.9   PCO2, ARTERIAL mm Hg 70.1*   HCO3 ART mmol/L 39.3*     Brief Urine Lab Results     None        WEIGHTS:     Wt Readings from Last 1 Encounters:   03/17/22 0431 57.1 kg (125 lb 12.8 oz)   03/16/22 0509 59.9 kg (132 lb 0.9 oz)   03/13/22 0722 53.1 kg (117 lb)   03/13/22 0142 53.2 kg (117 lb 4.6 oz)       aluminum sulfate-calcium acetate 1:20, 500 mL, Topical, Q8H  carvedilol, 3.125 mg, Oral, BID With Meals  cefTRIAXone Sodium-Dextrose, 1 g, Intravenous, Q24H  enoxaparin, 40 mg, Subcutaneous, Q24H  hydrocortisone-bacitracin-zinc oxide-nystatin, 1 application, Topical, TID  ipratropium-albuterol, 3 mL, Nebulization, 4x Daily - RT  midodrine, 10 mg, Oral, TID AC  multivitamin, 1 tablet, Oral, Daily  nystatin, , Topical, Q12H  pantoprazole, 40 mg, Oral, Q AM  sodium chloride, 10 mL, Intravenous, Q12H  thiamine, 100 mg, Oral, Daily           Assessment/Plan       1.  Hyponatremia  Improving with diuresis.  Seems to be secondary to CHF and cirrhosis  2.  Hypotension.  Improved  3.  CHF.  Diastolic.  Chronic  4.  Alcoholic cirrhosis  5.  Hypomagnesemia.  Improved  6.  Hypophosphatemia.       Recs:  Continue oral midodrine  Start oral diuretics    Salazar Santos MD  03/17/22  10:48 EDT

## 2022-03-17 NOTE — PLAN OF CARE
Goal Outcome Evaluation:        Bed mobility - Mod-A supine to sit pt demonstrated good static sitting balance.    Transfers - Max-A and Assist x 2 sit pivot to the bs chair.  No walker available in the room to stand up to a roll walker. Pt then stood about 4 more times after transfer to the chair.  Pt sat on a bed pan to use the bathroom. (pt decined to use the bedside commode)  Ambulation -  N/A    Pt would benefit from Inpatient Rehabilitation placement at discharge from facility  Pt desires Inpatient Rehabilitation placement at discharge. Pt cooperative; agreeable to therapeutic recommendations and plan of care.

## 2022-03-17 NOTE — PROGRESS NOTES
Cardiology Progress  Note      Patient Care Team:  Provider, No Known as PCP - General    PATIENT IDENTIFICATION  Name: Shanelle Solis  Age: 67 y.o.  Sex: female  :  1955  MRN: [id number]             REASON FOR FOLLOW-UP:  Fluid overload/congestive heart failure        SUBJECTIVE    Patient seen and examined, chart and labs reviewed.  Patient sitting in chair appears comfortable, normal respiratory effort.   Rhythm remains sinus at 77.  She denies any chest discomfort, palpitations, nausea or vomiting.  Blood pressure still low with systolic readings 80s-90s.      REVIEW OF SYSTEMS:  Pertinent items are noted in HPI, all other systems reviewed and negative    OBJECTIVE   Sodium 130  Phosphorus 1.9  ASSESSMENT  Acute heart failure with preserved ejection fraction  Hyponatremia  Hyper kalemia  Altered mental status-resolved  Hypotension/tachycardia-improved  Lower extremity cellulitis  Sepsis with lactic acidosis  EtOH abuse with cirrhosis  COPD  GERD  History of breast cancer  Tobacco dependence disorder  Generalized weakness          RECOMMENDATIONS  TTE with EF 61-65%, RA mod dilated, technically difficult study, valvular structures not well visualized.  No evidence of pericardial effusion.  Nephrology following.  Patient receiving empiric antibiotic coverage for cellulitis  CIWA protocol initiated  Replacing electrolytes as needed  Monitor renal function and electrolytes and replace as needed  Continue BB as blood pressure tolerates    2022  Diastolic heart failure  Heart failure with preserved ejection fraction  Electrolyte abnormalities  Altered mental status  Lactic acidosis  Alcohol abuse  Frail status  Continue supportive care  Discussed with the patient at bedside  Intermittent low blood pressure  Patient is currently on midodrine  Hyponatremia  Agree with holding diuretics  Discussed with patient at bedside    Denies any new cardiac symptoms  Blood pressure is somewhat better  "systolic blood pressure 95 diastolic 60 with a mean pressure of 70  Heart rate is 80 bpm respiratory rate 16  Electrolytes are still abnormal with low sodium low phosphorus  Renal function is stable  Patient was started back on p.o. diuretics today  Continue midodrine for blood pressure support  Patient is currently on Coreg 3.125 mg p.o. twice daily  Midodrine 10 mg p.o. 3 times a day  Torsemide 40 mg p.o. once a day  Stable from cardiac standpoint          Chart and labs reviewed.  History and exam findings are verified with above changes noted.  Assessment and plan notated by APC after being formulated by attending consultant.  Note that greater than 50% of the time spent in care of the patient was provided by attending consultant.        Vital Signs  Visit Vitals  BP 95/63 (BP Location: Right arm, Patient Position: Lying)   Pulse 83   Temp 92.5 °F (33.6 °C) (Axillary)   Resp 16   Ht 165.1 cm (65\")   Wt 57.1 kg (125 lb 12.8 oz)   SpO2 (!) 87%   BMI 20.93 kg/m²     Oxygen Therapy  SpO2: (!) 87 %  Pulse Oximetry Type: Intermittent  Device (Oxygen Therapy): nasal cannula  Flow (L/min): 3  Oxygen Concentration (%): 40  Flowsheet Rows    Flowsheet Row First Filed Value   Admission Height 165.1 cm (65\") Documented at 03/13/2022 0142   Admission Weight 53.2 kg (117 lb 4.6 oz) Documented at 03/13/2022 0142        Intake & Output (last 3 days)       03/14 0701  03/15 0700 03/15 0701  03/16 0700 03/16 0701  03/17 0700 03/17 0701  03/18 0700    P.O. 630 420 120     I.V. (mL/kg)  1000 (16.7)      IV Piggyback   50     Total Intake(mL/kg) 630 (11.9) 1420 (23.7) 170 (3)     Urine (mL/kg/hr)  375 (0.3) 500 (0.4)     Total Output  375 500     Net +630 +1045 -330             Urine Unmeasured Occurrence  2 x 3 x 1 x        Lines, Drains & Airways     Active LDAs     Name Placement date Placement time Site Days    Peripheral IV 03/13/22 0400 Anterior;Right Forearm 03/13/22  0400  placed from other hospital  Forearm  1    External " "Urinary Catheter 03/13/22  1157  --  1                       BP 95/63 (BP Location: Right arm, Patient Position: Lying)   Pulse 83   Temp 92.5 °F (33.6 °C) (Axillary)   Resp 16   Ht 165.1 cm (65\")   Wt 57.1 kg (125 lb 12.8 oz)   SpO2 (!) 87%   BMI 20.93 kg/m²   Intake/Output last 3 shifts:  I/O last 3 completed shifts:  In: 1470 [P.O.:420; I.V.:1000; IV Piggyback:50]  Out: 800 [Urine:800]  Intake/Output this shift:  No intake/output data recorded.    PHYSICAL EXAM:  General:          Well-developed, thin 67-year-old female who is alert, cooperative, no distress, appears stated age  Head:              Normocephalic, atraumatic, mucous membranes moist  Eyes:               Conjunctiva/corneas clear, EOM's intact     Neck:              Supple,  no bruit  Lungs:            Very coarse to auscultation with expiratory wheezes bilaterally, decreased airflow.  Chest wall:     No tenderness  Heart::             Regular rate and rhythm, S1 and S2 normal, 1/6 holosystolic murmur.  No rub or gallop  Abdomen:      Soft, non-tender, nondistended bowel sounds active  Extremities:    Trace edema to lower extremities  Pulses:           Diminished pedal pulses.  Skin:                No rashes or lesions  Neuro/psych: A&O x3. CN II through XII are grossly intact with flat affect      Scheduled Meds:      aluminum sulfate-calcium acetate 1:20, 500 mL, Topical, Q8H  carvedilol, 3.125 mg, Oral, BID With Meals  cefTRIAXone Sodium-Dextrose, 1 g, Intravenous, Q24H  enoxaparin, 40 mg, Subcutaneous, Q24H  hydrocortisone-bacitracin-zinc oxide-nystatin, 1 application, Topical, TID  ipratropium-albuterol, 3 mL, Nebulization, 4x Daily - RT  midodrine, 10 mg, Oral, TID AC  multivitamin, 1 tablet, Oral, Daily  nystatin, , Topical, Q12H  pantoprazole, 40 mg, Oral, Q AM  sodium chloride, 10 mL, Intravenous, Q12H  thiamine, 100 mg, Oral, Daily  torsemide, 40 mg, Oral, Daily        Continuous Infusions:         PRN Meds:    •  albuterol  •  " magnesium sulfate **OR** magnesium sulfate **OR** magnesium sulfate  •  potassium & sodium phosphates **OR** potassium & sodium phosphates  •  potassium chloride **OR** potassium chloride **OR** potassium chloride  •  sodium chloride        Results Review:     I reviewed the patient's new clinical results.    CBC    Results from last 7 days   Lab Units 03/16/22  0413 03/15/22  0805 03/14/22  1236 03/14/22  0521 03/13/22  0631   WBC 10*3/mm3 3.20* 3.00* 5.80 5.30 10.20   HEMOGLOBIN g/dL 9.0* 8.8* 9.9* 9.4* 11.1*   PLATELETS 10*3/mm3 68* 72* 84* 77* 114*     Cr Clearance Estimated Creatinine Clearance: 63.2 mL/min (A) (by C-G formula based on SCr of 0.48 mg/dL (L)).  Coag   Results from last 7 days   Lab Units 03/13/22  0631   INR  1.21*     HbA1C No results found for: HGBA1C  Blood Glucose   Glucose   Date/Time Value Ref Range Status   03/14/2022 1446 127 (H) 70 - 105 mg/dL Final     Comment:     Serial Number: [id number]:  [id number]     Infection   Results from last 7 days   Lab Units 03/13/22  0635 03/13/22  0631   BLOODCX  No growth at 4 days No growth at 4 days     CMP   Results from last 7 days   Lab Units 03/17/22  0650 03/16/22  0413 03/15/22  0805 03/14/22  1236 03/14/22  0521 03/13/22  2146 03/13/22  1702 03/13/22  0631   SODIUM mmol/L 130* 128* 122* 122* 122* 120* 119* 116*   POTASSIUM mmol/L 4.1 4.9 4.8 4.7 4.7 4.8 5.1  5.1 6.1*   CHLORIDE mmol/L 92* 93* 88* 84* 83* 80* 80* 77*   CO2 mmol/L 34.0* 33.0* 30.0* 36.0* 39.0* 35.0* 36.0* 35.0*   BUN mg/dL 9 10 11 11 11 10 11 10   CREATININE mg/dL 0.48* 0.54* 0.53* 0.59 0.51* 0.61 0.61 0.62   GLUCOSE mg/dL 111* 96 102* 127* 101* 103* 140* 115*   ALBUMIN g/dL 2.40* 1.80* 1.80*  --  2.00* 2.10* 2.10* 2.40*   BILIRUBIN mg/dL  --   --   --   --   --   --   --  2.9*   ALK PHOS U/L  --   --   --   --   --   --   --  137*   AST (SGOT) U/L  --   --   --   --   --   --   --  58*   ALT (SGPT) U/L  --   --   --   --   --   --   --  64*   AMMONIA umol/L  --   --   --    --  20  --   --   --      ABG    Results from last 7 days   Lab Units 03/14/22  1454 03/13/22  0459   PH, ARTERIAL pH units 7.357 7.391   PCO2, ARTERIAL mm Hg 70.1* 60.5*   PO2 ART mm Hg 100.9 51.5*   O2 SATURATION ART % 97.1 84.4*   BASE EXCESS ART mmol/L 11.9* 9.8*     UA      SHANICE  No results found for: POCMETH, POCAMPHET, POCBARBITUR, POCBENZO, POCCOCAINE, POCOPIATES, POCOXYCODO, POCPHENCYC, POCPROPOXY, POCTHC, POCTRICYC  Lysis Labs   Results from last 7 days   Lab Units 03/17/22  0650 03/16/22  0413 03/15/22  0805 03/14/22  1236 03/14/22  0521 03/13/22  2146 03/13/22  1702 03/13/22  0631   INR   --   --   --   --   --   --   --  1.21*   HEMOGLOBIN g/dL  --  9.0* 8.8* 9.9* 9.4*  --   --  11.1*   PLATELETS 10*3/mm3  --  68* 72* 84* 77*  --   --  114*   CREATININE mg/dL 0.48* 0.54* 0.53* 0.59 0.51* 0.61 0.61 0.62     Radiology(recent) XR Chest 1 View    Result Date: 3/16/2022  New small bilateral pleural effusions with bibasilar atelectasis or pneumonia. The findings are greatest in the left lower lobe.  Electronically Signed By-Carlie Jaimes MD On:3/16/2022 12:44 PM This report was finalized on [id number] by  Carlie Jaimes MD.        Results from last 7 days   Lab Units 03/14/22 0521 03/13/22 0631   CK TOTAL U/L 42  --    TROPONIN T ng/mL  --  <0.010       Xrays, labs reviewed personally by physician.    ECG/EMG Results (most recent)     Procedure Component Value Units Date/Time    Adult Transthoracic Echo Complete With Contrast if Necessary Per Protocol [[id number]] Resulted: 03/13/22 0929     Updated: 03/13/22 0931     Target HR (85%) 132 bpm      Max. Pred. HR (100%) 155 bpm      Ao root diam 2.16 cm      Ao pk gabriel 119.8 cm/sec      Ao V2 VTI 22.3 cm      RAS(I,D) 1.79 cm2      EDV(cubed) 24.2 ml      EDV(MOD-sp4) 29.2 ml      EF(MOD-bp) 63.0 %      EF(MOD-sp4) 62.8 %      ESV(cubed) 10.5 ml      ESV(MOD-sp4) 10.9 ml      IVS/LVPW 1.13 cm      LV mass(C)d 58.6 grams      LV V1 max PG 4.8 mmHg      LV  V1 mean PG 1.97 mmHg      LV V1 max 110.1 cm/sec      LVPWd 0.78 cm      MV dec slope 340.5 cm/sec2      MV dec time 0.13 msec      MV V2 VTI 16.7 cm      MVA(VTI) 2.39 cm2      PA acc time 0.11 sec      PA pr(Accel) 29.2 mmHg      PA V2 max 118.0 cm/sec      RAP systole 3.0 mmHg      RV V1 max PG 6.6 mmHg      RV V1 max 128.3 cm/sec      RV V1 VTI 21.2 cm      RVIDd 3.1 cm      RVSP(TR) 18.3 mmHg      SI(MOD-sp4) 11.6 ml/m2      SV(LVOT) 40.0 ml      SV(MOD-sp4) 18.3 ml      SV(RVOT) 82.3 ml      TR max PG 15.3 mmHg      Ao max PG 5.7 mmHg      Ao mean PG 3.1 mmHg      FS 24.3 %      IVSd 0.88 cm      LA dimension(2D) 2.5 cm      LV V1 VTI 18.9 cm      LVIDd 2.9 cm      LVIDs 2.19 cm      LVOT area 2.11 cm2      LVOT diam 1.64 cm      MV E/A 0.70     MV max PG 3.4 mmHg      MV mean PG 1.91 mmHg      RVOT diam 2.22 cm      MV A max gabriel 64.8 cm/sec      MV E max gabriel 45.1 cm/sec      TR max gabriel 195.7 cm/sec      LV [place] Vol (BSA corrected) 18.6 cm2      LV Sys Vol (BSA corrected) 6.9 cm2     Narrative:      · Estimated left ventricular EF was in agreement with the calculated left   ventricular EF. Left ventricular ejection fraction appears to be 61 - 65%.   Left ventricular systolic function is normal.  · The right atrial cavity is moderately dilated.  · The study is technically difficult for diagnosis.  · The quality of the study is limited due to Subcostal windows only for   cardiac imaging..       SCANNED - TELEMETRY   [[id number]] Resulted: 03/13/22     Updated: 03/14/22 0802    SCANNED - TELEMETRY   [[id number]] Resulted: 03/13/22     Updated: 03/14/22 0821    SCANNED - TELEMETRY   [[id number]] Resulted: 03/13/22     Updated: 03/14/22 0837    SCANNED - TELEMETRY   [[id number]] Resulted: 03/13/22     Updated: 03/14/22 1147    SCANNED - TELEMETRY   [[id number]] Resulted: 03/13/22     Updated: 03/14/22 1155    SCANNED - TELEMETRY   [[id number]] Resulted: 03/13/22     Updated: 03/14/22 1214    SCANNED - TELEMETRY    [[id number]] Resulted: 03/13/22     Updated: 03/14/22 1317    SCANNED - TELEMETRY   [[id number]] Resulted: 03/13/22     Updated: 03/14/22 1347    SCANNED - TELEMETRY   [[id number]] Resulted: 03/13/22     Updated: 03/14/22 1517    SCANNED - TELEMETRY   [[id number]] Resulted: 03/13/22     Updated: 03/15/22 0830    SCANNED - TELEMETRY   [[id number]] Resulted: 03/13/22     Updated: 03/15/22 0947    SCANNED - TELEMETRY   [[id number]] Resulted: 03/13/22     Updated: 03/15/22 0958    SCANNED - TELEMETRY   [[id number]] Resulted: 03/13/22     Updated: 03/15/22 1009    SCANNED - TELEMETRY   [[id number]] Resulted: 03/13/22     Updated: 03/15/22 1102    SCANNED - TELEMETRY   [[id number]] Resulted: 03/13/22     Updated: 03/15/22 1457    SCANNED - TELEMETRY   [[id number]] Resulted: 03/13/22     Updated: 03/15/22 1505    SCANNED - TELEMETRY   [[id number]] Resulted: 03/13/22     Updated: 03/16/22 0801    SCANNED - TELEMETRY   [[id number]] Resulted: 03/13/22     Updated: 03/16/22 0822    SCANNED - TELEMETRY   [[id number]] Resulted: 03/13/22     Updated: 03/16/22 0841    SCANNED - TELEMETRY   [[id number]] Resulted: 03/13/22     Updated: 03/16/22 0900    SCANNED - TELEMETRY   [[id number]] Resulted: 03/13/22     Updated: 03/16/22 0900    SCANNED - TELEMETRY   [[id number]] Resulted: 03/13/22     Updated: 03/16/22 1051    SCANNED - TELEMETRY   [[id number]] Resulted: 03/13/22     Updated: 03/16/22 1416            Medication Review:   I have reviewed the patient's current medication list  Scheduled Meds:aluminum sulfate-calcium acetate 1:20, 500 mL, Topical, Q8H  carvedilol, 3.125 mg, Oral, BID With Meals  cefTRIAXone Sodium-Dextrose, 1 g, Intravenous, Q24H  enoxaparin, 40 mg, Subcutaneous, Q24H  hydrocortisone-bacitracin-zinc oxide-nystatin, 1 application, Topical, TID  ipratropium-albuterol, 3 mL, Nebulization, 4x Daily - RT  midodrine, 10 mg, Oral, TID AC  multivitamin, 1 tablet, Oral, Daily  nystatin, , Topical, Q12H  pantoprazole, 40 mg, Oral, Q  "AM  sodium chloride, 10 mL, Intravenous, Q12H  thiamine, 100 mg, Oral, Daily  torsemide, 40 mg, Oral, Daily      Continuous Infusions:   PRN Meds:.•  albuterol  •  magnesium sulfate **OR** magnesium sulfate **OR** magnesium sulfate  •  potassium & sodium phosphates **OR** potassium & sodium phosphates  •  potassium chloride **OR** potassium chloride **OR** potassium chloride  •  sodium chloride    Imaging:  Imaging Results (Last 72 Hours)     Procedure Component Value Units Date/Time    XR Chest 1 View [[id number]] Collected: 03/16/22 1243     Updated: 03/16/22 1246    Narrative:      DATE OF EXAM:  3/16/2022 12:28 PM     PROCEDURE:  XR CHEST 1 VW-     INDICATIONS:  CHF exac       COMPARISON:  AP portable chest 3/13/2022     TECHNIQUE:   Single radiographic view of the chest was obtained.     FINDINGS:  New small bilateral pleural effusions with bibasilar atelectasis or  pneumonia. Chronic scarring in the left upper lobe.. Heart size is  normal. No pneumothorax or acute osseous abnormalities.       Impression:      New small bilateral pleural effusions with bibasilar atelectasis or  pneumonia. The findings are greatest in the left lower lobe.     Electronically Signed By-Carlie Jaimes MD On:3/16/2022 12:44 PM  This report was finalized on [id number] by  Carlie Jaimes MD.            NAVNEET Taylor  03/17/22  13:14 EDT       EMR Dragon/Transcription:   \"Dictated utilizing Dragon dictation\".       Electronically signed by NAVNEET Taylor, 03/16/22, 12:42 PM EDT.  "

## 2022-03-17 NOTE — PLAN OF CARE
Problem: Adult Inpatient Plan of Care  Goal: Plan of Care Review  Outcome: Ongoing, Progressing  Goal: Patient-Specific Goal (Individualized)  Outcome: Ongoing, Progressing  Goal: Absence of Hospital-Acquired Illness or Injury  Outcome: Ongoing, Progressing  Intervention: Identify and Manage Fall Risk  Description: Perform standard risk assessment on admission using a validated tool or comprehensive approach appropriate to the patient; reassess fall risk frequently, with change in status or transfer to another level of care.  Communicate fall injury risk to interprofessional healthcare team.  Determine need for increased observation, equipment and environmental modification, such as low bed, signage and supportive, nonskid footwear.  Adjust safety measures to individual developmental age, stage and identified risk factors.  Reinforce the importance of safety and physical activity with patient and family.  Perform regular intentional rounding to assess need for position change, pain assessment and personal needs, including assistance with toileting.  Recent Flowsheet Documentation  Taken 3/17/2022 1458 by Lisbeth Pang LPN  Safety Promotion/Fall Prevention:   safety round/check completed   nonskid shoes/slippers when out of bed   fall prevention program maintained  Taken 3/17/2022 1345 by Lisbeth Pang LPN  Safety Promotion/Fall Prevention:   safety round/check completed   nonskid shoes/slippers when out of bed  Taken 3/17/2022 1300 by Lisbeth Pang LPN  Safety Promotion/Fall Prevention:   safety round/check completed   nonskid shoes/slippers when out of bed   fall prevention program maintained  Taken 3/17/2022 1122 by Lisbeth Pang LPN  Safety Promotion/Fall Prevention:   safety round/check completed   nonskid shoes/slippers when out of bed   fall prevention program maintained  Taken 3/17/2022 0900 by Lisbeth Pang LPN  Safety Promotion/Fall Prevention:   safety round/check completed   nonskid  shoes/slippers when out of bed   fall prevention program maintained  Taken 3/17/2022 0840 by Lisbeth Pang LPN  Safety Promotion/Fall Prevention:   safety round/check completed   nonskid shoes/slippers when out of bed   fall prevention program maintained  Taken 3/17/2022 0710 by Lisbeth Pang LPN  Safety Promotion/Fall Prevention:   safety round/check completed   nonskid shoes/slippers when out of bed  Intervention: Prevent Skin Injury  Description: Perform a screening for skin injury risk, such as pressure or moisture associated skin damage on admission and at regular intervals throughout hospital stay.  Keep all areas of skin (especially folds) clean and dry.  Maintain adequate skin hydration.  Relieve and redistribute pressure and protect bony prominences; implement measures based on patient-specific risk factors.  Match turning and repositioning schedule to clinical condition.  Encourage weight shift frequently; assist with reposition if unable to complete independently.  Float heels off bed; avoid pressure on the Achilles tendon.  Keep skin free from extended contact with medical devices.  Encourage functional activity and mobility, as early as tolerated.  Use aids (e.g., slide boards, mechanical lift) during transfer.  Recent Flowsheet Documentation  Taken 3/17/2022 1345 by Lisbeth Pang LPN  Body Position: supine, legs elevated  Taken 3/17/2022 0900 by Lisbeth Pang LPN  Body Position:   tilted   left  Taken 3/17/2022 0840 by Lisbeth Pang LPN  Body Position:   tilted   right   lower extremity elevated  Skin Protection: adhesive use limited  Intervention: Prevent and Manage VTE (Venous Thromboembolism) Risk  Description: Assess for VTE (venous thromboembolism) risk.  Encourage and assist with early ambulation.  Initiate and maintain compression or other therapy, as indicated, based on identified risk in accordance with organizational protocol and provider order.  Encourage both active and  passive leg exercises while in bed, if unable to ambulate.  Recent Flowsheet Documentation  Taken 3/17/2022 1300 by Lisbeth Pang LPN  Activity Management: up in chair  Taken 3/17/2022 1122 by Lisbeth Pang LPN  Activity Management: up in chair  Taken 3/17/2022 0840 by Lisbeth Pang LPN  Activity Management:   activity adjusted per tolerance   activity encouraged  Intervention: Prevent Infection  Description: Maintain skin and mucous membrane integrity; promote hand, oral and pulmonary hygiene.  Optimize fluid balance, nutrition, sleep and glycemic control to maximize infection resistance.  Identify potential sources of infection early to prevent or mitigate progression of infection (e.g., wound, lines, devices).  Evaluate ongoing need for invasive devices; remove promptly when no longer indicated.  Recent Flowsheet Documentation  Taken 3/17/2022 0840 by Lisbeth Pang LPN  Infection Prevention: single patient room provided  Goal: Optimal Comfort and Wellbeing  Outcome: Ongoing, Progressing  Intervention: Provide Person-Centered Care  Description: Use a family-focused approach to care.  Develop trust and rapport by proactively providing information, encouraging questions, addressing concerns and offering reassurance.  Acknowledge emotional response to hospitalization.  Recognize and utilize personal coping strategies.  Honor spiritual and cultural preferences.  Recent Flowsheet Documentation  Taken 3/17/2022 0840 by Lisbeth Pang LPN  Trust Relationship/Rapport: care explained  Goal: Readiness for Transition of Care  Outcome: Ongoing, Progressing   Patient observed with eyes open in sitting position in chair. Rise and fall of chest observed. Dressings clean dry and intact. Hourly rounding completed this shift, no complaints or needs voiced.  Goal Outcome Evaluation:

## 2022-03-17 NOTE — THERAPY TREATMENT NOTE
Subjective: Pt agreeable to therapeutic plan of care.    Objective:     Bed mobility - Mod-A supine to sit pt demonstrated good static sitting balance.    Transfers - Max-A and Assist x 2 sit pivot to the bs chair.  No walker available in the room to stand up to a roll walker. Pt then stood about 4 more times after transfer to the chair.  Pt sat on a bed pan to use the bathroom. (pt decined to use the bedside commode)  Ambulation -  N/A    Pain: 3 VAS generalized pain  Education: Provided education on importance of mobility and skilled verbal / tactile cueing throughout intervention.     Assessment: Shanelle Solis presents with functional mobility impairments which indicate the need for skilled intervention. Tolerating session today without incident. Pt was making good effort to participate but is still with significant weakness.  Pt unsafe to return home alone.  Will continue to follow and progress as tolerated.     Plan/Recommendations:   Pt would benefit from Inpatient Rehabilitation placement at discharge from facility  Pt desires Inpatient Rehabilitation placement at discharge. Pt cooperative; agreeable to therapeutic recommendations and plan of care.     Basic Mobility 6-click:  Rollin = Total, A lot = 2, A little = 3; 4 = None  Supine>Sit:   1 = Total, A lot = 2, A little = 3; 4 = None   Sit>Stand with arms:  1 = Total, A lot = 2, A little = 3; 4 = None  Bed>Chair:   1 = Total, A lot = 2, A little = 3; 4 = None  Ambulate in room:  1 = Total, A lot = 2, A little = 3; 4 = None  3-5 Steps with railin = Total, A lot = 2, A little = 3; 4 = None  Score: 10    Modified [place]: 4 = Moderately severe disability (Unable to attend to own bodily needs without assistance, and unable to walk unassisted)     Post-Tx Position: Up in Chair and Call light and personal items within reach  PPE: gloves, surgical mask, eyewear protection

## 2022-03-17 NOTE — PLAN OF CARE
Problem: Adult Inpatient Plan of Care  Goal: Plan of Care Review  Outcome: Ongoing, Progressing  Flowsheets  Taken 3/17/2022 0407 by Linda Patino RN  Outcome Evaluation: Patient slept well through the night. Has had no complaints. CIWA scores have been =0. Falls precautions in place. Q2 turns to prevent further skin breakdown.  Taken 3/16/2022 1606 by Radha Puente, EMELYN  Progress: no change  Taken 3/14/2022 1412 by Junie Camp OT  Plan of Care Reviewed With: patient   Goal Outcome Evaluation:              Outcome Evaluation: Patient slept well through the night. Has had no complaints. CIWA scores have been =0. Falls precautions in place. Q2 turns to prevent further skin breakdown.

## 2022-03-18 ENCOUNTER — READMISSION MANAGEMENT (OUTPATIENT)
Dept: CALL CENTER | Facility: HOSPITAL | Age: 67
End: 2022-03-18

## 2022-03-18 VITALS
TEMPERATURE: 97.3 F | DIASTOLIC BLOOD PRESSURE: 64 MMHG | RESPIRATION RATE: 20 BRPM | BODY MASS INDEX: 20.36 KG/M2 | HEART RATE: 90 BPM | WEIGHT: 122.2 LBS | OXYGEN SATURATION: 100 % | SYSTOLIC BLOOD PRESSURE: 96 MMHG | HEIGHT: 65 IN

## 2022-03-18 PROBLEM — A41.9 SEPSIS: Status: RESOLVED | Noted: 2022-03-18 | Resolved: 2022-03-18

## 2022-03-18 PROBLEM — E87.1 HYPONATREMIA WITH EXCESS EXTRACELLULAR FLUID VOLUME: Status: RESOLVED | Noted: 2022-03-13 | Resolved: 2022-03-18

## 2022-03-18 PROBLEM — R65.10 SIRS (SYSTEMIC INFLAMMATORY RESPONSE SYNDROME): Status: RESOLVED | Noted: 2022-03-13 | Resolved: 2022-03-18

## 2022-03-18 PROBLEM — A41.9 SEPSIS: Status: ACTIVE | Noted: 2022-03-18

## 2022-03-18 PROBLEM — L03.90 CELLULITIS: Status: ACTIVE | Noted: 2022-03-18

## 2022-03-18 PROBLEM — K74.60 CIRRHOSIS: Status: ACTIVE | Noted: 2022-03-18

## 2022-03-18 PROBLEM — F10.10 ALCOHOL ABUSE: Status: ACTIVE | Noted: 2022-03-18

## 2022-03-18 PROBLEM — E87.1 HYPONATREMIA: Status: ACTIVE | Noted: 2022-03-18

## 2022-03-18 PROBLEM — I50.31 ACUTE DIASTOLIC HEART FAILURE: Status: RESOLVED | Noted: 2022-03-18 | Resolved: 2022-03-18

## 2022-03-18 PROBLEM — R74.8 ELEVATED LIVER ENZYMES: Status: RESOLVED | Noted: 2022-03-13 | Resolved: 2022-03-18

## 2022-03-18 PROBLEM — I50.31 ACUTE DIASTOLIC HEART FAILURE: Status: ACTIVE | Noted: 2022-03-18

## 2022-03-18 PROBLEM — E87.1 HYPONATREMIA: Status: RESOLVED | Noted: 2022-03-18 | Resolved: 2022-03-18

## 2022-03-18 LAB
ALBUMIN SERPL-MCNC: 2.5 G/DL (ref 3.5–5.2)
ANION GAP SERPL CALCULATED.3IONS-SCNC: 7 MMOL/L (ref 5–15)
BACTERIA SPEC AEROBE CULT: NORMAL
BACTERIA SPEC AEROBE CULT: NORMAL
BUN SERPL-MCNC: 15 MG/DL (ref 8–23)
BUN/CREAT SERPL: 30 (ref 7–25)
CALCIUM SPEC-SCNC: 7.5 MG/DL (ref 8.6–10.5)
CHLORIDE SERPL-SCNC: 89 MMOL/L (ref 98–107)
CO2 SERPL-SCNC: 41 MMOL/L (ref 22–29)
CREAT SERPL-MCNC: 0.5 MG/DL (ref 0.57–1)
EGFRCR SERPLBLD CKD-EPI 2021: 104.2 ML/MIN/1.73
GLUCOSE SERPL-MCNC: 101 MG/DL (ref 65–99)
MAGNESIUM SERPL-MCNC: 1.3 MG/DL (ref 1.6–2.4)
PHOSPHATE SERPL-MCNC: 2.2 MG/DL (ref 2.5–4.5)
POTASSIUM SERPL-SCNC: 3.8 MMOL/L (ref 3.5–5.2)
SODIUM SERPL-SCNC: 137 MMOL/L (ref 136–145)

## 2022-03-18 PROCEDURE — 94799 UNLISTED PULMONARY SVC/PX: CPT

## 2022-03-18 PROCEDURE — 25010000002 CEFTRIAXONE SODIUM-DEXTROSE 1-3.74 GM-%(50ML) RECONSTITUTED SOLUTION: Performed by: HOSPITALIST

## 2022-03-18 PROCEDURE — 80069 RENAL FUNCTION PANEL: CPT | Performed by: INTERNAL MEDICINE

## 2022-03-18 PROCEDURE — 25010000002 MAGNESIUM SULFATE 2 GM/50ML SOLUTION: Performed by: HOSPITALIST

## 2022-03-18 PROCEDURE — 83735 ASSAY OF MAGNESIUM: CPT | Performed by: HOSPITALIST

## 2022-03-18 PROCEDURE — 99238 HOSP IP/OBS DSCHRG MGMT 30/<: CPT | Performed by: HOSPITALIST

## 2022-03-18 RX ORDER — AMOXICILLIN 250 MG
2 CAPSULE ORAL 2 TIMES DAILY
Status: DISCONTINUED | OUTPATIENT
Start: 2022-03-18 | End: 2022-03-18 | Stop reason: HOSPADM

## 2022-03-18 RX ORDER — MIDODRINE HYDROCHLORIDE 10 MG/1
10 TABLET ORAL
Qty: 90 TABLET | Refills: 0 | Status: SHIPPED | OUTPATIENT
Start: 2022-03-18

## 2022-03-18 RX ORDER — DOXYCYCLINE 100 MG/1
100 CAPSULE ORAL 2 TIMES DAILY
Qty: 10 CAPSULE | Refills: 0 | Status: SHIPPED | OUTPATIENT
Start: 2022-03-18

## 2022-03-18 RX ORDER — POLYETHYLENE GLYCOL 3350 17 G/17G
17 POWDER, FOR SOLUTION ORAL DAILY
Qty: 510 G | Refills: 0 | Status: SHIPPED | OUTPATIENT
Start: 2022-03-19

## 2022-03-18 RX ORDER — CARVEDILOL 3.12 MG/1
3.12 TABLET ORAL 2 TIMES DAILY WITH MEALS
Qty: 60 TABLET | Refills: 0 | Status: SHIPPED | OUTPATIENT
Start: 2022-03-18

## 2022-03-18 RX ORDER — POLYETHYLENE GLYCOL 3350 17 G/17G
17 POWDER, FOR SOLUTION ORAL DAILY
Status: DISCONTINUED | OUTPATIENT
Start: 2022-03-18 | End: 2022-03-18 | Stop reason: HOSPADM

## 2022-03-18 RX ORDER — NYSTATIN 100000 [USP'U]/G
POWDER TOPICAL EVERY 12 HOURS SCHEDULED
Qty: 15 G | Refills: 0 | Status: SHIPPED | OUTPATIENT
Start: 2022-03-18

## 2022-03-18 RX ADMIN — Medication 10 ML: at 07:39

## 2022-03-18 RX ADMIN — MAGNESIUM SULFATE HEPTAHYDRATE 2 G: 2 INJECTION, SOLUTION INTRAVENOUS at 07:36

## 2022-03-18 RX ADMIN — IPRATROPIUM BROMIDE AND ALBUTEROL SULFATE 3 ML: .5; 3 SOLUTION RESPIRATORY (INHALATION) at 11:10

## 2022-03-18 RX ADMIN — MAGNESIUM SULFATE HEPTAHYDRATE 2 G: 2 INJECTION, SOLUTION INTRAVENOUS at 03:24

## 2022-03-18 RX ADMIN — Medication 100 MG: at 07:40

## 2022-03-18 RX ADMIN — IPRATROPIUM BROMIDE AND ALBUTEROL SULFATE 3 ML: .5; 3 SOLUTION RESPIRATORY (INHALATION) at 14:49

## 2022-03-18 RX ADMIN — NYSTATIN 1 APPLICATION: 100000 POWDER TOPICAL at 06:03

## 2022-03-18 RX ADMIN — MAGNESIUM SULFATE HEPTAHYDRATE 2 G: 2 INJECTION, SOLUTION INTRAVENOUS at 10:22

## 2022-03-18 RX ADMIN — MIDODRINE HYDROCHLORIDE 10 MG: 5 TABLET ORAL at 07:41

## 2022-03-18 RX ADMIN — CEFTRIAXONE 1 G: 1 INJECTION, SOLUTION INTRAVENOUS at 05:55

## 2022-03-18 RX ADMIN — MIDODRINE HYDROCHLORIDE 10 MG: 5 TABLET ORAL at 11:04

## 2022-03-18 RX ADMIN — TORSEMIDE 40 MG: 10 TABLET ORAL at 07:43

## 2022-03-18 RX ADMIN — THERA TABS 1 TABLET: TAB at 07:40

## 2022-03-18 RX ADMIN — HYDROCORTISONE 1 APPLICATION: 1 OINTMENT TOPICAL at 07:42

## 2022-03-18 RX ADMIN — POLYETHYLENE GLYCOL 3350 17 G: 17 POWDER, FOR SOLUTION ORAL at 11:04

## 2022-03-18 RX ADMIN — PANTOPRAZOLE SODIUM 40 MG: 40 TABLET, DELAYED RELEASE ORAL at 05:55

## 2022-03-18 RX ADMIN — IPRATROPIUM BROMIDE AND ALBUTEROL SULFATE 3 ML: .5; 3 SOLUTION RESPIRATORY (INHALATION) at 06:55

## 2022-03-18 RX ADMIN — SENNOSIDES AND DOCUSATE SODIUM 2 TABLET: 50; 8.6 TABLET ORAL at 11:04

## 2022-03-18 NOTE — DISCHARGE SUMMARY
[facility] Medicine Services  DISCHARGE SUMMARY    Patient Name: Shanelle Solis  : 1955  MRN: [id number]    Date of Admission: 3/13/2022  Date of Discharge:  3/18/2022  Primary Care Physician: Provider, No Known      Presenting Problem:   CHF exacerbation (HCC) [I50.9]    Active and Resolved Hospital Problems:  Active Hospital Problems    Diagnosis POA   • Cellulitis [L03.90] Yes     Priority: High   • Cirrhosis (HCC) [K74.60] Yes     Priority: High   • Alcohol abuse [F10.10] Yes     Priority: High   • Severe malnutrition (CMS/HCC) [E43] Yes     Priority: High   • Dermatitis associated with moisture [L30.8] Yes     Priority: Medium   • Unstageable pressure ulcer of sacral region (HCC) [L89.150] Yes     Priority: Medium   • Thrombocytopenia (HCC) [D69.6] Yes     Priority: Medium   • Lactic acidosis [E87.2] Yes     Priority: Medium   • Skin excoriation [T14.8XXA] Yes     Priority: Medium      Resolved Hospital Problems    Diagnosis POA   • Sepsis (HCC) [A41.9] Yes     Priority: High   • Hyponatremia [E87.1] Unknown     Priority: High   • Hyponatremia with excess extracellular fluid volume [E87.1] Yes     Priority: High   • SIRS (systemic inflammatory response syndrome) (HCC) [R65.10] Yes     Priority: High   • Elevated liver enzymes [R74.8] Yes     Priority: Medium         Hospital Course     Hospital Course:    The patient is a 67-year-old female with history of alcohol abuse, cirrhosis, COPD, GERD, breast cancer s/p radiation, cervical cancer and tobacco abuse.    The patient came to the emergency room on 3/13/2022 after her care Graeber found tachycardia and low blood pressure.  The patient apparently at baseline is mostly bedbound and requires assistance with her daily activities of living.  She was recently started on Keflex by her PCP for treatment of lower extremity cellulitis    In the ED, the patient was diagnosed with sepsis due to cellulitis and acute diastolic heart  failure.  She was also diagnosed with hyponatremia.  Cardiology and nephrology were consulted.  Hyponatremia improved with diuretics.  [place] protocol was continued due to history of alcohol abuse.  The patient declined rehab placement and was discharged home on 3/18/2022.    Problem list addressed during the hospitalization:        Acute diastolic heart failure: Resolved.  -s/p TTE 3/13/22--> LVEF 61-65%.  -Cardiology consulted     Multifactorial hyponatremia: Improved.  -Nephrology consulted     Hyperkalemia: Resolved  -s/p calcium gluconate, Lokelma     Sepsis due to lower extremity cellulitis: Improved.  -Treated with Rocephin  -Wound care consulted  -Discharged on doxycycline     Alcoholic cirrhosis complicated with elevated LFTs, jaundice and thrombocytopenia:  -No evidence of hepatic encephalopathy  -No evidence of bleeding     Alcohol abuse:  -Monitored on CIWA protocol during the hospitalization  -Provided with multivitamins and thiamine     severe protein calorie malnutrition:  -Encouraged oral intake  -PT/OT consulted but the patient refused inpatient rehab  -Mostly bedbound and has a caretaker at home     COPD/asthma:  -Not in exacerbation     Chronic hypoxemic respiratory failure:  -On 3L at home     GERD:  -Continue PPI     Tobacco dependence:  -Strongly encouraged tobacco cessation           DISCHARGE Follow Up Recommendations for labs and diagnostics:       Reasons For Change In Medications and Indications for New Medications:      Day of Discharge     Vital Signs:  Temp:  [97.3 °F (36.3 °C)-97.8 °F (36.6 °C)] 97.3 °F (36.3 °C)  Heart Rate:  [] 90  Resp:  [15-20] 20  BP: ()/(53-67) 96/64  Flow (L/min):  [3] 3    Physical Exam:  Physical Exam  HENT:      Head: Normocephalic.      Nose: Nose normal.   Eyes:      Pupils: Pupils are equal, round, and reactive to light.   Cardiovascular:      Rate and Rhythm: Normal rate.   Pulmonary:      Effort: Pulmonary effort is normal.   Abdominal:       General: Bowel sounds are normal.   Musculoskeletal:      Cervical back: Normal range of motion.      Comments: Decreased range of motion hips   Skin:     General: Skin is warm.   Neurological:      Mental Status: She is alert. Mental status is at baseline.   Psychiatric:         Mood and Affect: Mood normal.            Pertinent  and/or Most Recent Results     LAB RESULTS:      Lab 03/16/22  0413 03/15/22  0805 03/14/22  1236 03/14/22  0521 03/13/22  1013 03/13/22  0631   WBC 3.20* 3.00* 5.80 5.30  --  10.20   HEMOGLOBIN 9.0* 8.8* 9.9* 9.4*  --  11.1*   HEMATOCRIT 28.0* 27.2* 29.3* 27.5*  --  33.1*   PLATELETS 68* 72* 84* 77*  --  114*   NEUTROS ABS 2.30 2.00 4.70 4.20  --  9.20*   LYMPHS ABS 0.50* 0.60* 0.60* 0.60*  --  0.60*   MONOS ABS 0.30 0.20 0.30 0.30  --  0.40   EOS ABS 0.20 0.20 0.20 0.20  --  0.00   .1* 100.4* 99.7* 99.3*  --  96.8   LACTATE  --   --   --   --  1.9 2.1*   PROTIME  --   --   --   --   --  13.2*         Lab 03/18/22  0127 03/17/22  0650 03/17/22  0020 03/16/22  0413 03/15/22  0805 03/14/22  1236 03/14/22  0521 03/13/22  1702 03/13/22  0631   SODIUM 137 130*  --  128* 122* 122* 122*   < > 116*   POTASSIUM 3.8 4.1  --  4.9 4.8 4.7 4.7   < > 6.1*   CHLORIDE 89* 92*  --  93* 88* 84* 83*   < > 77*   CO2 41.0* 34.0*  --  33.0* 30.0* 36.0* 39.0*   < > 35.0*   ANION GAP 7.0 4.0*  --  2.0* 4.0* 2.0* 0.0*   < > 4.0*   BUN 15 9  --  10 11 11 11   < > 10   CREATININE 0.50* 0.48*  --  0.54* 0.53* 0.59 0.51*   < > 0.62   EGFR 104.2 105.3  --  102.3 102.8 100.2 103.7   < > 99.0   GLUCOSE 101* 111*  --  96 102* 127* 101*   < > 115*   CALCIUM 7.5* 7.7*  --  7.4* 7.8* 8.5* 8.3*   < > 8.6   MAGNESIUM 1.3*  --  1.6 1.9 1.4* 1.6  --   --  1.6   PHOSPHORUS 2.2* 1.9*  --  2.5 1.8*  --  2.7   < > 3.7   TSH  --   --   --   --   --   --   --   --  3.200    < > = values in this interval not displayed.         Lab 03/18/22  0127 03/17/22  0650 03/16/22  0413 03/15/22  0805 03/14/22  0521 03/13/22  1702  03/13/22 0631   TOTAL PROTEIN  --   --   --   --   --   --  5.5*   ALBUMIN 2.50* 2.40* 1.80* 1.80* 2.00*   < > 2.40*   GLOBULIN  --   --   --   --   --   --  3.1   ALT (SGPT)  --   --   --   --   --   --  64*   AST (SGOT)  --   --   --   --   --   --  58*   BILIRUBIN  --   --   --   --   --   --  2.9*   ALK PHOS  --   --   --   --   --   --  137*    < > = values in this interval not displayed.         Lab 03/13/22 0631   PROBNP 7,785.0*   TROPONIN T <0.010   PROTIME 13.2*   INR 1.21*         Lab 03/13/22 0631   CHOLESTEROL 68   LDL CHOL <5   HDL CHOL 50   TRIGLYCERIDES 63             Lab 03/14/22  1454 03/13/22  0459   PH, ARTERIAL 7.357 7.391   PCO2, ARTERIAL 70.1* 60.5*   PO2 .9 51.5*   O2 SATURATION ART 97.1 84.4*   FIO2 <21 21   HCO3 ART 39.3* 36.8*   BASE EXCESS ART 11.9* 9.8*     Brief Urine Lab Results     None        Microbiology Results (last 10 days)     Procedure Component Value - Date/Time    Blood Culture - Blood, Arm, Left [[id number]]  (Normal) Collected: 03/13/22 0635    Lab Status: Final result Specimen: Blood from Arm, Left Updated: 03/18/22 0647     Blood Culture No growth at 5 days    Blood Culture - Blood, Arm, Right [[id number]]  (Normal) Collected: 03/13/22 0631    Lab Status: Final result Specimen: Blood from Arm, Right Updated: 03/18/22 0647     Blood Culture No growth at 5 days          XR Chest 1 View    Result Date: 3/16/2022  Impression: New small bilateral pleural effusions with bibasilar atelectasis or pneumonia. The findings are greatest in the left lower lobe.  Electronically Signed By-Carlie Jaimes MD On:3/16/2022 12:44 PM This report was finalized on [id number] by  Carlie Jaimes MD.    XR Chest 1 View    Result Date: 3/13/2022  Impression: IMPRESSION : Hyperexpansion of the lungs compatible with severe COPD, emphysema.  Patchy opacity at the left apex may represent scarring although pneumonia cannot be excluded.  Electronically Signed By-Jeanmarie López On:3/13/2022  10:02 AM This report was finalized on [id number] by  Jeanmarie López, .              Results for orders placed during the hospital encounter of 03/13/22    Adult Transthoracic Echo Complete With Contrast if Necessary Per Protocol    Interpretation Summary  · Estimated left ventricular EF was in agreement with the calculated left ventricular EF. Left ventricular ejection fraction appears to be 61 - 65%. Left ventricular systolic function is normal.  · The right atrial cavity is moderately dilated.  · The study is technically difficult for diagnosis.  · The quality of the study is limited due to Subcostal windows only for cardiac imaging..      Labs Pending at Discharge:      Procedures Performed           Consults:   Consults     Date and Time Order Name Status Description    3/13/2022  4:22 AM Inpatient Nephrology Consult Completed     3/13/2022  4:22 AM Inpatient Cardiology Consult Completed             Discharge Details        Discharge Medications      New Medications      Instructions Start Date   carvedilol 3.125 MG tablet  Commonly known as: COREG   3.125 mg, Oral, 2 Times Daily With Meals      doxycycline 100 MG capsule  Commonly known as: MONODOX   100 mg, Oral, 2 Times Daily      hydrocortisone-bacitracin-zinc oxide-nystatin  Commonly known as: MAGIC BARRIER   1 application, Topical, 3 Times Daily      midodrine 10 MG tablet  Commonly known as: PROAMATINE   10 mg, Oral, 3 Times Daily Before Meals      nystatin [id number] UNIT/GM powder  Generic drug: nystatin   Topical, Every 12 Hours Scheduled      polyethylene glycol 17 GM/SCOOP powder  Commonly known as: MIRALAX   mix 1 capful (17 g) in liquid by mouth Daily.   Start Date: March 19, 2022     Thiamine Mononitrate 100 MG tablet   100 mg, Oral, Daily         Continue These Medications      Instructions Start Date   cholecalciferol 25 MCG (1000 UT) tablet  Commonly known as: VITAMIN D3   1,000 Units, Oral, Daily      furosemide 40 MG tablet  Commonly known  as: LASIX   40 mg, Oral, Daily      guaiFENesin 600 MG 12 hr tablet  Commonly known as: MUCINEX   600 mg, Oral, 2 Times Daily      magnesium oxide 400 (241.3 Mg) MG tablet tablet  Commonly known as: MAGOX   400 mg, Oral, 2 Times Daily      potassium chloride 10 MEQ CR tablet  Commonly known as: K-DUR,KLOR-CON   Oral      traZODone 50 MG tablet  Commonly known as: DESYREL   25 mg, Oral, Nightly         Stop These Medications    cephalexin 500 MG capsule  Commonly known as: KEFLEX     metoprolol tartrate 25 MG tablet  Commonly known as: LOPRESSOR            No Known Allergies      Discharge Disposition:   Home or Self Care    Diet:  Hospital:  Diet Order   Procedures   • Diet Cardiac; 2gm Na+; Fluid Restriction; 1200cc/day         Discharge Activity:   Activity Instructions    Activity as tolerated           Discharge Condition: Stable      CODE STATUS:  Code Status and Medical Interventions:   Ordered at: 03/13/22 0422     Medical Intervention Limits:    NO intubation (DNI)    NO cardioversion    NO antiarrhythmic drugs     Level Of Support Discussed With:    Patient     Code Status (Patient has no pulse and is not breathing):    No CPR (Do Not Attempt to Resuscitate)     Medical Interventions (Patient has pulse or is breathing):    Limited Support         No future appointments.    Additional Instructions for the Follow-ups that You Need to Schedule     Discharge Follow-up with PCP   As directed       Currently Documented PCP:    Provider, No Known    PCP Phone Number:    None     Follow Up Details: 2 weeks               Time spent on Discharge including face to face service: 15 minutes    This patient has been examined wearing appropriate Personal Protective Equipment and discussed with nursing. 03/18/22      Signature: Electronically signed by Eloy Duke DO, 03/18/22, 5:38 PM EDT.

## 2022-03-18 NOTE — PROGRESS NOTES
[facility] Medicine Services Daily Progress Note    Patient Name: Shanelle Solis  : 1955  MRN: [id number]  Primary Care Physician:  Provider, No Known  Date of admission: 3/13/2022      Subjective      Chief Complaint: None      Patient Reports:    3/13/22: Patient poor historian.  Given Ativan for CIWA.  Nephrology and cardiology consulted.    3/14/22: Low BP.  On midodrine.  Afebrile.  Wound care consulted.  ABG ordered in the afternoon to evaluate confusion.  Likely chronic CO2 retention.    3/15/22: No complaints.  Able to answer questions.  Will replace electrolytes.    3/16/22:  no complaints.  Awaiting placement.  3/17/22: Encouraged oral intake.  3/18/22: No complaints.    Review of Systems   All other systems reviewed and are negative.         Objective      Vitals:   Temp:  [92.5 °F (33.6 °C)-97.8 °F (36.6 °C)] 97.8 °F (36.6 °C)  Heart Rate:  [] 85  Resp:  [15-20] 20  BP: ()/(53-67) 88/54  Flow (L/min):  [3] 3    Physical Exam  HENT:      Head: Normocephalic.      Mouth/Throat:      Mouth: Mucous membranes are moist.   Eyes:      General: No scleral icterus.     Extraocular Movements: Extraocular movements intact.      Pupils: Pupils are equal, round, and reactive to light.   Cardiovascular:      Rate and Rhythm: Normal rate.   Pulmonary:      Effort: Pulmonary effort is normal.   Abdominal:      General: Bowel sounds are normal.   Musculoskeletal:      Cervical back: Normal range of motion.      Comments: Erythema of thighs, posterior thigh and lower abdomen   Neurological:      Mental Status: She is alert.             Result Review    Result Review:  I have personally reviewed the results from the time of this admission to 3/18/2022 10:17 EDT and agree with these findings:  [x]  Laboratory  []  Microbiology  [x]  Radiology  []  EKG/Telemetry   []  Cardiology/Vascular   []  Pathology  [x]  Old records  []  Other:      Wounds (last 24 hours)     LDA Wound     Row  Name 03/17/22 1905             Wound 03/13/22 0506 Left gluteal Pressure Injury    Wound - Properties Group Placement Date: 03/13/22  -ER Placement Time: 0506  -ER Side: Left  -ER Location: gluteal  -ER Primary Wound Type: Pressure inj  -ER      Pressure Injury Stage 3  -BB      Dressing Appearance open to air  -BB      Closure Open to air  -BB      Base red;non-blanchable;slough  -BB      Periwound excoriated  -BB      Retired Wound - Properties Group Placement Date: 03/13/22  -ER Placement Time: 0506  -ER Side: Left  -ER Location: gluteal  -ER Primary Wound Type: Pressure inj  -ER      Retired Wound - Properties Group Date first assessed: 03/13/22  -ER Time first assessed: 0506  -ER Side: Left  -ER Location: gluteal  -ER Primary Wound Type: Pressure inj  -ER              Wound 03/13/22 0507 Right gluteal Pressure Injury    Wound - Properties Group Placement Date: 03/13/22  -ER Placement Time: 0507  -ER Side: Right  -ER Location: gluteal  -ER Primary Wound Type: Pressure inj  -ER      Pressure Injury Stage 3  -BB      Dressing Appearance open to air  -BB      Closure Open to air  -BB      Base red;non-blanchable;slough  -BB      Periwound excoriated  -BB      Retired Wound - Properties Group Placement Date: 03/13/22  -ER Placement Time: 0507  -ER Side: Right  -ER Location: gluteal  -ER Primary Wound Type: Pressure inj  -ER      Retired Wound - Properties Group Date first assessed: 03/13/22  -ER Time first assessed: 0507  -ER Side: Right  -ER Location: gluteal  -ER Primary Wound Type: Pressure inj  -ER              Wound 03/13/22 0524 Right proximal arm    Wound - Properties Group Placement Date: 03/13/22  -ER Placement Time: 0524  -ER Side: Right  -ER Orientation: proximal  -ER Location: arm  -ER      Dressing Appearance open to air  -BB      Closure --  -BB      Base red  -BB      Retired Wound - Properties Group Placement Date: 03/13/22  -ER Placement Time: 0524  -ER Side: Right  -ER Orientation: proximal  -ER  Location: arm  -ER      Retired Wound - Properties Group Date first assessed: 03/13/22  -ER Time first assessed: 0524  -ER Side: Right  -ER Location: arm  -ER              Wound 03/13/22 0528 Left lower leg Abrasion    Wound - Properties Group Placement Date: 03/13/22  -ER Placement Time: 0528  -ER Side: Left  -ER Orientation: lower  -ER Location: leg  -ER Primary Wound Type: Abrasion  -ER      Dressing Appearance open to air  -BB      Closure --  -BB      Base scab  -BB      Retired Wound - Properties Group Placement Date: 03/13/22  -ER Placement Time: 0528  -ER Side: Left  -ER Orientation: lower  -ER Location: leg  -ER Primary Wound Type: Abrasion  -ER      Retired Wound - Properties Group Date first assessed: 03/13/22  -ER Time first assessed: 0528  -ER Side: Left  -ER Location: leg  -ER Primary Wound Type: Abrasion  -ER              Rash 03/13/22 0510 groin    Rash - Properties Group Placement Date: 03/13/22  -ER Placement Time: 0510  -ER Location: groin  -ER      Color red  -BB      Characteristics dry;peeling  -BB      Retired Rash - Properties Group Placement Date: 03/13/22  -ER Placement Time: 0510  -ER Location: groin  -ER      Retired Rash - Properties Group Date first assessed: 03/13/22  -ER Time first assessed: 0510  -ER Location: groin  -ER            User Key  (r) = Recorded By, (t) = Taken By, (c) = Cosigned By    Initials Name Provider Type    Linda Jean RN Registered Nurse    Abbi Hooks RN Registered Nurse                  Assessment/Plan      Brief Patient Summary:    67-year-old female with history of alcoholic cirrhosis, breast/cervical cancer and chronic hypoxemic respiratory failure on 3L oxygen presented to [facility] ED for evaluation of weakness.  Patient was found to have low blood pressure, tachycardia, confusion and was recently started by PCP on Keflex for cellulitis.  Patient found with extensive skin excoriation of thighs, lower abdomen and coccygeal region.   Patient admitted for acute CHF, sepsis due to lower extremity cellulitis and hyponatremia.      carvedilol, 3.125 mg, Oral, BID With Meals  cefTRIAXone Sodium-Dextrose, 1 g, Intravenous, Q24H  enoxaparin, 40 mg, Subcutaneous, Q24H  hydrocortisone-bacitracin-zinc oxide-nystatin, 1 application, Topical, TID  ipratropium-albuterol, 3 mL, Nebulization, 4x Daily - RT  midodrine, 10 mg, Oral, TID AC  multivitamin, 1 tablet, Oral, Daily  nystatin, , Topical, Q12H  pantoprazole, 40 mg, Oral, Q AM  sodium chloride, 10 mL, Intravenous, Q12H  thiamine, 100 mg, Oral, Daily  torsemide, 40 mg, Oral, Daily             Active Hospital Problems:  Active Hospital Problems    Diagnosis    • **CHF exacerbation (HCC)    • Dermatitis associated with moisture    • Unstageable pressure ulcer of sacral region (HCC)    • Severe malnutrition (CMS/HCC)    • Hyponatremia with excess extracellular fluid volume    • Elevated liver enzymes    • Thrombocytopenia (HCC)    • Lactic acidosis    • SIRS (systemic inflammatory response syndrome) (HCC)    • Skin excoriation      Acute diastolic heart failure:  -s/p TTE 3/13/22--> LVEF 61-65%.  -Cardiology consult    Multifactorial hyponatremia: Slowly improving  -Nephrology consulted    Hyperkalemia: Resolved  -s/p calcium gluconate, Lokelma    Sepsis due to lower extremity cellulitis:  -Continue Rocephin  -Wound care following    Alcoholic cirrhosis complicated with elevated LFTs, jaundice and thrombocytopenia:  -Check ammonia in the a.m.  -No evidence of bleeding  -Unknown if she follows with GI  -Ammonia within normal limits    Alcohol abuse:  -On CIWA protocol  -Continue multivitamins and thiamine    severe protein calorie malnutrition:  -Encouraged oral intake  -PT/OT following  -Mostly bedbound and has a caretaker at home    COPD/asthma:  -Not in exacerbation    Chronic hypoxemic respiratory failure:  -On 3L at home    GERD:  -Continue PPI    Tobacco dependence:  -We will offer nicotine  patch      DVT prophylaxis:  Medical DVT prophylaxis orders are present.    CODE STATUS:    Medical Intervention Limits: NO intubation (DNI); NO cardioversion; NO antiarrhythmic drugs  Level Of Support Discussed With: Patient  Code Status (Patient has no pulse and is not breathing): No CPR (Do Not Attempt to Resuscitate)  Medical Interventions (Patient has pulse or is breathing): Limited Support      Disposition:  I expect patient to be discharged defer.    This patient has been examined wearing appropriate Personal Protective Equipment and discussed with nursing. 03/18/22      Electronically signed by Eloy Duke DO, 03/18/22, 10:17 EDT.  [facility] Hospitalist Team

## 2022-03-18 NOTE — CASE MANAGEMENT/SOCIAL WORK
Continued Stay Note   [facility]     Patient Name: Shanelle Solis  MRN: [id number]  Today's Date: 3/18/2022    Admit Date: 3/13/2022     Discharge Plan     Row Name 03/18/22 1203       Plan    Plan DC Plan: REturn home with family and [facility] home services.    Plan Comments CM received a call from the patient's daughter . She reported that the patient does not want rehab and that the she does not want her to go to rehab either. She reports that the patient does not walk at home. She reported that the patient does stand and pivot transfers . She reported that the patient does have arm braces that she can use to walk but the patient does not use them. CM spoke with the patient at bedside and the patient reports that she absolutely does not want rehab. She does not want to go to any of those facilities because they have Covid there. CARLINE updated the MD  via secure chat that the patient does not want rehab.              Expected Discharge Date and Time     Expected Discharge Date Expected Discharge Time    Mar 18, 2022

## 2022-03-18 NOTE — PROGRESS NOTES
"[facility] PROGRESS NOTE     LOS: 5 days    Patient Care Team:  Provider, No Known as PCP - General      Subjective     Patient feeling tired but overall feeling better.  Dyspnea improving slowly    Objective     Vital Sign Min/Max for last 24 hours  Temp:  [96.1 °F (35.6 °C)-97.8 °F (36.6 °C)] 97.3 °F (36.3 °C)  Heart Rate:  [] 107  Resp:  [15-20] 20  BP: ()/(53-67) 96/64                       Flowsheet Rows    Flowsheet Row First Filed Value   Admission Height 165.1 cm (65\") Documented at 03/13/2022 0142   Admission Weight 53.2 kg (117 lb 4.6 oz) Documented at 03/13/2022 0142          I/O this shift:  In: 580 [P.O.:480; I.V.:100]  Out: 120 [Urine:120]  I/O last 3 completed shifts:  In: 150 [I.V.:50; IV Piggyback:100]  Out: -     Physical Exam:  Physical Exam    General Appearance: Chronically ill-appearing  Skin: warm and dry  HEENT: oral mucosa dry, nonicteric sclera  Neck: supple, no JVD  Lungs: Bilateral rhonchi   heart: RRR, normal S1 and S2  Abdomen: soft, nontender, nondistended  : no palpable bladder  Extremities: no edema, cyanosis or clubbing         LABS:  Lab Results   Component Value Date    CALCIUM 7.5 (L) 03/18/2022    PHOS 2.2 (L) 03/18/2022     Results from last 7 days   Lab Units 03/18/22  0127 03/17/22  0650 03/17/22  0020 03/16/22  0413 03/15/22  0805 03/14/22  1236 03/13/22  1702 03/13/22  0631   MAGNESIUM mg/dL 1.3*  --  1.6 1.9 1.4* 1.6  --  1.6   SODIUM mmol/L 137 130*  --  128* 122* 122*   < > 116*   POTASSIUM mmol/L 3.8 4.1  --  4.9 4.8 4.7   < > 6.1*   CHLORIDE mmol/L 89* 92*  --  93* 88* 84*   < > 77*   CO2 mmol/L 41.0* 34.0*  --  33.0* 30.0* 36.0*   < > 35.0*   BUN mg/dL 15 9  --  10 11 11   < > 10   CREATININE mg/dL 0.50* 0.48*  --  0.54* 0.53* 0.59   < > 0.62   GLUCOSE mg/dL 101* 111*  --  96 102* 127*   < > 115*   CALCIUM mg/dL 7.5* 7.7*  --  7.4* 7.8* 8.5*   < > 8.6   WBC 10*3/mm3  --   --   --  3.20* 3.00* 5.80   < > 10.20   HEMOGLOBIN g/dL  --   --   --  9.0* " 8.8* 9.9*   < > 11.1*   PLATELETS 10*3/mm3  --   --   --  68* 72* 84*   < > 114*   ALT (SGPT) U/L  --   --   --   --   --   --   --  64*   AST (SGOT) U/L  --   --   --   --   --   --   --  58*    < > = values in this interval not displayed.     Lab Results   Component Value Date    CKTOTAL 42 03/14/2022    TROPONINT <0.010 03/13/2022     Estimated Creatinine Clearance: 98.1 mL/min (A) (by C-G formula based on SCr of 0.5 mg/dL (L)).  Results from last 7 days   Lab Units 03/14/22  1454   PH, ARTERIAL pH units 7.357   PO2 ART mm Hg 100.9   PCO2, ARTERIAL mm Hg 70.1*   HCO3 ART mmol/L 39.3*     Brief Urine Lab Results     None        WEIGHTS:     Wt Readings from Last 1 Encounters:   03/18/22 0335 55.4 kg (122 lb 3.2 oz)   03/17/22 0431 57.1 kg (125 lb 12.8 oz)   03/16/22 0509 59.9 kg (132 lb 0.9 oz)   03/13/22 0722 53.1 kg (117 lb)   03/13/22 0142 53.2 kg (117 lb 4.6 oz)       carvedilol, 3.125 mg, Oral, BID With Meals  cefTRIAXone Sodium-Dextrose, 1 g, Intravenous, Q24H  enoxaparin, 40 mg, Subcutaneous, Q24H  hydrocortisone-bacitracin-zinc oxide-nystatin, 1 application, Topical, TID  ipratropium-albuterol, 3 mL, Nebulization, 4x Daily - RT  midodrine, 10 mg, Oral, TID AC  multivitamin, 1 tablet, Oral, Daily  nystatin, , Topical, Q12H  pantoprazole, 40 mg, Oral, Q AM  polyethylene glycol, 17 g, Oral, Daily  senna-docusate sodium, 2 tablet, Oral, BID  sodium chloride, 10 mL, Intravenous, Q12H  thiamine, 100 mg, Oral, Daily  torsemide, 40 mg, Oral, Daily           Assessment/Plan       1.  Hyponatremia  Improved with diuresis.  Seems to be secondary to CHF and cirrhosis  2.  Hypotension.  Improved  3.  CHF.  Diastolic.  Chronic  4.  Alcoholic cirrhosis  5.  Hypomagnesemia.   6.  Hypophosphatemia.       Recs:  Continue oral torsemide and midodrine  I will follow peripherally.  Please call for any question  Monitor electrolytes and replace as needed    Salazar Santos MD  03/18/22  14:08 EDT

## 2022-03-19 NOTE — OUTREACH NOTE
Prep Survey    Flowsheet Row Responses   [facility] facility patient discharged from? [facility]   Is LACE score < 7 ? No   Emergency Room discharge w/ pulse ox? No   Eligibility Readm Mgmt   Discharge diagnosis Sepsis due to cellulitis,  Acute diastolic heart failure   Does the patient have one of the following disease processes/diagnoses(primary or secondary)? Sepsis   Does the patient have Home health ordered? Yes   What is the Home health agency?  [facility].   Is there a DME ordered? No   Comments regarding appointments See AVS   Prep survey completed? Yes          SHIRLEY INTERIANO - Registered Nurse

## 2022-03-21 NOTE — CASE MANAGEMENT/SOCIAL WORK
Case Management Discharge Note           Provided Post Acute Provider List?: Yes  Post Acute Provider List: Inpatient Rehab  Provided Post Acute Provider Quality & Resource List?: Yes  Post Acute Provider Quality and Resource List: Inpatient Rehab  Delivered To: Patient  Method of Delivery: In person    Selected Continued Care - Discharged on 3/18/2022 Admission date: 3/13/2022 - Discharge disposition: Home or Self Care     Final Discharge Disposition Code: 01 - home or self-care

## 2022-03-23 ENCOUNTER — READMISSION MANAGEMENT (OUTPATIENT)
Dept: CALL CENTER | Facility: HOSPITAL | Age: 67
End: 2022-03-23

## 2022-03-23 NOTE — OUTREACH NOTE
Sepsis Week 1 Survey    Flowsheet Row Responses   [facility] patient discharged from? [facility]   Does the patient have one of the following disease processes/diagnoses(primary or secondary)? Sepsis   Week 1 attempt successful? No   Unsuccessful attempts Attempt 1          SHERREI GUERRERO - Registered Nurse

## 2022-03-28 ENCOUNTER — READMISSION MANAGEMENT (OUTPATIENT)
Dept: CALL CENTER | Facility: HOSPITAL | Age: 67
End: 2022-03-28

## 2022-03-28 NOTE — OUTREACH NOTE
Sepsis Week 1 Survey    Flowsheet Row Responses   [facility] facility patient discharged from? [facility]   Does the patient have one of the following disease processes/diagnoses(primary or secondary)? Sepsis   Week 1 attempt successful? No   Unsuccessful attempts Attempt 2  [all numbers attempted- no answer]          CAMERON JAMES - Registered Nurse

## 2022-03-31 ENCOUNTER — READMISSION MANAGEMENT (OUTPATIENT)
Dept: CALL CENTER | Facility: HOSPITAL | Age: 67
End: 2022-03-31

## 2022-03-31 NOTE — OUTREACH NOTE
"Sepsis Week 1 Survey    Flowsheet Row Responses   [facility] patient discharged from? [facility]   Does the patient have one of the following disease processes/diagnoses(primary or secondary)? Sepsis   Week 1 attempt successful? Yes   Call start time 1445   Call end time 1446   Discharge diagnosis Sepsis due to cellulitis,  Acute diastolic heart failure   Meds reviewed with patient/caregiver? Yes   Does the patient have all medications related to this admission filled (includes all antibiotics, inhalers, nebulizers,steroids,etc.) No   Medication comments PATIENT STATES, \"NO, I DON'T HAVE MY MEDICINES BECAUSE YOU GUYS NEVER SENT ME MY MEDICINES.\" PATIENT THEN ENDED CALL.    Week 1 call completed? Yes   Wrap up additional comments PATIENT ENDED CALL VERY QUICKLY. SHE STATED SHE DID NOT HAVE HER MEDICATIONS (SEE MEDICATION SECTION OF THIS SURVEY), AND THEN STATES, \"I'M ON THE OTHER LINE AND THE NURSE IS HERE,\" AND THEN PROCEEDED TO END THIS CALL.\"           SUSANNAH VUONG - Licensed Nurse  "

## 2022-04-08 ENCOUNTER — READMISSION MANAGEMENT (OUTPATIENT)
Dept: CALL CENTER | Facility: HOSPITAL | Age: 67
End: 2022-04-08

## 2022-04-08 NOTE — OUTREACH NOTE
Sepsis Week 2 Survey    Flowsheet Row Responses   [facility] facility patient discharged from? [facility]   Does the patient have one of the following disease processes/diagnoses(primary or secondary)? Sepsis   Week 2 attempt successful? No   Unsuccessful attempts Attempt 1   Discharge diagnosis Sepsis due to cellulitis,  Acute diastolic heart failure          STERLING GALINDO - Registered Nurse

## 2022-04-13 ENCOUNTER — READMISSION MANAGEMENT (OUTPATIENT)
Dept: CALL CENTER | Facility: HOSPITAL | Age: 67
End: 2022-04-13

## 2022-04-13 NOTE — OUTREACH NOTE
Sepsis Week 2 Survey    Flowsheet Row Responses   [facility] facility patient discharged from? [facility]   Does the patient have one of the following disease processes/diagnoses(primary or secondary)? Sepsis   Week 2 attempt successful? No   Unsuccessful attempts Attempt 2   Revoke Decline to participate          JORY KRUSE - Licensed Nurse